# Patient Record
Sex: MALE | Race: WHITE | NOT HISPANIC OR LATINO | Employment: OTHER | URBAN - METROPOLITAN AREA
[De-identification: names, ages, dates, MRNs, and addresses within clinical notes are randomized per-mention and may not be internally consistent; named-entity substitution may affect disease eponyms.]

---

## 2017-03-02 ENCOUNTER — OFFICE VISIT - HEALTHEAST (OUTPATIENT)
Dept: INTERNAL MEDICINE | Facility: CLINIC | Age: 72
End: 2017-03-02

## 2017-03-02 ENCOUNTER — COMMUNICATION - HEALTHEAST (OUTPATIENT)
Dept: INTERNAL MEDICINE | Facility: CLINIC | Age: 72
End: 2017-03-02

## 2017-03-02 DIAGNOSIS — N40.1 BPH WITH URINARY OBSTRUCTION: ICD-10-CM

## 2017-03-02 DIAGNOSIS — N13.8 BPH WITH URINARY OBSTRUCTION: ICD-10-CM

## 2017-03-02 DIAGNOSIS — I25.10 CORONARY ATHEROSCLEROSIS: ICD-10-CM

## 2017-03-02 DIAGNOSIS — E78.2 HYPERLIPEMIA, MIXED: ICD-10-CM

## 2017-03-02 DIAGNOSIS — I21.9 ACUTE MYOCARDIAL INFARCTION (H): ICD-10-CM

## 2017-03-02 DIAGNOSIS — Z12.5 SCREENING FOR PROSTATE CANCER: ICD-10-CM

## 2017-03-02 LAB
CHOLEST SERPL-MCNC: 99 MG/DL
FASTING STATUS PATIENT QL REPORTED: YES
HDLC SERPL-MCNC: 35 MG/DL
LDLC SERPL CALC-MCNC: 44 MG/DL
PSA SERPL-MCNC: 1.7 NG/ML (ref 0–6.5)
TRIGL SERPL-MCNC: 101 MG/DL

## 2017-03-02 ASSESSMENT — MIFFLIN-ST. JEOR: SCORE: 1725.09

## 2017-03-09 ENCOUNTER — COMMUNICATION - HEALTHEAST (OUTPATIENT)
Dept: INTERNAL MEDICINE | Facility: CLINIC | Age: 72
End: 2017-03-09

## 2017-03-17 ENCOUNTER — COMMUNICATION - HEALTHEAST (OUTPATIENT)
Dept: INTERNAL MEDICINE | Facility: CLINIC | Age: 72
End: 2017-03-17

## 2017-11-09 ENCOUNTER — RECORDS - HEALTHEAST (OUTPATIENT)
Dept: ADMINISTRATIVE | Facility: OTHER | Age: 72
End: 2017-11-09

## 2018-03-22 ENCOUNTER — COMMUNICATION - HEALTHEAST (OUTPATIENT)
Dept: INTERNAL MEDICINE | Facility: CLINIC | Age: 73
End: 2018-03-22

## 2018-03-22 ENCOUNTER — OFFICE VISIT - HEALTHEAST (OUTPATIENT)
Dept: INTERNAL MEDICINE | Facility: CLINIC | Age: 73
End: 2018-03-22

## 2018-03-22 DIAGNOSIS — E78.2 HYPERLIPEMIA, MIXED: ICD-10-CM

## 2018-03-22 DIAGNOSIS — Z12.5 SCREENING FOR PROSTATE CANCER: ICD-10-CM

## 2018-03-22 DIAGNOSIS — H65.20: ICD-10-CM

## 2018-03-22 DIAGNOSIS — I21.4 ACUTE NON-ST ELEVATION MYOCARDIAL INFARCTION (NSTEMI) (H): ICD-10-CM

## 2018-03-22 DIAGNOSIS — Z00.00 ROUTINE GENERAL MEDICAL EXAMINATION AT A HEALTH CARE FACILITY: ICD-10-CM

## 2018-03-22 LAB
ALBUMIN SERPL-MCNC: 4.2 G/DL (ref 3.5–5)
ALP SERPL-CCNC: 67 U/L (ref 45–120)
ALT SERPL W P-5'-P-CCNC: 17 U/L (ref 0–45)
ANION GAP SERPL CALCULATED.3IONS-SCNC: 10 MMOL/L (ref 5–18)
AST SERPL W P-5'-P-CCNC: 16 U/L (ref 0–40)
BILIRUB SERPL-MCNC: 1.3 MG/DL (ref 0–1)
BUN SERPL-MCNC: 21 MG/DL (ref 8–28)
CALCIUM SERPL-MCNC: 9.1 MG/DL (ref 8.5–10.5)
CHLORIDE BLD-SCNC: 104 MMOL/L (ref 98–107)
CHOLEST SERPL-MCNC: 106 MG/DL
CO2 SERPL-SCNC: 26 MMOL/L (ref 22–31)
CREAT SERPL-MCNC: 0.92 MG/DL (ref 0.7–1.3)
ERYTHROCYTE [DISTWIDTH] IN BLOOD BY AUTOMATED COUNT: 11.9 % (ref 11–14.5)
FASTING STATUS PATIENT QL REPORTED: YES
GFR SERPL CREATININE-BSD FRML MDRD: >60 ML/MIN/1.73M2
GLUCOSE BLD-MCNC: 105 MG/DL (ref 70–125)
HCT VFR BLD AUTO: 46.8 % (ref 40–54)
HDLC SERPL-MCNC: 39 MG/DL
HGB BLD-MCNC: 15.5 G/DL (ref 14–18)
LDLC SERPL CALC-MCNC: 50 MG/DL
MCH RBC QN AUTO: 29.4 PG (ref 27–34)
MCHC RBC AUTO-ENTMCNC: 33.2 G/DL (ref 32–36)
MCV RBC AUTO: 89 FL (ref 80–100)
PLATELET # BLD AUTO: 192 THOU/UL (ref 140–440)
PMV BLD AUTO: 8.1 FL (ref 7–10)
POTASSIUM BLD-SCNC: 4.3 MMOL/L (ref 3.5–5)
PROT SERPL-MCNC: 6.7 G/DL (ref 6–8)
PSA SERPL-MCNC: 2 NG/ML (ref 0–6.5)
RBC # BLD AUTO: 5.28 MILL/UL (ref 4.4–6.2)
SODIUM SERPL-SCNC: 140 MMOL/L (ref 136–145)
TRIGL SERPL-MCNC: 87 MG/DL
WBC: 8 THOU/UL (ref 4–11)

## 2018-03-22 ASSESSMENT — MIFFLIN-ST. JEOR: SCORE: 1715.45

## 2018-06-06 ENCOUNTER — COMMUNICATION - HEALTHEAST (OUTPATIENT)
Dept: INTERNAL MEDICINE | Facility: CLINIC | Age: 73
End: 2018-06-06

## 2019-04-22 ENCOUNTER — OFFICE VISIT - HEALTHEAST (OUTPATIENT)
Dept: INTERNAL MEDICINE | Facility: CLINIC | Age: 74
End: 2019-04-22

## 2019-04-22 DIAGNOSIS — I25.10 ATHEROSCLEROSIS OF NATIVE CORONARY ARTERY OF NATIVE HEART WITHOUT ANGINA PECTORIS: ICD-10-CM

## 2019-04-22 DIAGNOSIS — Z12.5 SCREENING FOR PROSTATE CANCER: ICD-10-CM

## 2019-04-22 DIAGNOSIS — J30.1 NON-SEASONAL ALLERGIC RHINITIS DUE TO POLLEN: ICD-10-CM

## 2019-04-22 DIAGNOSIS — Z00.00 ROUTINE GENERAL MEDICAL EXAMINATION AT A HEALTH CARE FACILITY: ICD-10-CM

## 2019-04-22 DIAGNOSIS — E78.2 HYPERLIPEMIA, MIXED: ICD-10-CM

## 2019-04-22 LAB
ALBUMIN SERPL-MCNC: 4.1 G/DL (ref 3.5–5)
ALP SERPL-CCNC: 57 U/L (ref 45–120)
ALT SERPL W P-5'-P-CCNC: 13 U/L (ref 0–45)
ANION GAP SERPL CALCULATED.3IONS-SCNC: 5 MMOL/L (ref 5–18)
AST SERPL W P-5'-P-CCNC: 16 U/L (ref 0–40)
BILIRUB SERPL-MCNC: 0.9 MG/DL (ref 0–1)
BUN SERPL-MCNC: 18 MG/DL (ref 8–28)
CALCIUM SERPL-MCNC: 9.5 MG/DL (ref 8.5–10.5)
CHLORIDE BLD-SCNC: 107 MMOL/L (ref 98–107)
CHOLEST SERPL-MCNC: 98 MG/DL
CO2 SERPL-SCNC: 28 MMOL/L (ref 22–31)
CREAT SERPL-MCNC: 0.91 MG/DL (ref 0.7–1.3)
ERYTHROCYTE [DISTWIDTH] IN BLOOD BY AUTOMATED COUNT: 12.2 % (ref 11–14.5)
FASTING STATUS PATIENT QL REPORTED: YES
GFR SERPL CREATININE-BSD FRML MDRD: >60 ML/MIN/1.73M2
GLUCOSE BLD-MCNC: 109 MG/DL (ref 70–125)
HCT VFR BLD AUTO: 43.2 % (ref 40–54)
HDLC SERPL-MCNC: 36 MG/DL
HGB BLD-MCNC: 15.1 G/DL (ref 14–18)
LDLC SERPL CALC-MCNC: 43 MG/DL
MCH RBC QN AUTO: 30.1 PG (ref 27–34)
MCHC RBC AUTO-ENTMCNC: 34.8 G/DL (ref 32–36)
MCV RBC AUTO: 86 FL (ref 80–100)
PLATELET # BLD AUTO: 165 THOU/UL (ref 140–440)
PMV BLD AUTO: 7.7 FL (ref 7–10)
POTASSIUM BLD-SCNC: 5.1 MMOL/L (ref 3.5–5)
PROT SERPL-MCNC: 6.6 G/DL (ref 6–8)
PSA SERPL-MCNC: 1.9 NG/ML (ref 0–6.5)
RBC # BLD AUTO: 5.01 MILL/UL (ref 4.4–6.2)
SODIUM SERPL-SCNC: 140 MMOL/L (ref 136–145)
TRIGL SERPL-MCNC: 93 MG/DL
WBC: 6.2 THOU/UL (ref 4–11)

## 2019-04-22 ASSESSMENT — MIFFLIN-ST. JEOR: SCORE: 1715.45

## 2019-04-23 ENCOUNTER — COMMUNICATION - HEALTHEAST (OUTPATIENT)
Dept: INTERNAL MEDICINE | Facility: CLINIC | Age: 74
End: 2019-04-23

## 2019-05-02 ENCOUNTER — AMBULATORY - HEALTHEAST (OUTPATIENT)
Dept: INTERNAL MEDICINE | Facility: CLINIC | Age: 74
End: 2019-05-02

## 2019-05-02 DIAGNOSIS — I25.10 ATHEROSCLEROSIS OF NATIVE CORONARY ARTERY OF NATIVE HEART WITHOUT ANGINA PECTORIS: ICD-10-CM

## 2019-06-14 ENCOUNTER — COMMUNICATION - HEALTHEAST (OUTPATIENT)
Dept: INTERNAL MEDICINE | Facility: CLINIC | Age: 74
End: 2019-06-14

## 2019-06-14 DIAGNOSIS — E78.2 HYPERLIPEMIA, MIXED: ICD-10-CM

## 2019-06-14 DIAGNOSIS — I25.10 ATHEROSCLEROSIS OF NATIVE CORONARY ARTERY OF NATIVE HEART WITHOUT ANGINA PECTORIS: ICD-10-CM

## 2019-10-14 ENCOUNTER — RECORDS - HEALTHEAST (OUTPATIENT)
Dept: ADMINISTRATIVE | Facility: OTHER | Age: 74
End: 2019-10-14

## 2020-01-23 ENCOUNTER — OFFICE VISIT - HEALTHEAST (OUTPATIENT)
Dept: INTERNAL MEDICINE | Facility: CLINIC | Age: 75
End: 2020-01-23

## 2020-01-23 DIAGNOSIS — I25.10 ATHEROSCLEROSIS OF NATIVE CORONARY ARTERY OF NATIVE HEART WITHOUT ANGINA PECTORIS: ICD-10-CM

## 2020-01-23 DIAGNOSIS — J30.1 SEASONAL ALLERGIC RHINITIS DUE TO POLLEN: ICD-10-CM

## 2020-01-23 DIAGNOSIS — N40.1 BPH WITH URINARY OBSTRUCTION: ICD-10-CM

## 2020-01-23 DIAGNOSIS — R39.15 URINARY URGENCY: ICD-10-CM

## 2020-01-23 DIAGNOSIS — N13.8 BPH WITH URINARY OBSTRUCTION: ICD-10-CM

## 2020-01-23 DIAGNOSIS — E78.2 HYPERLIPEMIA, MIXED: ICD-10-CM

## 2020-01-23 DIAGNOSIS — L43.9 LICHEN PLANUS: ICD-10-CM

## 2020-01-23 LAB
ALBUMIN UR-MCNC: NEGATIVE MG/DL
ANION GAP SERPL CALCULATED.3IONS-SCNC: 9 MMOL/L (ref 5–18)
APPEARANCE UR: CLEAR
BACTERIA #/AREA URNS HPF: ABNORMAL HPF
BILIRUB UR QL STRIP: NEGATIVE
BUN SERPL-MCNC: 20 MG/DL (ref 8–28)
CALCIUM SERPL-MCNC: 9.6 MG/DL (ref 8.5–10.5)
CHLORIDE BLD-SCNC: 106 MMOL/L (ref 98–107)
CO2 SERPL-SCNC: 25 MMOL/L (ref 22–31)
COLOR UR AUTO: YELLOW
CREAT SERPL-MCNC: 0.85 MG/DL (ref 0.7–1.3)
GFR SERPL CREATININE-BSD FRML MDRD: >60 ML/MIN/1.73M2
GLUCOSE BLD-MCNC: 106 MG/DL (ref 70–125)
GLUCOSE UR STRIP-MCNC: NEGATIVE MG/DL
HGB UR QL STRIP: ABNORMAL
KETONES UR STRIP-MCNC: NEGATIVE MG/DL
LEUKOCYTE ESTERASE UR QL STRIP: NEGATIVE
NITRATE UR QL: NEGATIVE
PH UR STRIP: 5.5 [PH] (ref 5–8)
POTASSIUM BLD-SCNC: 4.6 MMOL/L (ref 3.5–5)
PSA SERPL-MCNC: 1.6 NG/ML (ref 0–6.5)
RBC #/AREA URNS AUTO: ABNORMAL HPF
SODIUM SERPL-SCNC: 140 MMOL/L (ref 136–145)
SP GR UR STRIP: >=1.03 (ref 1–1.03)
SQUAMOUS #/AREA URNS AUTO: ABNORMAL LPF
UROBILINOGEN UR STRIP-ACNC: ABNORMAL
WBC #/AREA URNS AUTO: ABNORMAL HPF

## 2020-01-23 ASSESSMENT — MIFFLIN-ST. JEOR: SCORE: 1706.38

## 2020-03-18 ENCOUNTER — RECORDS - HEALTHEAST (OUTPATIENT)
Dept: ADMINISTRATIVE | Facility: OTHER | Age: 75
End: 2020-03-18

## 2020-04-17 ENCOUNTER — COMMUNICATION - HEALTHEAST (OUTPATIENT)
Dept: INTERNAL MEDICINE | Facility: CLINIC | Age: 75
End: 2020-04-17

## 2020-04-22 ENCOUNTER — OFFICE VISIT - HEALTHEAST (OUTPATIENT)
Dept: INTERNAL MEDICINE | Facility: CLINIC | Age: 75
End: 2020-04-22

## 2020-04-22 DIAGNOSIS — M79.674 PAIN OF TOE OF RIGHT FOOT: ICD-10-CM

## 2020-04-22 DIAGNOSIS — I25.10 ATHEROSCLEROSIS OF NATIVE CORONARY ARTERY OF NATIVE HEART WITHOUT ANGINA PECTORIS: ICD-10-CM

## 2020-04-22 DIAGNOSIS — N13.8 BPH WITH URINARY OBSTRUCTION: ICD-10-CM

## 2020-04-22 DIAGNOSIS — N40.1 BPH WITH URINARY OBSTRUCTION: ICD-10-CM

## 2020-04-22 DIAGNOSIS — M15.0 PRIMARY OSTEOARTHRITIS INVOLVING MULTIPLE JOINTS: ICD-10-CM

## 2020-04-22 RX ORDER — KETOCONAZOLE 20 MG/G
CREAM TOPICAL
Status: SHIPPED | COMMUNITY
Start: 2020-03-18 | End: 2022-02-16

## 2020-04-22 ASSESSMENT — PATIENT HEALTH QUESTIONNAIRE - PHQ9: SUM OF ALL RESPONSES TO PHQ QUESTIONS 1-9: 0

## 2020-05-06 ENCOUNTER — COMMUNICATION - HEALTHEAST (OUTPATIENT)
Dept: PODIATRY | Facility: CLINIC | Age: 75
End: 2020-05-06

## 2020-05-06 ENCOUNTER — COMMUNICATION - HEALTHEAST (OUTPATIENT)
Dept: INTERNAL MEDICINE | Facility: CLINIC | Age: 75
End: 2020-05-06

## 2020-05-06 DIAGNOSIS — M79.10 MYALGIA: ICD-10-CM

## 2020-05-06 DIAGNOSIS — E78.2 HYPERLIPEMIA, MIXED: ICD-10-CM

## 2020-05-07 ENCOUNTER — AMBULATORY - HEALTHEAST (OUTPATIENT)
Dept: INTERNAL MEDICINE | Facility: CLINIC | Age: 75
End: 2020-05-07

## 2020-05-07 ENCOUNTER — OFFICE VISIT - HEALTHEAST (OUTPATIENT)
Dept: PODIATRY | Facility: CLINIC | Age: 75
End: 2020-05-07

## 2020-05-07 DIAGNOSIS — M79.672 PAIN IN BOTH FEET: ICD-10-CM

## 2020-05-07 DIAGNOSIS — M79.10 MYALGIA: ICD-10-CM

## 2020-05-07 DIAGNOSIS — M79.671 PAIN IN BOTH FEET: ICD-10-CM

## 2020-05-08 ENCOUNTER — AMBULATORY - HEALTHEAST (OUTPATIENT)
Dept: LAB | Facility: CLINIC | Age: 75
End: 2020-05-08

## 2020-05-08 DIAGNOSIS — E78.2 HYPERLIPEMIA, MIXED: ICD-10-CM

## 2020-05-08 DIAGNOSIS — M79.10 MYALGIA: ICD-10-CM

## 2020-05-08 LAB
C REACTIVE PROTEIN LHE: 0.5 MG/DL (ref 0–0.8)
CK SERPL-CCNC: 35 U/L (ref 30–190)
ERYTHROCYTE [DISTWIDTH] IN BLOOD BY AUTOMATED COUNT: 12.3 % (ref 11–14.5)
ERYTHROCYTE [SEDIMENTATION RATE] IN BLOOD BY WESTERGREN METHOD: 12 MM/HR (ref 0–15)
HCT VFR BLD AUTO: 41.7 % (ref 40–54)
HGB BLD-MCNC: 13.9 G/DL (ref 14–18)
MCH RBC QN AUTO: 28.7 PG (ref 27–34)
MCHC RBC AUTO-ENTMCNC: 33.3 G/DL (ref 32–36)
MCV RBC AUTO: 86 FL (ref 80–100)
PLATELET # BLD AUTO: 235 THOU/UL (ref 140–440)
PMV BLD AUTO: 7.8 FL (ref 7–10)
RBC # BLD AUTO: 4.84 MILL/UL (ref 4.4–6.2)
TSH SERPL DL<=0.005 MIU/L-ACNC: 1.14 UIU/ML (ref 0.3–5)
WBC: 7.8 THOU/UL (ref 4–11)

## 2020-05-11 ENCOUNTER — COMMUNICATION - HEALTHEAST (OUTPATIENT)
Dept: INTERNAL MEDICINE | Facility: CLINIC | Age: 75
End: 2020-05-11

## 2020-05-12 LAB — B BURGDOR IGG+IGM SER QL: 0.02 INDEX VALUE

## 2020-05-14 ENCOUNTER — COMMUNICATION - HEALTHEAST (OUTPATIENT)
Dept: INTERNAL MEDICINE | Facility: CLINIC | Age: 75
End: 2020-05-14

## 2020-05-14 ENCOUNTER — OFFICE VISIT - HEALTHEAST (OUTPATIENT)
Dept: INTERNAL MEDICINE | Facility: CLINIC | Age: 75
End: 2020-05-14

## 2020-05-14 DIAGNOSIS — M25.50 MULTIPLE JOINT PAIN: ICD-10-CM

## 2020-05-15 ENCOUNTER — COMMUNICATION - HEALTHEAST (OUTPATIENT)
Dept: INTERNAL MEDICINE | Facility: CLINIC | Age: 75
End: 2020-05-15

## 2020-05-15 DIAGNOSIS — M25.50 MULTIPLE JOINT PAIN: ICD-10-CM

## 2020-05-19 ENCOUNTER — COMMUNICATION - HEALTHEAST (OUTPATIENT)
Dept: INTERNAL MEDICINE | Facility: CLINIC | Age: 75
End: 2020-05-19

## 2020-05-20 ENCOUNTER — COMMUNICATION - HEALTHEAST (OUTPATIENT)
Dept: VASCULAR SURGERY | Facility: CLINIC | Age: 75
End: 2020-05-20

## 2020-05-21 ENCOUNTER — OFFICE VISIT - HEALTHEAST (OUTPATIENT)
Dept: INTERNAL MEDICINE | Facility: CLINIC | Age: 75
End: 2020-05-21

## 2020-05-21 ENCOUNTER — OFFICE VISIT - HEALTHEAST (OUTPATIENT)
Dept: PODIATRY | Facility: CLINIC | Age: 75
End: 2020-05-21

## 2020-05-21 DIAGNOSIS — M25.50 MULTIPLE JOINT PAIN: ICD-10-CM

## 2020-05-21 DIAGNOSIS — M21.6X2 PRONATION DEFORMITY OF BOTH FEET: ICD-10-CM

## 2020-05-21 DIAGNOSIS — M21.6X1 PRONATION DEFORMITY OF BOTH FEET: ICD-10-CM

## 2020-05-21 DIAGNOSIS — M77.41 METATARSALGIA OF RIGHT FOOT: ICD-10-CM

## 2020-05-21 DIAGNOSIS — M20.5X1 HALLUX LIMITUS OF RIGHT FOOT: ICD-10-CM

## 2020-06-02 ENCOUNTER — COMMUNICATION - HEALTHEAST (OUTPATIENT)
Dept: OTHER | Facility: CLINIC | Age: 75
End: 2020-06-02

## 2020-06-04 ENCOUNTER — OFFICE VISIT - HEALTHEAST (OUTPATIENT)
Dept: INTERNAL MEDICINE | Facility: CLINIC | Age: 75
End: 2020-06-04

## 2020-06-04 DIAGNOSIS — M25.531 PAIN IN BOTH WRISTS: ICD-10-CM

## 2020-06-04 DIAGNOSIS — I25.10 ATHEROSCLEROSIS OF NATIVE CORONARY ARTERY OF NATIVE HEART WITHOUT ANGINA PECTORIS: ICD-10-CM

## 2020-06-04 DIAGNOSIS — M25.532 PAIN IN BOTH WRISTS: ICD-10-CM

## 2020-06-04 DIAGNOSIS — M25.50 MULTIPLE JOINT PAIN: ICD-10-CM

## 2020-06-05 ENCOUNTER — RECORDS - HEALTHEAST (OUTPATIENT)
Dept: GENERAL RADIOLOGY | Facility: CLINIC | Age: 75
End: 2020-06-05

## 2020-06-05 DIAGNOSIS — M25.532 PAIN IN LEFT WRIST: ICD-10-CM

## 2020-06-05 DIAGNOSIS — M25.531 PAIN IN RIGHT WRIST: ICD-10-CM

## 2020-06-06 ENCOUNTER — COMMUNICATION - HEALTHEAST (OUTPATIENT)
Dept: INTERNAL MEDICINE | Facility: CLINIC | Age: 75
End: 2020-06-06

## 2020-06-18 ENCOUNTER — RECORDS - HEALTHEAST (OUTPATIENT)
Dept: ADMINISTRATIVE | Facility: OTHER | Age: 75
End: 2020-06-18

## 2020-06-30 ENCOUNTER — COMMUNICATION - HEALTHEAST (OUTPATIENT)
Dept: INTERNAL MEDICINE | Facility: CLINIC | Age: 75
End: 2020-06-30

## 2020-07-02 ENCOUNTER — OFFICE VISIT - HEALTHEAST (OUTPATIENT)
Dept: INTERNAL MEDICINE | Facility: CLINIC | Age: 75
End: 2020-07-02

## 2020-07-02 DIAGNOSIS — M25.60 MORNING STIFFNESS OF JOINTS: ICD-10-CM

## 2020-07-02 DIAGNOSIS — M25.532 LEFT WRIST PAIN: ICD-10-CM

## 2020-07-02 DIAGNOSIS — Z78.9 STATIN INTOLERANCE: ICD-10-CM

## 2020-07-02 DIAGNOSIS — M79.10 MYALGIA: ICD-10-CM

## 2020-07-03 ENCOUNTER — AMBULATORY - HEALTHEAST (OUTPATIENT)
Dept: LAB | Facility: CLINIC | Age: 75
End: 2020-07-03

## 2020-07-03 DIAGNOSIS — M25.532 LEFT WRIST PAIN: ICD-10-CM

## 2020-07-03 LAB
RHEUMATOID FACT SERPL-ACNC: <15 IU/ML (ref 0–30)
URATE SERPL-MCNC: 6.1 MG/DL (ref 3–8)

## 2020-07-05 LAB — ANA SER QL: 0.1 U

## 2020-07-07 LAB — CCP AB SER IA-ACNC: 0.9 U/ML

## 2020-07-24 ENCOUNTER — OFFICE VISIT - HEALTHEAST (OUTPATIENT)
Dept: INTERNAL MEDICINE | Facility: CLINIC | Age: 75
End: 2020-07-24

## 2020-07-24 DIAGNOSIS — Z71.89 ADVANCED CARE PLANNING/COUNSELING DISCUSSION: ICD-10-CM

## 2020-07-24 DIAGNOSIS — M79.10 MYALGIA: ICD-10-CM

## 2020-07-24 DIAGNOSIS — M25.532 LEFT WRIST PAIN: ICD-10-CM

## 2020-07-24 DIAGNOSIS — Z20.822 SUSPECTED COVID-19 VIRUS INFECTION: ICD-10-CM

## 2020-07-24 DIAGNOSIS — Z00.00 ROUTINE GENERAL MEDICAL EXAMINATION AT A HEALTH CARE FACILITY: ICD-10-CM

## 2020-07-24 DIAGNOSIS — I25.10 ATHEROSCLEROSIS OF NATIVE CORONARY ARTERY OF NATIVE HEART WITHOUT ANGINA PECTORIS: ICD-10-CM

## 2020-07-24 DIAGNOSIS — E78.2 HYPERLIPEMIA, MIXED: ICD-10-CM

## 2020-07-24 LAB
ALBUMIN SERPL-MCNC: 4.1 G/DL (ref 3.5–5)
ALP SERPL-CCNC: 60 U/L (ref 45–120)
ALT SERPL W P-5'-P-CCNC: 12 U/L (ref 0–45)
ANION GAP SERPL CALCULATED.3IONS-SCNC: 9 MMOL/L (ref 5–18)
AST SERPL W P-5'-P-CCNC: 14 U/L (ref 0–40)
BILIRUB SERPL-MCNC: 0.9 MG/DL (ref 0–1)
BUN SERPL-MCNC: 19 MG/DL (ref 8–28)
CALCIUM SERPL-MCNC: 9.2 MG/DL (ref 8.5–10.5)
CHLORIDE BLD-SCNC: 104 MMOL/L (ref 98–107)
CHOLEST SERPL-MCNC: 182 MG/DL
CO2 SERPL-SCNC: 27 MMOL/L (ref 22–31)
CREAT SERPL-MCNC: 0.96 MG/DL (ref 0.7–1.3)
ERYTHROCYTE [DISTWIDTH] IN BLOOD BY AUTOMATED COUNT: 13.5 % (ref 11–14.5)
FASTING STATUS PATIENT QL REPORTED: YES
GFR SERPL CREATININE-BSD FRML MDRD: >60 ML/MIN/1.73M2
GLUCOSE BLD-MCNC: 90 MG/DL (ref 70–125)
HCT VFR BLD AUTO: 45.1 % (ref 40–54)
HDLC SERPL-MCNC: 41 MG/DL
HGB BLD-MCNC: 15.3 G/DL (ref 14–18)
LDLC SERPL CALC-MCNC: 112 MG/DL
MCH RBC QN AUTO: 29.7 PG (ref 27–34)
MCHC RBC AUTO-ENTMCNC: 33.9 G/DL (ref 32–36)
MCV RBC AUTO: 88 FL (ref 80–100)
PLATELET # BLD AUTO: 188 THOU/UL (ref 140–440)
PMV BLD AUTO: 7.4 FL (ref 7–10)
POTASSIUM BLD-SCNC: 4.6 MMOL/L (ref 3.5–5)
PROT SERPL-MCNC: 6.4 G/DL (ref 6–8)
RBC # BLD AUTO: 5.15 MILL/UL (ref 4.4–6.2)
SODIUM SERPL-SCNC: 140 MMOL/L (ref 136–145)
TRIGL SERPL-MCNC: 146 MG/DL
WBC: 7.4 THOU/UL (ref 4–11)

## 2020-07-24 RX ORDER — ATENOLOL 25 MG/1
25 TABLET ORAL DAILY
Qty: 90 TABLET | Refills: 3 | Status: SHIPPED | OUTPATIENT
Start: 2020-07-24 | End: 2021-07-29

## 2020-07-24 ASSESSMENT — ANXIETY QUESTIONNAIRES
2. NOT BEING ABLE TO STOP OR CONTROL WORRYING: NOT AT ALL
1. FEELING NERVOUS, ANXIOUS, OR ON EDGE: NOT AT ALL

## 2020-07-24 ASSESSMENT — PATIENT HEALTH QUESTIONNAIRE - PHQ9: SUM OF ALL RESPONSES TO PHQ QUESTIONS 1-9: 0

## 2020-07-24 ASSESSMENT — MIFFLIN-ST. JEOR: SCORE: 1697.31

## 2020-07-29 ENCOUNTER — COMMUNICATION - HEALTHEAST (OUTPATIENT)
Dept: RHEUMATOLOGY | Facility: CLINIC | Age: 75
End: 2020-07-29

## 2020-07-31 ENCOUNTER — COMMUNICATION - HEALTHEAST (OUTPATIENT)
Dept: SCHEDULING | Facility: CLINIC | Age: 75
End: 2020-07-31

## 2020-09-21 ENCOUNTER — OFFICE VISIT - HEALTHEAST (OUTPATIENT)
Dept: CARDIOLOGY | Facility: CLINIC | Age: 75
End: 2020-09-21

## 2020-09-21 DIAGNOSIS — E78.5 HYPERLIPIDEMIA LDL GOAL <70: ICD-10-CM

## 2020-09-21 DIAGNOSIS — I25.10 CORONARY ARTERY DISEASE INVOLVING NATIVE CORONARY ARTERY OF NATIVE HEART WITHOUT ANGINA PECTORIS: ICD-10-CM

## 2020-09-21 DIAGNOSIS — Z78.9 STATIN INTOLERANCE: ICD-10-CM

## 2020-09-21 ASSESSMENT — MIFFLIN-ST. JEOR: SCORE: 1706.38

## 2020-10-07 ENCOUNTER — COMMUNICATION - HEALTHEAST (OUTPATIENT)
Dept: CARDIOLOGY | Facility: CLINIC | Age: 75
End: 2020-10-07

## 2020-10-07 DIAGNOSIS — E78.5 HYPERLIPIDEMIA: ICD-10-CM

## 2020-10-26 ENCOUNTER — COMMUNICATION - HEALTHEAST (OUTPATIENT)
Dept: CARDIOLOGY | Facility: CLINIC | Age: 75
End: 2020-10-26

## 2020-10-26 DIAGNOSIS — E78.5 HYPERLIPIDEMIA: ICD-10-CM

## 2020-11-10 ENCOUNTER — COMMUNICATION - HEALTHEAST (OUTPATIENT)
Dept: CARDIOLOGY | Facility: CLINIC | Age: 75
End: 2020-11-10

## 2020-11-12 ENCOUNTER — OFFICE VISIT - HEALTHEAST (OUTPATIENT)
Dept: INTERNAL MEDICINE | Facility: CLINIC | Age: 75
End: 2020-11-12

## 2020-11-12 DIAGNOSIS — E78.2 HYPERLIPEMIA, MIXED: ICD-10-CM

## 2020-11-12 DIAGNOSIS — M25.511 CHRONIC RIGHT SHOULDER PAIN: ICD-10-CM

## 2020-11-12 DIAGNOSIS — G89.29 CHRONIC RIGHT SHOULDER PAIN: ICD-10-CM

## 2020-11-12 DIAGNOSIS — I25.10 ATHEROSCLEROSIS OF NATIVE CORONARY ARTERY OF NATIVE HEART WITHOUT ANGINA PECTORIS: ICD-10-CM

## 2020-11-12 ASSESSMENT — MIFFLIN-ST. JEOR: SCORE: 1715.45

## 2020-11-18 ENCOUNTER — RECORDS - HEALTHEAST (OUTPATIENT)
Dept: ADMINISTRATIVE | Facility: OTHER | Age: 75
End: 2020-11-18

## 2021-01-06 ENCOUNTER — RECORDS - HEALTHEAST (OUTPATIENT)
Dept: ADMINISTRATIVE | Facility: OTHER | Age: 76
End: 2021-01-06

## 2021-01-26 ENCOUNTER — AMBULATORY - HEALTHEAST (OUTPATIENT)
Dept: NURSING | Facility: CLINIC | Age: 76
End: 2021-01-26

## 2021-02-12 ENCOUNTER — OFFICE VISIT - HEALTHEAST (OUTPATIENT)
Dept: INTERNAL MEDICINE | Facility: CLINIC | Age: 76
End: 2021-02-12

## 2021-02-12 DIAGNOSIS — I25.10 ATHEROSCLEROSIS OF NATIVE CORONARY ARTERY OF NATIVE HEART WITHOUT ANGINA PECTORIS: ICD-10-CM

## 2021-02-12 DIAGNOSIS — M25.511 CHRONIC RIGHT SHOULDER PAIN: ICD-10-CM

## 2021-02-12 DIAGNOSIS — G89.29 CHRONIC RIGHT SHOULDER PAIN: ICD-10-CM

## 2021-02-12 DIAGNOSIS — E78.2 HYPERLIPEMIA, MIXED: ICD-10-CM

## 2021-02-12 RX ORDER — ROSUVASTATIN CALCIUM 10 MG/1
10 TABLET, COATED ORAL DAILY
Status: SHIPPED
Start: 2021-02-12 | End: 2022-02-16

## 2021-02-12 ASSESSMENT — MIFFLIN-ST. JEOR: SCORE: 1701.84

## 2021-02-16 ENCOUNTER — AMBULATORY - HEALTHEAST (OUTPATIENT)
Dept: NURSING | Facility: CLINIC | Age: 76
End: 2021-02-16

## 2021-02-17 ENCOUNTER — RECORDS - HEALTHEAST (OUTPATIENT)
Dept: ADMINISTRATIVE | Facility: OTHER | Age: 76
End: 2021-02-17

## 2021-02-18 ENCOUNTER — COMMUNICATION - HEALTHEAST (OUTPATIENT)
Dept: INTERNAL MEDICINE | Facility: CLINIC | Age: 76
End: 2021-02-18

## 2021-03-25 ENCOUNTER — RECORDS - HEALTHEAST (OUTPATIENT)
Dept: ADMINISTRATIVE | Facility: OTHER | Age: 76
End: 2021-03-25

## 2021-04-16 ENCOUNTER — COMMUNICATION - HEALTHEAST (OUTPATIENT)
Dept: CARDIOLOGY | Facility: CLINIC | Age: 76
End: 2021-04-16

## 2021-04-20 ENCOUNTER — COMMUNICATION - HEALTHEAST (OUTPATIENT)
Dept: CARDIOLOGY | Facility: CLINIC | Age: 76
End: 2021-04-20

## 2021-05-01 ENCOUNTER — HEALTH MAINTENANCE LETTER (OUTPATIENT)
Age: 76
End: 2021-05-01

## 2021-05-20 ENCOUNTER — COMMUNICATION - HEALTHEAST (OUTPATIENT)
Dept: CARDIOLOGY | Facility: CLINIC | Age: 76
End: 2021-05-20

## 2021-05-26 ENCOUNTER — RECORDS - HEALTHEAST (OUTPATIENT)
Dept: ADMINISTRATIVE | Facility: CLINIC | Age: 76
End: 2021-05-26

## 2021-05-26 ASSESSMENT — PATIENT HEALTH QUESTIONNAIRE - PHQ9: SUM OF ALL RESPONSES TO PHQ QUESTIONS 1-9: 0

## 2021-05-27 ENCOUNTER — RECORDS - HEALTHEAST (OUTPATIENT)
Dept: ADMINISTRATIVE | Facility: CLINIC | Age: 76
End: 2021-05-27

## 2021-05-27 VITALS — SYSTOLIC BLOOD PRESSURE: 128 MMHG | DIASTOLIC BLOOD PRESSURE: 78 MMHG | TEMPERATURE: 97.9 F | HEART RATE: 72 BPM

## 2021-05-27 ASSESSMENT — PATIENT HEALTH QUESTIONNAIRE - PHQ9: SUM OF ALL RESPONSES TO PHQ QUESTIONS 1-9: 0

## 2021-05-28 NOTE — PROGRESS NOTES
"Office Visit - Physical    Asael CALDWELL Theron   74 y.o. male   Office Visit - Follow up    Asael CALDWELL Theron   74 y.o. male    Date of Visit: 4/22/2019    Chief Complaint   Patient presents with     Annual Wellness Visit     Pt is fasting       Subjective: Very pleasant 74-year-old gentleman here for annual wellness visit and regular physical as well as for follow-up of hyperlipidemia history of coronary disease and BPH    History of coronary disease as noted when presented with acute coronary syndrome in 2002 had PTCA and stent placement on single-vessel and has done well since that time.  Current medications reviewed and reconciled    Discussed health risk assessment advanced directives cognitive assessment and mood assessment all stable    No specific new concerns    Current medications unchanged            ROS: A comprehensive review of systems was performed and was otherwise negative except as mentioned above.     Exam  Normal cognitive function normal mental status skin and lymphatics negative EENT unremarkable lungs clear heart normal abdomen benign extremities negative peripheral pulses normal rectal not performed genitalia negative remainder of comprehensive exam on room   /70 (Patient Site: Right Arm, Patient Position: Sitting)   Pulse (!) 55   Ht 6' 2.25\" (1.886 m)   Wt 201 lb (91.2 kg)   BMI 25.63 kg/m      Assessment and Plan  Stable physical exam and wellness visit    Labs pending    Asael was seen today for annual wellness visit.    Diagnoses and all orders for this visit:    Atherosclerosis of native coronary artery of native heart without angina pectoris  -     atenolol (TENORMIN) 25 MG tablet; Take 1 tablet (25 mg total) by mouth daily.  -     HM2(CBC w/o Differential); Future  -     Comprehensive Metabolic Panel; Future  -     HM2(CBC w/o Differential)  -     Comprehensive Metabolic Panel    Non-seasonal allergic rhinitis due to pollen  -     HM2(CBC w/o Differential); Future  -     " Comprehensive Metabolic Panel; Future  -     HM2(CBC w/o Differential)  -     Comprehensive Metabolic Panel    Routine general medical examination at a health care facility  -     HM2(CBC w/o Differential); Future  -     Comprehensive Metabolic Panel; Future  -     HM2(CBC w/o Differential)  -     Comprehensive Metabolic Panel    Screening for prostate cancer  -     HM2(CBC w/o Differential); Future  -     Comprehensive Metabolic Panel; Future  -     PSA (Prostatic-Specific Antigen), Annual Screen  -     HM2(CBC w/o Differential)  -     Comprehensive Metabolic Panel    Hyperlipemia, mixed  -     HM2(CBC w/o Differential); Future  -     Comprehensive Metabolic Panel; Future  -     Lipid Cascade; Future  -     HM2(CBC w/o Differential)  -     Comprehensive Metabolic Panel  -     Lipid Cascade          Time: total time spent with the patient was 35 utes of which >50% was spent in counseling and coordination of care        Allergies   Allergen Reactions     Other Environmental Allergy      Tree pollen, dust, mold -- sneezing, itchy eyes         Medications :  Prior to Admission medications    Medication Sig Start Date End Date Taking? Authorizing Provider   aspirin 81 MG EC tablet Take 81 mg by mouth daily.   Yes PROVIDER, HISTORICAL   atenolol (TENORMIN) 25 MG tablet Take 1 tablet (25 mg total) by mouth daily. 4/22/19  Yes Smith Cota MD   cholecalciferol, vitamin D3, (VITAMIN D3) 2,000 unit cap Take by mouth daily.   Yes PROVIDER, HISTORICAL   simvastatin (ZOCOR) 20 MG tablet Take 1 tablet (20 mg total) by mouth at bedtime. 6/7/18  Yes Smith Cota MD   atenolol (TENORMIN) 50 MG tablet Take 25 mg by mouth daily.  4/22/19 Yes PROVIDER, HISTORICAL   tamsulosin (FLOMAX) 0.4 mg Cp24 Take 1 capsule (0.4 mg total) by mouth daily after supper. 3/2/17 4/22/19 Yes Smith Cota MD        Past Medical History: No past medical history on file.    Past Surgical History:   Past Surgical History:   Procedure Laterality  Date     CORONARY STENT PLACEMENT      10-12 years ago right     [place] HALLUX RIGIDUS W/CHEILECTOMY 1ST MP JT W/O IMPLT Bilateral 5/15/2015    Procedure: CHEILECTOMY BOTH FEET;  Surgeon: Ramon Corey DPM;  Location: [facility];  Service: Podiatry       Social History:   Social History     Socioeconomic History     Marital status:      Spouse name: Not on file     Number of children: Not on file     Years of education: Not on file     Highest education level: Not on file   Occupational History     Not on file   Social Needs     Financial resource strain: Not on file     Food insecurity:     Worry: Not on file     Inability: Not on file     Transportation needs:     Medical: Not on file     Non-medical: Not on file   Tobacco Use     Smoking status: Former Smoker     Last attempt to quit: 2000     Years since quittin.3     Smokeless tobacco: Never Used   Substance and Sexual Activity     Alcohol use: Yes     Comment: occasional     Drug use: No     Sexual activity: Not on file   Lifestyle     Physical activity:     Days per week: Not on file     Minutes per session: Not on file     Stress: Not on file   Relationships     Social connections:     Talks on phone: Not on file     Gets together: Not on file     Attends Church service: Not on file     Active member of club or organization: Not on file     Attends meetings of clubs or organizations: Not on file     Relationship status: Not on file     Intimate partner violence:     Fear of current or ex partner: Not on file     Emotionally abused: Not on file     Physically abused: Not on file     Forced sexual activity: Not on file   Other Topics Concern     Not on file   Social History Narrative     Not on file       Family History: No family history on file.      Smith Cota MD      Date of Visit: 2019    Chief Complaint   Patient presents with     Annual Wellness Visit     Pt is fasting       Subjective:     ROS: A comprehensive review  "of systems was performed and was otherwise negative except as mentioned above.    Exam   /70 (Patient Site: Right Arm, Patient Position: Sitting)   Pulse (!) 55   Ht 6' 2.25\" (1.886 m)   Wt 201 lb (91.2 kg)   BMI 25.63 kg/m      Assessment and Plan        There are no diagnoses linked to this encounter.          Medications:   Prior to Admission medications    Medication Sig Start Date End Date Taking? Authorizing Provider   aspirin 81 MG EC tablet Take 81 mg by mouth daily.   Yes PROVIDER, HISTORICAL   atenolol (TENORMIN) 50 MG tablet Take 25 mg by mouth daily.   Yes PROVIDER, HISTORICAL   cholecalciferol, vitamin D3, (VITAMIN D3) 2,000 unit cap Take by mouth daily.   Yes PROVIDER, HISTORICAL   simvastatin (ZOCOR) 20 MG tablet Take 1 tablet (20 mg total) by mouth at bedtime. 6/7/18  Yes Smith Cota MD   tamsulosin (FLOMAX) 0.4 mg Cp24 Take 1 capsule (0.4 mg total) by mouth daily after supper. 3/2/17 4/22/19 Yes Smith Cota MD       Allergies:  Allergies   Allergen Reactions     Other Environmental Allergy      Tree pollen, dust, mold -- sneezing, itchy eyes         Immunizations:   Immunization History   Administered Date(s) Administered     DT (pediatric) 04/01/1999     Influenza high dose, seasonal 10/15/2016     Influenza, inj, historic,unspecified 09/20/2010     Influenza, seasonal,quad inj 6-35 mos 09/24/2009, 10/15/2012, 10/24/2013     Pneumo Conj 13-V (2010&after) 12/19/2014     Pneumo Polysac 23-V 05/03/2012     Td,adult,historic,unspecified 09/23/2009     Tdap 09/23/2009       Past Medical History: No past medical history on file.    Past Surgical History:   Past Surgical History:   Procedure Laterality Date     CORONARY STENT PLACEMENT      10-12 years ago right     [place] HALLUX RIGIDUS W/CHEILECTOMY 1ST MP JT W/O IMPLT Bilateral 5/15/2015    Procedure: CHEILECTOMY BOTH FEET;  Surgeon: Ramon Corey DPM;  Location: [facility];  Service: Podiatry       Family History: No family " history on file.    Social History:   Social History     Socioeconomic History     Marital status:      Spouse name: Not on file     Number of children: Not on file     Years of education: Not on file     Highest education level: Not on file   Occupational History     Not on file   Social Needs     Financial resource strain: Not on file     Food insecurity:     Worry: Not on file     Inability: Not on file     Transportation needs:     Medical: Not on file     Non-medical: Not on file   Tobacco Use     Smoking status: Former Smoker     Last attempt to quit: 2000     Years since quittin.3     Smokeless tobacco: Never Used   Substance and Sexual Activity     Alcohol use: Yes     Comment: occasional     Drug use: No     Sexual activity: Not on file   Lifestyle     Physical activity:     Days per week: Not on file     Minutes per session: Not on file     Stress: Not on file   Relationships     Social connections:     Talks on phone: Not on file     Gets together: Not on file     Attends Faith service: Not on file     Active member of club or organization: Not on file     Attends meetings of clubs or organizations: Not on file     Relationship status: Not on file     Intimate partner violence:     Fear of current or ex partner: Not on file     Emotionally abused: Not on file     Physically abused: Not on file     Forced sexual activity: Not on file   Other Topics Concern     Not on file   Social History Narrative     Not on file         Smith Coat MD      Assessment and Plan:       The patient's current medical problems were reviewed.    I have had an Advance Directives discussion with the patient.  The following health maintenance schedule was reviewed with the patient and provided in printed form in the after visit summary:   Health Maintenance   Topic Date Due     ZOSTER VACCINES (1 of 2) 1995     INFLUENZA VACCINE RULE BASED (1) 2018     FALL RISK ASSESSMENT  2019     TD  18+ HE  2019     ADVANCE DIRECTIVES DISCUSSED WITH PATIENT  2019     COLONOSCOPY  2027     PNEUMOCOCCAL POLYSACCHARIDE VACCINE AGE 65 AND OVER  Completed     PNEUMOCOCCAL CONJUGATE VACCINE FOR ADULTS (PCV13 OR PREVNAR)  Completed        Subjective:   Chief Complaint: Asael Crump is an 74 y.o. male here for an Annual Wellness visit.   HPI:      Review of Systems:    Please see above.  The rest of the review of systems are negative for all systems.    Patient Care Team:  Smith Cota MD as PCP - General     Patient Active Problem List   Diagnosis     Chronic Serous Otitis Media     Allergic Rhinitis     Lichen Planus     Pharyngitis     Hyperlipemia, mixed     Migraine Headache     Acute Myocardial Infarction     Coronary Artery Disease     BPH with urinary obstruction     Other acquired deformity of toe     Screening for prostate cancer     No past medical history on file.   Past Surgical History:   Procedure Laterality Date     CORONARY STENT PLACEMENT      10-12 years ago right     [place] HALLUX RIGIDUS W/CHEILECTOMY 1ST MP JT W/O IMPLT Bilateral 5/15/2015    Procedure: CHEILECTOMY BOTH FEET;  Surgeon: Ramon Corey DPM;  Location: [facility];  Service: Podiatry      No family history on file.   Social History     Socioeconomic History     Marital status:      Spouse name: Not on file     Number of children: Not on file     Years of education: Not on file     Highest education level: Not on file   Occupational History     Not on file   Social Needs     Financial resource strain: Not on file     Food insecurity:     Worry: Not on file     Inability: Not on file     Transportation needs:     Medical: Not on file     Non-medical: Not on file   Tobacco Use     Smoking status: Former Smoker     Last attempt to quit: 2000     Years since quittin.3     Smokeless tobacco: Never Used   Substance and Sexual Activity     Alcohol use: Yes     Comment: occasional     Drug use: No  "    Sexual activity: Not on file   Lifestyle     Physical activity:     Days per week: Not on file     Minutes per session: Not on file     Stress: Not on file   Relationships     Social connections:     Talks on phone: Not on file     Gets together: Not on file     Attends Episcopal service: Not on file     Active member of club or organization: Not on file     Attends meetings of clubs or organizations: Not on file     Relationship status: Not on file     Intimate partner violence:     Fear of current or ex partner: Not on file     Emotionally abused: Not on file     Physically abused: Not on file     Forced sexual activity: Not on file   Other Topics Concern     Not on file   Social History Narrative     Not on file      Current Outpatient Medications   Medication Sig Dispense Refill     aspirin 81 MG EC tablet Take 81 mg by mouth daily.       atenolol (TENORMIN) 50 MG tablet Take 25 mg by mouth daily.       cholecalciferol, vitamin D3, (VITAMIN D3) 2,000 unit cap Take by mouth daily.       simvastatin (ZOCOR) 20 MG tablet Take 1 tablet (20 mg total) by mouth at bedtime. 90 tablet 3     No current facility-administered medications for this visit.       Objective:   Vital Signs:   Visit Vitals  /70 (Patient Site: Right Arm, Patient Position: Sitting)   Pulse (!) 55   Ht 6' 2.25\" (1.886 m)   Wt 201 lb (91.2 kg)   BMI 25.63 kg/m         VisionScreening:  No exam data present     PHYSICAL EXAM      Assessment Results 3/22/2018   Activities of Daily Living No help needed   Instrumental Activities of Daily Living No help needed   Get Up and Go Score Less than 12 seconds   Mini Cog Total Score 5   Some recent data might be hidden     A Mini-Cog score of 0-2 suggests the possibility of dementia, score of 3-5 suggests no dementia    Identified Health Risks:     The patient was counseled and encouraged to consider modifying their diet and eating habits. He was provided with information on recommended healthy diet " options.  Information regarding advance directives (living gonz[place]), including where he can download the appropriate form, was provided to the patient via the AVS.

## 2021-05-29 NOTE — TELEPHONE ENCOUNTER
Refill Request  Did you contact pharmacy: No  Medication name:   Requested Prescriptions     Pending Prescriptions Disp Refills     atenolol (TENORMIN) 25 MG tablet 90 tablet 3     Sig: Take 1 tablet (25 mg total) by mouth daily.     simvastatin (ZOCOR) 20 MG tablet 90 tablet 3     Sig: Take 1 tablet (20 mg total) by mouth at bedtime.     Who prescribed the medication: Smith Cota MD   Pharmacy Name and Location: [facility]  Is patient out of medication: No.  10 days left  Patient notified refills processed in 72 hours:  no  Okay to leave a detailed message: yes

## 2021-05-29 NOTE — TELEPHONE ENCOUNTER
Refill Approved    Rx renewed per Medication Renewal Policy. Medication was last renewed on 5/2/19 (atenolol) 6/7/18 (simvastatin).    Galilea Lau, [facility] Triage/Med Refill 6/14/2019     Requested Prescriptions   Pending Prescriptions Disp Refills     atenolol (TENORMIN) 25 MG tablet 90 tablet 3     Sig: Take 1 tablet (25 mg total) by mouth daily.       Beta-Blockers Refill Protocol Passed - 6/14/2019 10:51 AM        Passed - PCP or prescribing provider visit in past 12 months or next 3 months     Last office visit with prescriber/PCP: Visit date not found OR same dept: Visit date not found OR same specialty: Visit date not found  Last physical: 4/22/2019 Last MTM visit: Visit date not found   Next visit within 3 mo: Visit date not found  Next physical within 3 mo: Visit date not found  Prescriber OR PCP: Smith Cota MD  Last diagnosis associated with med order: 1. Atherosclerosis of native coronary artery of native heart without angina pectoris  - atenolol (TENORMIN) 25 MG tablet; Take 1 tablet (25 mg total) by mouth daily.  Dispense: 90 tablet; Refill: 3    2. Hyperlipemia, mixed  - simvastatin (ZOCOR) 20 MG tablet; Take 1 tablet (20 mg total) by mouth at bedtime.  Dispense: 90 tablet; Refill: 3    If protocol passes may refill for 12 months if within 3 months of last provider visit (or a total of 15 months).             Passed - Blood pressure filed in past 12 months     BP Readings from Last 1 Encounters:   04/22/19 110/70             simvastatin (ZOCOR) 20 MG tablet 90 tablet 3     Sig: Take 1 tablet (20 mg total) by mouth at bedtime.       Statins Refill Protocol (Hmg CoA Reductase Inhibitors) Passed - 6/14/2019 10:51 AM        Passed - PCP or prescribing provider visit in past 12 months      Last office visit with prescriber/PCP: Visit date not found OR same dept: Visit date not found OR same specialty: Visit date not found  Last physical: 4/22/2019 Last MTM visit: Visit date not found   Next  visit within 3 mo: Visit date not found  Next physical within 3 mo: Visit date not found  Prescriber OR PCP: Smith Cota MD  Last diagnosis associated with med order: 1. Atherosclerosis of native coronary artery of native heart without angina pectoris  - atenolol (TENORMIN) 25 MG tablet; Take 1 tablet (25 mg total) by mouth daily.  Dispense: 90 tablet; Refill: 3    2. Hyperlipemia, mixed  - simvastatin (ZOCOR) 20 MG tablet; Take 1 tablet (20 mg total) by mouth at bedtime.  Dispense: 90 tablet; Refill: 3    If protocol passes may refill for 12 months if within 3 months of last provider visit (or a total of 15 months).

## 2021-05-30 VITALS — WEIGHT: 204 LBS | BODY MASS INDEX: 26.18 KG/M2 | HEIGHT: 74 IN

## 2021-06-01 VITALS — BODY MASS INDEX: 25.8 KG/M2 | HEIGHT: 74 IN | WEIGHT: 201 LBS

## 2021-06-02 VITALS — BODY MASS INDEX: 25.8 KG/M2 | WEIGHT: 201 LBS | HEIGHT: 74 IN

## 2021-06-03 ENCOUNTER — RECORDS - HEALTHEAST (OUTPATIENT)
Dept: ADMINISTRATIVE | Facility: CLINIC | Age: 76
End: 2021-06-03

## 2021-06-04 VITALS
HEIGHT: 74 IN | SYSTOLIC BLOOD PRESSURE: 118 MMHG | WEIGHT: 199 LBS | DIASTOLIC BLOOD PRESSURE: 82 MMHG | BODY MASS INDEX: 25.54 KG/M2 | OXYGEN SATURATION: 98 % | HEART RATE: 66 BPM

## 2021-06-04 VITALS
BODY MASS INDEX: 25.28 KG/M2 | OXYGEN SATURATION: 96 % | DIASTOLIC BLOOD PRESSURE: 60 MMHG | HEIGHT: 74 IN | SYSTOLIC BLOOD PRESSURE: 110 MMHG | HEART RATE: 57 BPM | WEIGHT: 197 LBS

## 2021-06-04 VITALS
BODY MASS INDEX: 25.35 KG/M2 | WEIGHT: 198.8 LBS | DIASTOLIC BLOOD PRESSURE: 60 MMHG | HEART RATE: 58 BPM | SYSTOLIC BLOOD PRESSURE: 102 MMHG | OXYGEN SATURATION: 97 %

## 2021-06-05 VITALS
WEIGHT: 201 LBS | OXYGEN SATURATION: 99 % | HEIGHT: 74 IN | SYSTOLIC BLOOD PRESSURE: 122 MMHG | BODY MASS INDEX: 25.8 KG/M2 | HEART RATE: 66 BPM | DIASTOLIC BLOOD PRESSURE: 68 MMHG

## 2021-06-05 VITALS
SYSTOLIC BLOOD PRESSURE: 110 MMHG | WEIGHT: 198 LBS | BODY MASS INDEX: 25.41 KG/M2 | DIASTOLIC BLOOD PRESSURE: 64 MMHG | OXYGEN SATURATION: 97 % | HEIGHT: 74 IN | HEART RATE: 59 BPM

## 2021-06-05 VITALS
BODY MASS INDEX: 25.54 KG/M2 | DIASTOLIC BLOOD PRESSURE: 76 MMHG | HEART RATE: 64 BPM | WEIGHT: 199 LBS | RESPIRATION RATE: 14 BRPM | SYSTOLIC BLOOD PRESSURE: 120 MMHG | HEIGHT: 74 IN

## 2021-06-05 NOTE — PROGRESS NOTES
Office Visit - New Patient   Asael Crump   74 y.o.  male    Date of visit: 1/23/2020  Physician: Calixto Mancilla MD     Assessment and Plan   1. CAD, MI 2002 with stent  Continue secondary prevention    2. Hyperlipemia, mixed  Continue statin    3. Seasonal allergic rhinitis due to pollen  Uses allergy medicine seasonally    4. Lichen Planus - oral  Essentially resolved    5. BPH with urinary obstruction  Long discussion today.  He will try Flomax.  Discussed use of finasteride if this is not effective but also discussed that that can take up to a year to take maximal benefit.  Also discussed urology referral.  Doubt prostatitis but will check a PSA which was recently normal.  - tamsulosin (FLOMAX) 0.4 mg cap; Take 1 capsule (0.4 mg total) by mouth daily after supper.  Dispense: 90 capsule; Refill: 3    6. Urinary urgency  - PSA (Prostatic-Specific Antigen), Diagnostic  - Basic Metabolic Panel  - Urinalysis-UC if Indicated      Return in about 4 weeks (around 2/20/2020) for recheck.     Chief Complaint   Chief Complaint   Patient presents with     [facility]     Prostate Check        Patient Profile   Social History     Social History Narrative    Lives with wife, Christen.  Retired , [employer].  Dabbles in [place].  Two step daughters (Leia and Soledad), granddaughter and great-granddaughter.        Past Medical History   Patient Active Problem List   Diagnosis     Allergic Rhinitis     Lichen Planus - oral     Hyperlipemia, mixed     CAD, MI 2002 with stent     BPH with urinary obstruction       Past Surgical History  He has a past surgical history that includes Coronary stent placement (2002) and pr hallux rigidus w/cheilectomy 1st mp jt w/o implt (Bilateral, 5/15/2015).     History of Present Illness   This 74 y.o. old man comes in to [facility].  His medical history is reviewed, electronic record was updated and is reflectance note.  Overall feeling well.  His main symptom  "is urinary related.  He gets up a couple times at night.  He does not feel like he can completely void in the morning.  He has urgency during the day.  He has been on Flomax in the past but had some sexual side effects with ejaculation.  He is no longer having sex.  These concerns are therefore less for him.  History of coronary heart disease, stable.    Review of Systems: A comprehensive review of systems was negative except as noted.     Medications and Allergies   Current Outpatient Medications   Medication Sig Dispense Refill     aspirin 81 MG EC tablet Take 81 mg by mouth daily.       atenolol (TENORMIN) 25 MG tablet Take 1 tablet (25 mg total) by mouth daily. 90 tablet 3     cholecalciferol, vitamin D3, (VITAMIN D3) 2,000 unit cap Take by mouth daily.       simvastatin (ZOCOR) 20 MG tablet Take 1 tablet (20 mg total) by mouth at bedtime. 90 tablet 3     tamsulosin (FLOMAX) 0.4 mg cap Take 1 capsule (0.4 mg total) by mouth daily after supper. 90 capsule 3     No current facility-administered medications for this visit.      Allergies   Allergen Reactions     Other Environmental Allergy      Tree pollen, dust, mold -- sneezing, itchy eyes          Family and Social History   Family History   Problem Relation Age of Onset     Heart attack Father 67     COPD Father      Dementia Mother          97     Brain cancer Sister         Social History     Tobacco Use     Smoking status: Former Smoker     Last attempt to quit: 2000     Years since quittin.1     Smokeless tobacco: Never Used   Substance Use Topics     Alcohol use: Yes     Comment: occasional     Drug use: No        Physical Exam   General Appearance:   No acute distress    /82 (Patient Site: Left Arm, Patient Position: Sitting, Cuff Size: Adult Regular)   Pulse 66   Ht 6' 2.25\" (1.886 m)   Wt 199 lb (90.3 kg)   SpO2 98%   BMI 25.38 kg/m      EYES: Eyelids, conjunctiva, and sclera were normal. Pupils were normal. Cornea, iris, and " lens were normal bilaterally.  HEAD, EARS, NOSE, MOUTH, AND THROAT: Head and face were normal. Hearing was normal to voice and the ears were normal to external exam. Nose appearance was normal and there was no discharge. Oropharynx was normal.  NECK: Neck appearance was normal. There were no neck masses and the thyroid was not enlarged.  RESPIRATORY: Breathing pattern was normal and the chest moved symmetrically.  Percussion/auscultatory percussion was normal.  Lung sounds were normal and there were no abnormal sounds.  CARDIOVASCULAR: Heart rate and rhythm were normal.  S1 and S2 were normal and there were no extra sounds or murmurs. Peripheral pulses in arms and legs were normal.  Jugular venous pressure was normal.  There was no peripheral edema.  GASTROINTESTINAL: The abdomen was normal in contour.  Bowel sounds were present.  Percussion detected no organ enlargement or tenderness.  Palpation detected no tenderness, mass, or enlarged organs.   MUSCULOSKELETAL: Skeletal configuration was normal and muscle mass was normal for age. Joint appearance was overall normal.  LYMPHATIC: There were no enlarged nodes.  SKIN/HAIR/NAILS: Skin color was normal.  There were no skin lesions.  Hair and nails were normal.  NEUROLOGIC: The patient was alert and oriented to person, place, time, and circumstance. Speech was normal. Cranial nerves were normal. Motor strength was normal for age. The patient was normally coordinated.  PSYCHIATRIC:  Mood and affect were normal and the patient had normal recent and remote memory. The patient's judgment and insight were normal.  Prostate is enlarged, symmetric, nontender, no nodule     Additional Information   Time: total time spent with the patient was 45 minutes of which >50% was spent in counseling and coordination of care regarding above issues     Calixto Mancilla MD  Internal Medicine  Contact me at [phone]

## 2021-06-07 NOTE — PROGRESS NOTES
"Asael Crump is a 75 y.o. male who is being evaluated via a billable video visit.      The patient has been notified of following:     \"This video visit will be conducted via a call between you and your physician/provider. We have found that certain health care needs can be provided without the need for an in-person physical exam.  This service lets us provide the care you need with a video conversation.  If a prescription is necessary we can send it directly to your pharmacy.  If lab work is needed we can place an order for that and you can then stop by our lab to have the test done at a later time.    Video visits are billed at different rates depending on your insurance coverage. Please reach out to your insurance provider with any questions.    If during the course of the call the physician/provider feels a video visit is not appropriate, you will not be charged for this service.\"    Patient has given verbal consent to a Video visit? Yes    Patient would like to receive their AVS by AVS Preference: Mail a copy.    Patient would like the video invitation sent by: Text to cell phone: [phone]    Will anyone else be joining your video visit? No    Video Start Time: 12:52 PM    Additional provider notes: GENERAL: healthy, alert and no distress, EYES: Eyes grossly normal to inspection, conjunctivae and sclerae normal, RESP: no audible wheeze, cough, or visible cyanosis.  No visible retractions or increased work of breathing.  Able to speak fully in complete sentences., NEURO: Cranial nerves grossly intact, mentation intact and speech normal and PSYCH: mentation appears normal, affect normal/bright, judgement and insight intact, normal speech and appearance well-groomed      Video-Visit Details    Type of service:  Video Visit    Video End Time (time video stopped): 1:10 PM  Originating Location (pt. Location): Home    Distant Location (provider location):  [facility] INTER[facility] MEDIC[facility]     Mode of Communication:  " Video Conference via [employer]    Calixto Mancilla MD       Video Visit - Follow Up   Asael CALDWELL Theron   75 y.o. male    Date of Visit: 4/22/2020    Chief Complaint   Patient presents with     Pain     throughout his body        Assessment and Plan   1. Primary osteoarthritis involving multiple joints  I suspect a lot of his issues may be from osteoarthritis.  The lateral hip pain could be radicular from the back or could be some bursitis of the trochanteric bursa.  Okay to use occasional ibuprofen.  Also discussed acetaminophen and if covered by insurance a trial of Voltaren gel.  Ultimately if his symptoms persist I would recommend that he be seen in clinic for a good physical exam.  - diclofenac sodium (VOLTAREN) 1 % Gel; Apply 2-4 g topically 4 (four) times a day. 2g to small joints (hands) and 4g to large joints (knees)  Dispense: 500 g; Refill: 5    2. Pain of toe of right foot  - Ambulatory referral to Podiatry    3. CAD, MI 2002 with stent  He is taking aspirin and statin.  Discussed interaction of aspirin and ibuprofen.  He will be sure to take these with at least 6 to 8 hours of time between ibuprofen and aspirin.  Hopeful that the addition of Voltaren gel will reduce your eliminate the need for ibuprofen    4. BPH with urinary obstruction  - tamsulosin (FLOMAX) 0.4 mg cap; Take 1 capsule (0.4 mg total) by mouth daily after supper.  Dispense: 90 capsule; Refill: 3    Return in about 4 weeks (around 5/20/2020) for recheck.     History of Present Illness   This 75 y.o. old man is seen via video conference for evaluation of some pain issues he has been having.  About a month ago he started having some low back pain.  That progressed into some lateral hip pain especially when he would sleep on the side or go from sitting to standing position.  He also had some left shin pain and has been having right toe pain.  He has a history of toe deformity for which she had surgery and he thinks this is returning.  He  is also had some pain in the MCP joints of the hands especially the thumbs.  No hot or swollen joints.  No specific injury.  He has been using about 800 mg of ibuprofen a day and this is been helpful.  He has no chest pain or shortness of breath.  Last visit I had asked him to start Flomax which he never did start he continues to have nocturia and he is interested in starting now.    Review of Systems: A comprehensive review of systems was negative except as noted.     Medications, Allergies and Problem List   Reviewed, reconciled and updated  Post Discharge Medication Reconciliation Status:      Additional Information   Current Outpatient Medications   Medication Sig Dispense Refill     aspirin 81 MG EC tablet Take 81 mg by mouth daily.       atenolol (TENORMIN) 25 MG tablet Take 1 tablet (25 mg total) by mouth daily. 90 tablet 3     cholecalciferol, vitamin D3, (VITAMIN D3) 2,000 unit cap Take by mouth daily.       ketoconazole (NIZORAL) 2 % cream ONE APPLICATION TWICE A DAY TOPICALLY APPLY TO GROIN FOR 3 WEEKS       simvastatin (ZOCOR) 20 MG tablet Take 1 tablet (20 mg total) by mouth at bedtime. 90 tablet 3     diclofenac sodium (VOLTAREN) 1 % Gel Apply 2-4 g topically 4 (four) times a day. 2g to small joints (hands) and 4g to large joints (knees) 500 g 5     tamsulosin (FLOMAX) 0.4 mg cap Take 1 capsule (0.4 mg total) by mouth daily after supper. 90 capsule 3     No current facility-administered medications for this visit.      Allergies   Allergen Reactions     Other Environmental Allergy      Tree pollen, dust, mold -- sneezing, itchy eyes       Social History     Tobacco Use     Smoking status: Former Smoker     Last attempt to quit: 2000     Years since quittin.3     Smokeless tobacco: Never Used   Substance Use Topics     Alcohol use: Yes     Comment: occasional     Drug use: No       Review and/or order of clinical lab tests:  Review and/or order of radiology tests:  Review and/or order of  medicine tests:  Discussion of test results with performing physician:  Decision to obtain old records and/or obtain history from someone other than the patient:  Review and summarization of old records and/or obtaining history from someone other than the patient and.or discussion of case with another health care provider:  Independent visualization of image, tracing or specimen itself:    Time:      Calixto Mancilla MD

## 2021-06-08 NOTE — TELEPHONE ENCOUNTER
Dr. Lagunas,  Prescription has been set up for you to send to the pharmacy again, since it looks like they didn't get it.    Please review remainder of the message and advise.  Thank you.  Simi WILLIS CMA/JACLYN....................8:15 AM

## 2021-06-08 NOTE — PROGRESS NOTES
"Asael Crump is a 75 y.o. male who is being evaluated via a billable video visit.      The patient has been notified of following:     \"This video visit will be conducted via a call between you and your physician/provider. We have found that certain health care needs can be provided without the need for an in-person physical exam.  This service lets us provide the care you need with a video conversation.  If a prescription is necessary we can send it directly to your pharmacy.  If lab work is needed we can place an order for that and you can then stop by our lab to have the test done at a later time.    Video visits are billed at different rates depending on your insurance coverage. Please reach out to your insurance provider with any questions.    If during the course of the call the physician/provider feels a video visit is not appropriate, you will not be charged for this service.\"    Patient has given verbal consent to a Video visit? Yes    Patient would like to receive their AVS by AVS Preference: Nicho.    Patient would like the video invitation sent by: Text to cell phone: [phone]    Will anyone else be joining your video visit? No        Additional provider notes:    Subjective findings: This video visit took place today with the patient's permission.  The patient indicated that he has been having some pain involving both feet.  The right foot is more painful than the left foot.  The patient is approximately 6 years status post cheilectomy of both feet.  The patient stated that the pain involves both feet and both legs.  He has also had some pain involving his upper extremities.  He stated that both shoulders have been causing him some pain approximately 3 weeks ago he had lower back pain which has been completely resolved.  He has been taking 800 mg of ibuprofen daily which have definitely given him some relief of his symptoms.  He stated that the pain is aggravated with weightbearing and " ambulation.  He has very little discomfort while resting.  He has had a chance to talk to his primary care physician regarding the symptoms but he was unable to determine the cause of his pain.  The patient stated that his foot pain is located on the top of his foot near the arch.  He has noticed some mild swelling of the right foot as well.  He has no redness.  He is able to maintain a fairly normal gait.    Assessment: Foot pain unknown etiology    Plan: I informed the patient that I would like for him to come to the clinic for a face-to-face visit so that I may examine him fully to determine the source of his pain.  I will recommend x-rays of both of his feet during the next visit.  The patient is to continue taking the 800 mg of ibuprofen.    Video-Visit Details    Type of service:  Video Visit      Originating Location (pt. Location): Home    Distant Location (provider location):  [facility]     Platform used for Video Visit: Homer    Pt is former patient, last time was seen it was in 2016. He had bilateral chiiectomy. Pt complaint right foot hurting past two months, and states that its swollen and pink, not hot to touch. Pt does have insoles for bilateral shoes.    Spent 10 minutes with rooming question on telephone.    Isadora Medina LPN

## 2021-06-08 NOTE — PATIENT INSTRUCTIONS - HE
[facility] (Please call to make an appointment)    [facility]  [address]  [place], [place].  Phone: [phone]    Other Locations: (Depending on location may be limited in what products are available)    [place]  [facility]  [address] [place] [address]  Phone [phone]    [facility]  [address]  [place] [address]  Phone: [phone]    [facility]   [address]  [place] [address]  Phone: [phone]    [facility]  [address]  [place] [address]  Phone: [phone]    [facility]  [address]  [address]  [place] [address]  Phone: [phone]    [facility]  [address]  [address]   [place] [address]   Phone: [phone]    [facility]  [address]  [place] [address] [phone]    [facility]  [address] [place] [address]   Phone: [phone]

## 2021-06-08 NOTE — PATIENT INSTRUCTIONS - HE
Please have bilateral foot x ray prior to apt make sure to be standing when they are taken standing. You can go to [facility] prior to apt in 2 weeks.

## 2021-06-08 NOTE — TELEPHONE ENCOUNTER
I'd like him to get some labs.  Please call him and help him makea lab appt  Orders have been placed

## 2021-06-08 NOTE — PROGRESS NOTES
Assessment/Plan:            Multiple joint pains.  No obvious etiology for this.  Blood work as ordered by Dr. Mancilla was essentially negative.  No obvious joint inflammation on examination today.  I discussed this with the patient and suggested we try an empirical course of prednisone 20 mg daily for a week.  He is agreeable to that and will follow-up next week with Dr. Mancilla.  He may need additional work-up.  No x-rays today.    Subjective:    Patient ID: Asael Crump is a 75 y.o. male.  This is a 75-year-old man who is a patient of Dr. Calixto Mancilla.  Today because of joint pains.  He did have a video visit with Dr. Gray recently.  The patient reports that about 1 month ago he began having some pains in the lower extremities around the ankles.  Since then the pain has migrated first to the upper legs and more recently to the shoulders especially on the right as well as the wrists and hands.  He has not noticed any significant loss of strength although feels weak because of the pain with activity.  Today the primary issues revolve around his hands and wrists.  One-sided does not seem to be worse than the other.  He has noticed no swelling.  He denies any fever or chills.  He does get some relief with ibuprofen.    Blood work had been ordered and was done.  It is essentially negative with a CK of 35, CRP of 0.5, hemoglobin of 13.9, white blood count of 7800, sed rate of 12 and a negative Lyme's test.  Wrist Pain              Review of Systems      Negative except as noted above.    Objective:    Physical Exam      On examination his blood pressure is 102/60.  Weight is 198 pounds.    Heart rhythm is stable with a rate of 58 and no ectopy.    There is no swelling in the lower extremities.    Examination of the upper extremities shows reasonable range of motion in the shoulders elbows hands and wrists.  I am unable to detect any change in skin color or temperature.  There is no swelling in the fingers or the hands  or the wrists.  Strength seems reasonable and range of motion is also good.    The patient is alert and oriented x3.

## 2021-06-08 NOTE — TELEPHONE ENCOUNTER
Called pt / he would like to speak to Dr Mancilla on Tuesday.  Offered video visit but pt declined.  Cherry Reyna, CMA

## 2021-06-08 NOTE — TELEPHONE ENCOUNTER
Question following Office Visit  When did you see your provider: 5/7/20  What is your question: Patient is looking the test results. Patient states since his visit on 5/7/20, his wrist, hand, and arm pain are even worse.    Okay to leave a detailed message: Yes

## 2021-06-08 NOTE — TELEPHONE ENCOUNTER
Wellness Screening Tool  Symptom Screening:  Do you have a:    Fever?  no    Cough?  no    Shortness of breath?  no  ? If yes, is this a change? no    Skin rash?  no  Within the past 14 days, have you been in contact with someone who:    Is currently being ruled out for COVID-19 (novel coronavirus)?  no    Has tested positive for COVID-19?  no    Has symptoms of a respiratory illness (fever, cough, shortness of breath)?  no  Have you recently traveled to an area with COVID-19 areas: no    Refer to the [facility] Coronavirus webpage for COVID-19 areas:    Within the past 3 weeks, have you been exposed to the following:    Pertussis?  no    Chicken pox?  no    Measles? no  Patient's appointment status: Provider Video Visit  Patient reminded that no visitors are allowed at this time due to COVID-19 concerns. If determined pt has symptoms and is still needed to be seen pt notified they must wear a mask upon entering the clinic.

## 2021-06-08 NOTE — PROGRESS NOTES
Assessment/Plan:      Pronation deformity  Metatarsalgia right foot  Hallux limitus both feet          Subjective:    Patient ID: Asael Crump is a 75 y.o. male who presented to the clinic today complaining of pain on the ball of his right foot near the second toe.  The patient is 5 years status post cheilectomy of both feet.  The patient stated that he has an aching type pain near the second toe with weightbearing and ambulation.  He likes to walk for exercise and this highly exacerbates his symptoms.  He denies any trauma.  He has not had any associated redness or swelling.  The pain is relieved with nonweightbearing..              Objective:    Physical Exam   Nails bilateral feet are normal length and color.  Skin bilaterally warm supple and intact.    DP PT pulses +2/4 bilateral feet.    Negative clonus, negative Babinski bilateral feet.    Range of motion within normal limits bilaterally with the exception of a decrease in range of motion at the first MPJ bilaterally.  There is decreased range of motion second metatarsal phalangeal joint right foot.  There is pain on range of motion of the second MPJ right foot.  There is also pain on palpation of the second MPJ right foot.  There is no edema or erythema noted right foot.  There is flattening of the medial longitudinal arch noted bilaterally.    Treatment: I have recommended orthotics.  The patient is to return to the clinic as needed.  He is to return to the clinic if his pain persist.

## 2021-06-08 NOTE — PROGRESS NOTES
"Asael Crump is a 75 y.o. male who is being evaluated via a billable video visit.      The patient has been notified of following:     \"This video visit will be conducted via a call between you and your physician/provider. We have found that certain health care needs can be provided without the need for an in-person physical exam.  This service lets us provide the care you need with a video conversation.  If a prescription is necessary we can send it directly to your pharmacy.  If lab work is needed we can place an order for that and you can then stop by our lab to have the test done at a later time.    Video visits are billed at different rates depending on your insurance coverage. Please reach out to your insurance provider with any questions.    If during the course of the call the physician/provider feels a video visit is not appropriate, you will not be charged for this service.\"    Patient has given verbal consent to a Video visit? Yes    Patient would like to receive their AVS by AVS Preference: Mail a copy.    Patient would like the video invitation sent by: Text to cell phone: [phone]    Will anyone else be joining your video visit? No    Video Start Time: 10:04 AM    Additional provider notes: GENERAL: Healthy, alert and no distress  EYES: Eyes grossly normal to inspection. No discharge or erythema, or obvious scleral/conjunctival abnormalities.  RESP: No audible wheeze, cough, or visible cyanosis.  No visible retractions or increased work of breathing.    NEURO: Cranial nerves grossly intact. Mentation and speech appropriate for age.  PSYCH: Mentation appears normal, affect normal/bright, judgement and insight intact, normal speech and appearance well-groomed      Video-Visit Details    Type of service:  Video Visit    Video End Time (time video stopped): 10:14 AM  Originating Location (pt. Location): Home    Distant Location (provider location):  [facility] INTERNAL MEDICINE "     Platform used for Video Visit: [employer]      Calixto Mancilla MD       Video Visit - Follow Up   Asael Crump   75 y.o. male    Date of Visit: 6/4/2020    Chief Complaint   Patient presents with     Arm Pain     both        Assessment and Plan   1. Pain in both wrists  It is unclear what is driving his symptoms.  No evidence of inflammatory arthritis based on his description of his joints are my limited ability to see them via video visit.  There is also no laboratory evidence of inflammation.  Seems to be mostly located in the wrist now so we will obtain x-rays.  I suspect this may be an inflammatory type osteoarthritis.  We will taper down prednisone to 9 mg.  I have also asked him to see rheumatology  - XR Wrist Bilateral 2 VWS; Future  - predniSONE (DELTASONE) 5 MG tablet; Take 5 mg by mouth daily Take 5mg + 4mg for total of 9mg per day for 1 month.  Dispense: 30 tablet; Refill: 0  - Ambulatory referral to Rheumatology    2. Multiple joint pain  - predniSONE (DELTASONE) 1 MG tablet; Take 4 mg by mouth daily.  Dispense: 120 tablet; Refill: 0    3. CAD, MI 2002 with stent  Stop simvastatin start rosuvastatin  - rosuvastatin (CRESTOR) 10 MG tablet; Take 1 tablet (10 mg total) by mouth daily.  Dispense: 90 tablet; Refill: 3    Return in about 4 weeks (around 7/2/2020) for video visit.     History of Present Illness   This 75 y.o. old man is seen via video visit for follow-up of joint pain among other issues.  He has been having a lot of morning stiffness and joint pain.  Evaluation was fairly unremarkable including inflammatory markers and other blood work.  He saw Dr. Calixto Lagunas who put him on 20 mg of prednisone and he had immediate resolution of symptoms.  I taper him down to 10 mg over the past month and he still feels better than he did a month ago but is having some wrist achiness and pain in the morning.  He seems to improve after taking prednisone.  We had stopped simvastatin and this coincided  with improvement in his symptoms but he was also started on prednisone at the same time.  He has a history of coronary artery disease and remote history of stenting.  He has no current anginal chest pain.  He denies any joint swelling or red or hot swollen joints.    Review of Systems: A comprehensive review of systems was negative except as noted.     Medications, Allergies and Problem List   Reviewed, reconciled and updated  Post Discharge Medication Reconciliation Status:      Additional Information   Current Outpatient Medications   Medication Sig Dispense Refill     aspirin 81 MG EC tablet Take 81 mg by mouth daily.       atenolol (TENORMIN) 25 MG tablet Take 1 tablet (25 mg total) by mouth daily. 90 tablet 3     cholecalciferol, vitamin D3, (VITAMIN D3) 2,000 unit cap Take by mouth daily.       diclofenac sodium (VOLTAREN) 1 % Gel Apply 2-4 g topically 4 (four) times a day. 2g to small joints (hands) and 4g to large joints (knees) 500 g 5     ketoconazole (NIZORAL) 2 % cream ONE APPLICATION TWICE A DAY TOPICALLY APPLY TO GROIN FOR 3 WEEKS       predniSONE (DELTASONE) 1 MG tablet Take 4 mg by mouth daily. 120 tablet 0     predniSONE (DELTASONE) 5 MG tablet Take 5 mg by mouth daily Take 5mg + 4mg for total of 9mg per day for 1 month. 30 tablet 0     rosuvastatin (CRESTOR) 10 MG tablet Take 1 tablet (10 mg total) by mouth daily. 90 tablet 3     No current facility-administered medications for this visit.      Allergies   Allergen Reactions     Other Environmental Allergy      Tree pollen, dust, mold -- sneezing, itchy eyes       Social History     Tobacco Use     Smoking status: Former Smoker     Last attempt to quit: 2000     Years since quittin.4     Smokeless tobacco: Never Used   Substance Use Topics     Alcohol use: Yes     Comment: occasional     Drug use: No       Review and/or order of clinical lab tests:  Review and/or order of radiology tests:  Review and/or order of medicine  tests:  Discussion of test results with performing physician:  Decision to obtain old records and/or obtain history from someone other than the patient:  Review and summarization of old records and/or obtaining history from someone other than the patient and.or discussion of case with another health care provider:  Independent visualization of image, tracing or specimen itself:    Time:      Calixto Mancilla MD

## 2021-06-08 NOTE — TELEPHONE ENCOUNTER
It sounds like it could be rosuvastatin.  It also coincides with prednisone dose reduction, I think. Hold rosuvastatin, let's see how you feel over the next 5-7 days.  May need to go back up on prednisone if you don't feel better

## 2021-06-08 NOTE — PROGRESS NOTES
"Asael Crump is a 75 y.o. male who is being evaluated via a billable video visit.      The patient has been notified of following:     \"This video visit will be conducted via a call between you and your physician/provider. We have found that certain health care needs can be provided without the need for an in-person physical exam.  This service lets us provide the care you need with a video conversation.  If a prescription is necessary we can send it directly to your pharmacy.  If lab work is needed we can place an order for that and you can then stop by our lab to have the test done at a later time.    Video visits are billed at different rates depending on your insurance coverage. Please reach out to your insurance provider with any questions.    If during the course of the call the physician/provider feels a video visit is not appropriate, you will not be charged for this service.\"    Patient has given verbal consent to a Video visit? Yes    Patient would like to receive their AVS by AVS Preference: Mail a copy.    Patient would like the video invitation sent by: Text to cell phone: [phone]    Will anyone else be joining your video visit? No    Video Start Time: 1:57 PM    Additional provider notes: GENERAL: Healthy, alert and no distress  EYES: Eyes grossly normal to inspection. No discharge or erythema, or obvious scleral/conjunctival abnormalities.  RESP: No audible wheeze, cough, or visible cyanosis.  No visible retractions or increased work of breathing.    NEURO: Cranial nerves grossly intact. Mentation and speech appropriate for age.  PSYCH: Mentation appears normal, affect normal/bright, judgement and insight intact, normal speech and appearance well-groomed      Video-Visit Details    Type of service:  Video Visit    Video End Time (time video stopped): 2:14 PM  Originating Location (pt. Location): Home    Distant Location (provider location):  [facility] INTERNAL MEDICINE     Platform used for Video " Visit: Homer Mancilla MD       Video Visit - Follow Up   Asael CALDWELL Theron   75 y.o. male    Date of Visit: 5/21/2020    Chief Complaint   Patient presents with     Muscle Pain        Assessment and Plan   1. Multiple joint pain  I am not sure what we are treating.  Does not seem like polymyalgia rheumatica with normal inflammatory markers but he has responded quite briskly to prednisone.  We will start tapering this as below and I like to see him in follow-up in 2 weeks.  - predniSONE (DELTASONE) 5 MG tablet; Take 15 mg by mouth daily for 7 days, THEN 10 mg daily for 7 days.  Dispense: 35 tablet; Refill: 0      Return in about 2 weeks (around 6/4/2020) for video visit.     History of Present Illness   This 75 y.o. old man is seen in follow-up.  He has been having multiple joint aches and muscle aches that seem somewhat migratory.  I have checked inflammatory markers which are okay.  He saw Dr. Calixto Lagunas in clinic who put him on prednisone 20 mg.  This has completely resolved his symptoms.  He is wondering what the next step should be.  He is no longer using any pain medication.    Review of Systems: A comprehensive review of systems was negative except as noted.     Medications, Allergies and Problem List   Reviewed, reconciled and updated  Post Discharge Medication Reconciliation Status:      Additional Information   Current Outpatient Medications   Medication Sig Dispense Refill     aspirin 81 MG EC tablet Take 81 mg by mouth daily.       atenolol (TENORMIN) 25 MG tablet Take 1 tablet (25 mg total) by mouth daily. 90 tablet 3     cholecalciferol, vitamin D3, (VITAMIN D3) 2,000 unit cap Take by mouth daily.       diclofenac sodium (VOLTAREN) 1 % Gel Apply 2-4 g topically 4 (four) times a day. 2g to small joints (hands) and 4g to large joints (knees) 500 g 5     ketoconazole (NIZORAL) 2 % cream ONE APPLICATION TWICE A DAY TOPICALLY APPLY TO GROIN FOR 3 WEEKS       predniSONE (DELTASONE) 5 MG  tablet Take 15 mg by mouth daily for 7 days, THEN 10 mg daily for 7 days. 35 tablet 0     simvastatin (ZOCOR) 20 MG tablet Take 1 tablet (20 mg total) by mouth at bedtime. 90 tablet 3     tamsulosin (FLOMAX) 0.4 mg cap Take 1 capsule (0.4 mg total) by mouth daily after supper. 90 capsule 3     No current facility-administered medications for this visit.      Allergies   Allergen Reactions     Other Environmental Allergy      Tree pollen, dust, mold -- sneezing, itchy eyes       Social History     Tobacco Use     Smoking status: Former Smoker     Last attempt to quit: 2000     Years since quittin.4     Smokeless tobacco: Never Used   Substance Use Topics     Alcohol use: Yes     Comment: occasional     Drug use: No       Review and/or order of clinical lab tests:  Review and/or order of radiology tests:  Review and/or order of medicine tests:  Discussion of test results with performing physician:  Decision to obtain old records and/or obtain history from someone other than the patient:  Review and summarization of old records and/or obtaining history from someone other than the patient and.or discussion of case with another health care provider:  Independent visualization of image, tracing or specimen itself:    Time:      Calixto Mancilla MD

## 2021-06-09 NOTE — PROGRESS NOTES
"Office Visit - Physical    Asael CALDWLEL Theron   71 y.o. male    Date of Visit: 3/2/2017    Chief Complaint   Patient presents with     Annual Exam     Pt is fasting       Subjective: Here for regular annual exam and physical.  History of coronary disease with PTCA in 2002 had acute coronary syndrome at that time.  Has had no evidence of left ventricular dysfunction has been asymptomatic and has been several years since his last treadmill.  Continues on aspirin and a statin and beta blocker.  He has questions regarding the need for beta blocker we had a long discussion about this I do believe his atenolol can be discontinued    History of BPH his AUA score is 14 I will begin tamsulosin and this was discussed    History otherwise negative he is doing well in regard to his previous foot surgery    Personal social and family history reviewed no changes    Review of systems negative for new concerns    ROS: A comprehensive review of systems was performed and was otherwise negative except as mentioned above.    Exam   And oriented head and neck negative EENT negative lungs clear heart normal abdomen benign extremities normal neuro exam negative skin negative peripheral pulses rectal moderate prostatic enlargement without nodules  Visit Vitals     /64     Pulse 60     Ht 6' 2\" (1.88 m)     Wt 204 lb (92.5 kg)     BMI 26.19 kg/m2       Assessment and Plan    Stable physical exam.    Significant BPH will start an tamsulosin recheck AUA score at home in 1 month    Stop atenolol continue to follow symptoms continue with aspirin and statin    Labs pending and will be reviewed    Asael was seen today for annual exam.    Diagnoses and all orders for this visit:    Acute myocardial infarction  -     HM2(CBC w/o Differential); Future  -     Comprehensive Metabolic Panel; Future  -     Lipid Cascade; Future  -     PSA (Prostatic-Specific Antigen), Annual Screen  -     HM2(CBC w/o Differential)  -     Comprehensive Metabolic " Panel  -     Lipid [place]    Coronary atherosclerosis  -     HM2(CBC w/o Differential); Future  -     Comprehensive Metabolic Panel; Future  -     Lipid Cascade; Future  -     PSA (Prostatic-Specific Antigen), Annual Screen  -     HM2(CBC w/o Differential)  -     Comprehensive Metabolic Panel  -     Lipid [place]    BPH with urinary obstruction  -     HM2(CBC w/o Differential); Future  -     Comprehensive Metabolic Panel; Future  -     Lipid Cascade; Future  -     PSA (Prostatic-Specific Antigen), Annual Screen  -     HM2(CBC w/o Differential)  -     Comprehensive Metabolic Panel  -     Lipid Cascade    Screening for prostate cancer  -     HM2(CBC w/o Differential); Future  -     Comprehensive Metabolic Panel; Future  -     Lipid Cascade; Future  -     PSA (Prostatic-Specific Antigen), Annual Screen  -     HM2(CBC w/o Differential)  -     Comprehensive Metabolic Panel  -     Lipid Cascade    Hyperlipemia, mixed  -     HM2(CBC w/o Differential); Future  -     Comprehensive Metabolic Panel; Future  -     Lipid Cascade; Future  -     PSA (Prostatic-Specific Antigen), Annual Screen  -     HM2(CBC w/o Differential)  -     Comprehensive Metabolic Panel  -     Lipid Cascade    Other orders  -     simvastatin (ZOCOR) 20 MG tablet; Take 1 tablet (20 mg total) by mouth bedtime.  -     atenolol (TENORMIN) 25 MG tablet; Take 1 tablet (25 mg total) by mouth daily.  -     tamsulosin (FLOMAX) 0.4 mg Cp24; Take 1 capsule (0.4 mg total) by mouth daily after supper.              Medications:   Prior to Admission medications    Medication Sig Start Date End Date Taking? Authorizing Provider   aspirin 81 MG EC tablet Take 81 mg by mouth daily.   Yes PROVIDER, HISTORICAL   atenolol (TENORMIN) 25 MG tablet Take 1 tablet (25 mg total) by mouth daily. 3/2/17  Yes Smith Cota MD   cholecalciferol, vitamin D3, (VITAMIN D3) 2,000 unit cap Take by mouth daily.   Yes PROVIDER, HISTORICAL   simvastatin (ZOCOR) 20 MG tablet Take 1 tablet (20  mg total) by mouth bedtime. 3/2/17  Yes Smith Cota MD   atenolol (TENORMIN) 25 MG tablet Take 1 tablet (25 mg total) by mouth daily. 9/8/16 3/2/17 Yes Smith Cota MD   simvastatin (ZOCOR) 20 MG tablet Take 1 tablet (20 mg total) by mouth bedtime. 9/8/16 3/2/17 Yes Smith Cota MD   tamsulosin (FLOMAX) 0.4 mg Cp24 Take 1 capsule (0.4 mg total) by mouth daily after supper. 3/2/17   Smith Cota MD       Allergies:  Allergies   Allergen Reactions     Other Environmental Allergy      Tree pollen, dust, mold -- sneezing, itchy eyes         Immunizations:   Immunization History   Administered Date(s) Administered     DT (pediatric) 04/01/1999     Influenza high dose, seasonal 10/15/2016     Influenza, inj, historic 09/20/2010     Influenza, seasonal,quad inj 6-35 mos 09/24/2009, 10/15/2012, 10/24/2013     Pneumo Conj 13-V (2010&after) 12/19/2014     Pneumo Polysac 23-V 05/03/2012     Td, historic 09/23/2009     Tdap 09/23/2009       Past Medical History: No past medical history on file.    Past Surgical History:   Past Surgical History:   Procedure Laterality Date     CORONARY STENT PLACEMENT      10-12 years ago right     NE HALLUX RIGIDUS W/CHEILECTOMY 1ST MP JT W/O IMPLT Bilateral 5/15/2015    Procedure: CHEILECTOMY BOTH FEET;  Surgeon: Ramon Corey DPM;  Location: [facility];  Service: Podiatry       Family History: No family history on file.    Social History:   Social History     Social History     Marital status:      Spouse name: N/A     Number of children: N/A     Years of education: N/A     Occupational History     Not on file.     Social History Main Topics     Smoking status: Former Smoker     Quit date: 12/19/2000     Smokeless tobacco: Not on file     Alcohol use Yes      Comment: occasional     Drug use: No     Sexual activity: Not on file     Other Topics Concern     Not on file     Social History Narrative         Smith Cota MD

## 2021-06-09 NOTE — PROGRESS NOTES
"Asael Crump is a 75 y.o. male who is being evaluated via a billable video visit.      The patient has been notified of following:     \"This video visit will be conducted via a call between you and your physician/provider. We have found that certain health care needs can be provided without the need for an in-person physical exam.  This service lets us provide the care you need with a video conversation.  If a prescription is necessary we can send it directly to your pharmacy.  If lab work is needed we can place an order for that and you can then stop by our lab to have the test done at a later time.    Video visits are billed at different rates depending on your insurance coverage. Please reach out to your insurance provider with any questions.    If during the course of the call the physician/provider feels a video visit is not appropriate, you will not be charged for this service.\"    Patient has given verbal consent to a Video visit? Yes  How would you like to obtain your AVS? AVS Preference: Mail a copy.  Patient would like the video invitation sent by: Text to cell phone: [phone]  Will anyone else be joining your video visit? No    Video Start Time: 9:05 AM    Additional provider notes: GENERAL: Healthy, alert and no distress  EYES: Eyes grossly normal to inspection. No discharge or erythema, or obvious scleral/conjunctival abnormalities.  RESP: No audible wheeze, cough, or visible cyanosis.  No visible retractions or increased work of breathing.    NEURO: Cranial nerves grossly intact. Mentation and speech appropriate for age.  PSYCH: Mentation appears normal, affect normal/bright, judgement and insight intact, normal speech and appearance well-groomed      Video-Visit Details    Type of service:  Video Visit    Video End Time (time video stopped): 9:17 AM   Originating Location (pt. Location): Home    Distant Location (provider location):  [facility] INTERNAL MEDICINE     Platform used " for Video Visit: Homer Mancilla MD      Video Visit - Follow Up   Asael CALDWELL Theron   75 y.o. male    Date of Visit: 7/2/2020    Chief Complaint   Patient presents with     Wrist Pain     left        Assessment and Plan   1. Left wrist pain  This is the residual symptom from more systemic symptoms of proximal achiness in his thighs shoulders neck and numerous arthralgias.  This seemed to improve concurrently with cessation of statin as well as initiation of prednisone 20 mg daily.  We have tapered him down on prednisone to 9 mg and apart from some left wrist achiness and stiffness in the morning he feels well.  We did x-ray the wrist and it looks okay.  At this point I think the differential for his symptoms are polymyalgia rheumatica which seems unlikely with absence of elevation in inflammatory markers, statin myopathy which seems unlikely in the absence of CK elevation.  Could be a statin intolerance short of myopathy.  I have asked him to get lab work for uric acid rheumatoid factor ROBYN and CCP antibodies as well although no clear evidence of joint inflammation.  This is a bit limited because have only been able to see him virtually.  He will be seeing Dr. Stephen Finkelstein on August 4.  He will decrease prednisone to 7.5 mg now and continue this for 2 weeks and then stay on 5 mg until he sees rheumatology on August 4.  - Uric Acid; Future  - Rheumatoid Factor Quant; Future  - Antinuclear Antibody (ROBYN) Cascade; Future  - CCP Antibodies; Future  - predniSONE (DELTASONE) 2.5 MG tablet; Take 7.5 mg by mouth daily for 14 days, THEN 5 mg daily for 21 days.  Dispense: 84 tablet; Refill: 0    2. Myalgia  3. Morning stiffness of joints    4. Statin intolerance  We will discuss again on July 24 when I see him for an annual wellness visit.  His cholesterol is generally low but benefit of statin exists in reducing risk of recurrent heart attack or other vascular event.  We do not have as concrete of  data for injectable medicines in regards to prevention of cardiac events in the context of low or normal cholesterol for secondary prevention, but it may be reasonable to treat him with 1 although his lipids would significantly plummet with this.    Return in 22 days (on 7/24/2020) for annual physical.     History of Present Illness   This 75 y.o. old man in follow-up.  In review, he presented a couple months ago with significant achiness in his proximal legs shoulders, numerous joint pains.  I check some labs including inflammatory markers CK thyroid liver kidney test blood counts and everything checked out okay.  He saw my partner Dr. Calixto Lagunas who started him on prednisone 20 mg daily and immediately his symptoms improved.  We also concurrently asked him to stop statin.  He did this and this coincided with improvement in symptoms as well.  Subsequently I asked him to restart statin and he took 1 dose and all of his symptoms return.  His main symptom right now is left wrist pain when he wakes up in the morning.  We did have this x-rayed and it showed no inflammatory changes erosions or other significant findings.  He is feeling quite well and has resumed golfing.  Has a history of coronary artery disease and myocardial infarction in 2002.    Review of Systems: A comprehensive review of systems was negative except as noted.     Medications, Allergies and Problem List   Reviewed, reconciled and updated  Post Discharge Medication Reconciliation Status:      Additional Information   Current Outpatient Medications   Medication Sig Dispense Refill     aspirin 81 MG EC tablet Take 81 mg by mouth daily.       atenolol (TENORMIN) 25 MG tablet Take 1 tablet (25 mg total) by mouth daily. 90 tablet 3     cholecalciferol, vitamin D3, (VITAMIN D3) 2,000 unit cap Take by mouth daily.       diclofenac sodium (VOLTAREN) 1 % Gel Apply 2-4 g topically 4 (four) times a day. 2g to small joints (hands) and 4g to large joints  (knees) 500 g 5     ketoconazole (NIZORAL) 2 % cream ONE APPLICATION TWICE A DAY TOPICALLY APPLY TO GROIN FOR 3 WEEKS       rosuvastatin (CRESTOR) 10 MG tablet Take 1 tablet (10 mg total) by mouth daily. 90 tablet 3     predniSONE (DELTASONE) 2.5 MG tablet Take 7.5 mg by mouth daily for 14 days, THEN 5 mg daily for 21 days. 84 tablet 0     No current facility-administered medications for this visit.      Allergies   Allergen Reactions     Other Environmental Allergy      Tree pollen, dust, mold -- sneezing, itchy eyes       Social History     Tobacco Use     Smoking status: Former Smoker     Last attempt to quit: 2000     Years since quittin.5     Smokeless tobacco: Never Used   Substance Use Topics     Alcohol use: Yes     Comment: occasional     Drug use: No       Review and/or order of clinical lab tests:  Review and/or order of radiology tests:  Review and/or order of medicine tests:  Discussion of test results with performing physician:  Decision to obtain old records and/or obtain history from someone other than the patient:  Review and summarization of old records and/or obtaining history from someone other than the patient and.or discussion of case with another health care provider:  Independent visualization of image, tracing or specimen itself:    Time:      Calixto Mancilla MD

## 2021-06-09 NOTE — PROGRESS NOTES
Assessment and Plan:   1. Routine general medical examination at a health care facility  This is a generally healthy 5-year-old man with issues as discussed below    2. Atherosclerosis of native coronary artery of native heart without angina pectoris  Continue secondary prevention  - atenoloL (TENORMIN) 25 MG tablet; Take 1 tablet (25 mg total) by mouth daily.  Dispense: 90 tablet; Refill: 3  - rosuvastatin (CRESTOR) 10 MG tablet; Take 0.5 tablets (5 mg total) by mouth daily.  Dispense: 90 tablet; Refill: 3  - HM2(CBC w/o Differential)  - Comprehensive Metabolic Panel  - Lipid Orting    3. CAD, MI 2002 with stent  As above  - atenoloL (TENORMIN) 25 MG tablet; Take 1 tablet (25 mg total) by mouth daily.  Dispense: 90 tablet; Refill: 3  - rosuvastatin (CRESTOR) 10 MG tablet; Take 0.5 tablets (5 mg total) by mouth daily.  Dispense: 90 tablet; Refill: 3  - HM2(CBC w/o Differential)  - Comprehensive Metabolic Panel  - Lipid Cascade    4. Left wrist pain  5. Myalgia  Patient with profound myalgias and wrist pain.  We stop statin and this seemed to help and he was also started on prednisone.  We have been doing a fairly rapid taper of prednisone and he continues to do well.  His inflammatory markers were never elevated.  He is now on 5 mg of prednisone and he will see rheumatology early August.  I have asked him to restart rosuvastatin.  If symptoms return we have fairly good data that this is the cause.  Otherwise he should continue prednisone taper and we will await assessment from rheumatology    6. Hyperlipemia, mixed  As above    7. Suspected COVID-19 virus infection  - COVID-19 Virus (Coronavirus) Antibody & Titer Reflex; Future  - COVID-19 Virus (Coronavirus) Antibody & Titer Reflex    8. Advanced care planning/counseling discussion  We had a 7-minute discussion today.  Remains full code.  His wife would be his medical decision making event that he is unable to make decisions for himself    The patient's current  medical problems were reviewed.    I have had an Advance Directives discussion with the patient.  The following health maintenance schedule was reviewed with the patient and provided in printed form in the after visit summary:   Health Maintenance   Topic Date Due     ZOSTER VACCINES (1 of 2) 03/06/1995     TD 18+ HE  09/23/2019     INFLUENZA VACCINE RULE BASED (1) 08/01/2020     MEDICARE ANNUAL WELLNESS VISIT  07/24/2021     FALL RISK ASSESSMENT  07/24/2021     LIPID  04/22/2024     ADVANCE CARE PLANNING  04/22/2024     COLORECTAL CANCER SCREENING  11/09/2027     PNEUMOCOCCAL IMMUNIZATION 65+ LOW/MEDIUM RISK  Completed     HEPATITIS B VACCINES  Aged Out     HEPATITIS C SCREENING  Discontinued        Subjective:   Chief Complaint: Asael Crump is an 75 y.o. male here for an Annual Wellness visit.   HPI: This is a 75-year-old man comes in for annual wellness visit.  Overall doing okay.  Myalgias have resolved.  A little bit of left wrist pain.  Not on a statin.  Has been on simvastatin for years some then we stopped this because of his myalgias.  Otherwise denies chest pain shortness of breath.  Urinary symptoms stable.  Allergy symptoms stable.    Review of Systems:    Please see above.  The rest of the review of systems are negative for all systems.    Patient Care Team:  Calixto Mancilla MD as PCP - General (Internal Medicine)  Calixto Mancilla MD as Assigned PCP     Patient Active Problem List   Diagnosis     Allergic Rhinitis     Lichen Planus - oral     Hyperlipemia, mixed     CAD, MI 2002 with stent     BPH with urinary obstruction     Past Medical History:   Diagnosis Date     Allergic rhinitis     Created by Conversion  Replacement Utility updated for latest IMO load     BPH with urinary obstruction     Created by Conversion      CAD, MI 2002 with stent     Created by Conversion  Replacement Utility updated for latest IMO load     Hyperlipemia, mixed     Created by Conversion      Lichen Planus  - oral     Created by Conversion       Past Surgical History:   Procedure Laterality Date     CORONARY STENT PLACEMENT  2002     MO HALLUX RIGIDUS W/CHEILECTOMY 1ST MP JT W/O IMPLT Bilateral 5/15/2015    Procedure: CHEILECTOMY BOTH FEET;  Surgeon: Ramon Corey DPM;  Location: [facility];  Service: Podiatry      Family History   Problem Relation Age of Onset     Heart attack Father 67     COPD Father      Dementia Mother          97     Brain cancer Sister       Social History     Socioeconomic History     Marital status:      Spouse name: Not on file     Number of children: Not on file     Years of education: Not on file     Highest education level: Not on file   Occupational History     Not on file   Social Needs     Financial resource strain: Not on file     Food insecurity     Worry: Not on file     Inability: Not on file     Transportation needs     Medical: Not on file     Non-medical: Not on file   Tobacco Use     Smoking status: Former Smoker     Last attempt to quit: 2000     Years since quittin.6     Smokeless tobacco: Never Used   Substance and Sexual Activity     Alcohol use: Yes     Comment: occasional     Drug use: No     Sexual activity: Not on file   Lifestyle     Physical activity     Days per week: Not on file     Minutes per session: Not on file     Stress: Not on file   Relationships     Social connections     Talks on phone: Not on file     Gets together: Not on file     Attends Rastafari service: Not on file     Active member of club or organization: Not on file     Attends meetings of clubs or organizations: Not on file     Relationship status: Not on file     Intimate partner violence     Fear of current or ex partner: Not on file     Emotionally abused: Not on file     Physically abused: Not on file     Forced sexual activity: Not on file   Other Topics Concern     Not on file   Social History Narrative    Lives with wife, Christen.  Retired ,  "3M.  Dabbles in [place].  Two step daughters (Leia and Soledad), granddaughter and great-granddaughter.      Current Outpatient Medications   Medication Sig Dispense Refill     aspirin 81 MG EC tablet Take 81 mg by mouth daily.       atenoloL (TENORMIN) 25 MG tablet Take 1 tablet (25 mg total) by mouth daily. 90 tablet 3     cholecalciferol, vitamin D3, (VITAMIN D3) 2,000 unit cap Take by mouth daily.       diclofenac sodium (VOLTAREN) 1 % Gel Apply 2-4 g topically 4 (four) times a day. 2g to small joints (hands) and 4g to large joints (knees) 500 g 5     ketoconazole (NIZORAL) 2 % cream ONE APPLICATION TWICE A DAY TOPICALLY APPLY TO GROIN FOR 3 WEEKS       predniSONE (DELTASONE) 2.5 MG tablet Take 5 mg by mouth daily. 60 tablet 0     rosuvastatin (CRESTOR) 10 MG tablet Take 0.5 tablets (5 mg total) by mouth daily. 90 tablet 3     No current facility-administered medications for this visit.       Objective:   Vital Signs:   Visit Vitals  /60 (Patient Site: Left Arm, Patient Position: Sitting, Cuff Size: Adult Regular)   Pulse (!) 57   Ht 6' 2.25\" (1.886 m)   Wt 197 lb (89.4 kg)   SpO2 96%   BMI 25.12 kg/m           VisionScreening:  No exam data present     PHYSICAL EXAM  EYES: Eyelids, conjunctiva, and sclera were normal. Pupils were normal. Cornea, iris, and lens were normal bilaterally.  HEAD, EARS, NOSE, MOUTH, AND THROAT: Head and face were normal. Hearing was normal to voice and the ears were normal to external exam. Nose appearance was normal and there was no discharge. Oropharynx was normal.  NECK: Neck appearance was normal. There were no neck masses and the thyroid was not enlarged.  RESPIRATORY: Breathing pattern was normal and the chest moved symmetrically.  Percussion/auscultatory percussion was normal.  Lung sounds were normal and there were no abnormal sounds.  CARDIOVASCULAR: Heart rate and rhythm were normal.  S1 and S2 were normal and there were no extra sounds or murmurs. Peripheral " pulses in arms and legs were normal.  Jugular venous pressure was normal.  There was no peripheral edema.  GASTROINTESTINAL: The abdomen was normal in contour.  Bowel sounds were present.  Percussion detected no organ enlargement or tenderness.  Palpation detected no tenderness, mass, or enlarged organs.   MUSCULOSKELETAL: Skeletal configuration was normal and muscle mass was normal for age. Joint appearance was overall normal.  LYMPHATIC: There were no enlarged nodes.  SKIN/HAIR/NAILS: Skin color was normal.  There were no skin lesions.  Hair and nails were normal.  NEUROLOGIC: The patient was alert and oriented to person, place, time, and circumstance. Speech was normal. Cranial nerves were normal. Motor strength was normal for age. The patient was normally coordinated.  PSYCHIATRIC:  Mood and affect were normal and the patient had normal recent and remote memory. The patient's judgment and insight were normal.      Assessment Results 7/24/2020   Activities of Daily Living No help needed   Instrumental Activities of Daily Living No help needed   Get Up and Go Score Less than 12 seconds   Mini Cog Total Score 5   Some recent data might be hidden     A Mini-Cog score of 0-2 suggests the possibility of dementia, score of 3-5 suggests no dementia    Identified Health Risks:     The patient was counseled and encouraged to consider modifying their diet and eating habits. He was provided with information on recommended healthy diet options.  Information regarding advance directives (living gonzalez), including where he can download the appropriate form, was provided to the patient via the AVS.

## 2021-06-10 NOTE — TELEPHONE ENCOUNTER
Please mention to the patient:     There are some practitioners that are not even seeing new patients during COVID-19.  Since onset of pandemic, in trying to accommodate (as best as possible) and help/maintain the safety of patients and staff, we have offered video option, which many patients have been content with.     Given that patient does not have a comfort level with video visit, I believe patient would be best off scheduling with a rheumatologist that is seeing patients live at this time.      To my knowledge, Dr. Peng is seeing patients live on a limited basis (once a week) at this time, would it be possible to schedule patient with Dr. Peng?  If not, suggest scheduling with another live rheumatology group, given patient's concerns.

## 2021-06-12 NOTE — TELEPHONE ENCOUNTER
Central PA team  [phone]  Pool: HE PA MED ([id number])          PA has been initiated.       PA form completed and faxed insurance via Cover My Meds     Key:  [id number]      Medication:  PRALUENT     Insurance:  [employer]        Response will be received via fax and may take up to 5-10 business days depending on plan

## 2021-06-12 NOTE — TELEPHONE ENCOUNTER
----- Message from Jevon Wright DO sent at 10/7/2020 11:49 AM CDT -----  Mr. Crump need to also get setup to be on Repatha 140 mg two times a day for hyperlipidemia and coronary artery disease.  He is intolerant to multiple statins.  Known CAD with prior stent.

## 2021-06-12 NOTE — TELEPHONE ENCOUNTER
Per call to pharmacy copay for patient is $405.18.  Technician is going to call the patient with information.

## 2021-06-12 NOTE — TELEPHONE ENCOUNTER
Prior Authorization Request  Who s requesting:  Pharmacy  Pharmacy Name and Location [id number] [place]  Medication Name: Repatha  Insurance Plan: ?  Insurance Member ID Number: [id number]   CoverMyMeds Key: [id number])     Informed patient that prior authorizations can take up to 10 business days for response:   No  Okay to leave a detailed message: Yes

## 2021-06-12 NOTE — TELEPHONE ENCOUNTER
Separate encounter created for Praluent PA for tracking and reporting purposes.  Will update this encounter with PA outcome as well.

## 2021-06-12 NOTE — TELEPHONE ENCOUNTER
spoke with pt and he is going to call pharm and let them know that Prluent is non form. He will find out if price of over $400 is monthly, or 3 month or a deductible. He will call back with update.

## 2021-06-12 NOTE — TELEPHONE ENCOUNTER
Central PA team  [phone]  Pool: HE PA MED ([id number])          PA has been initiated.       PA form completed and faxed insurance via Cover My Meds     Key:  [id number]     Medication:  Repatha     Insurance:  [employer]        Response will be received via fax and may take up to 5-10 business days depending on plan

## 2021-06-13 NOTE — PROGRESS NOTES
Office Visit - Follow Up   Asael Crump   75 y.o. male    Date of Visit: 11/12/2020    Chief Complaint   Patient presents with     Arm Pain     right        Assessment and Plan   1. Chronic right shoulder pain  This seems much more musculoskeletal than a inflammatory myopathy arthralgia.  I doubt this is related to statin use.  I suspect it probably is related to his fall off of the ladder.  We will obtain an MRI and have him see orthopedic surgery  - MR Shoulder Without Contrast Right; Future    2. CAD, MI 2002 with stent  I strongly agree with him that he should be on a statin.  My suspicion is fairly low that his aches and pains are related to his statin myopathy or side effects.  I have encouraged him to start rosuvastatin once a week for about a month and then increase gradually up to daily.  Follow-up in 3 months for physical and we can revisit how he is doing at that time.  - rosuvastatin (CRESTOR) 5 MG tablet; Take 1 tablet (5 mg total) by mouth daily.  Dispense: 90 tablet; Refill: 3    3. Hyperlipemia, mixed  As above    Return in about 3 months (around 2/12/2021) for annual physical.     History of Present Illness   This 75 y.o. old man comes in for follow-up.  He has been struggling a bit with some muscle and joint aches.  He has been struggling with this since April.  There seems to be some correlation with statin use.  He also had inflammation of his left wrist.  His inflammatory markers have not been elevated.  Rheumatologic testing has been negative.  Lyme antibody testing negative.  Because of the wrist inflammation was started on prednisone and generally he is done better.  He continues to have right shoulder pain is his main issue.  It hurts when he outstretched his the arm especially laterally.  He now remembers just prior to onset of this he was in his garage working on a ladder and the ladder slipped and he caught himself with both wrist and fell onto his right side.  He does not recall  "injuring himself at that time but now in hindsight wonders if the left wrist became swollen after this fall and the shoulder started hurting him at that time.  He has been a little bit reluctant to share the story because he does not want his wife to know he fell off a ladder.  He is quite concerned about not being on a statin medication given his coronary heart history and he is reluctant to start Repatha due to the high cost.    Review of Systems: A comprehensive review of systems was negative except as noted.     Medications, Allergies and Problem List   Reviewed, reconciled and updated  Post Discharge Medication Reconciliation Status:      Physical Exam   General Appearance:   No acute distress    /68 (Patient Site: Left Arm, Patient Position: Sitting, Cuff Size: Adult Regular)   Pulse 66   Ht 6' 2.25\" (1.886 m)   Wt 201 lb (91.2 kg)   SpO2 99%   BMI 25.63 kg/m      HEENT exam is unremarkable  Neck supple no thyromegaly or nodule palpable  Lymphatic no cervical lymphadenopathy  Cardiovascular regular rate and rhythm no murmur gallop or rub  Pulmonary lungs are clear to auscultation bilaterally  Gastrointestinal abdomen soft nontender nondistended no organomegaly  Neurologic exam is non focal  Psychiatric pleasant, no confusion or agitation   Pain with abduction of the right shoulder     Additional Information   Current Outpatient Medications   Medication Sig Dispense Refill     aspirin 81 MG EC tablet Take 81 mg by mouth daily.       atenoloL (TENORMIN) 25 MG tablet Take 1 tablet (25 mg total) by mouth daily. 90 tablet 3     cholecalciferol, vitamin D3, (VITAMIN D3) 2,000 unit cap Take by mouth daily.       diclofenac sodium (VOLTAREN) 1 % Gel Apply 2-4 g topically 4 (four) times a day. 2g to small joints (hands) and 4g to large joints (knees) 500 g 5     ketoconazole (NIZORAL) 2 % cream ONE APPLICATION TWICE A DAY TOPICALLY APPLY TO GROIN FOR 3 WEEKS       rosuvastatin (CRESTOR) 5 MG tablet Take 1 " tablet (5 mg total) by mouth daily. 90 tablet 3     No current facility-administered medications for this visit.      Allergies   Allergen Reactions     Other Environmental Allergy      Tree pollen, dust, mold -- sneezing, itchy eyes       Social History     Tobacco Use     Smoking status: Former Smoker     Quit date: 2000     Years since quittin.9     Smokeless tobacco: Never Used   Substance Use Topics     Alcohol use: Yes     Comment: occasional     Drug use: No       Review and/or order of clinical lab tests:  Review and/or order of radiology tests:  Review and/or order of medicine tests:  Discussion of test results with performing physician:  Decision to obtain old records and/or obtain history from someone other than the patient:  Review and summarization of old records and/or obtaining history from someone other than the patient and.or discussion of case with another health care provider:  Independent visualization of image, tracing or specimen itself:    Time:      Calixto Mancilla MD

## 2021-06-13 NOTE — TELEPHONE ENCOUNTER
Spoke with pt and Praluent and Repatha will both be over $400.00 per month and he does not qualify for and finical aid due to income levels.   Pt is not taking Zetia. Took about 15 dose and quit. No reaction but felt it was not worth it. He is not going to buy the Repatha or Praluent so will remove from med list. Will update Dr. Wright.

## 2021-06-15 NOTE — PROGRESS NOTES
"  Office Visit - Follow Up   Asael CALDWELL Theron   75 y.o. male    Date of Visit: 2/12/2021    Chief Complaint   Patient presents with     Shoulder Pain     right        Assessment and Plan   1. Chronic right shoulder pain  Continue with current plan as outlined by orthopedic surgery, physical therapy.  We reviewed his orthopedic surgery note as well as his MRI.  Recommended use of Voltaren gel 4 times a day.  If this not effective could consider Celebrex    2. CAD, MI 2002 with stent  Continue rosuvastatin secondary prevention  - rosuvastatin (CRESTOR) 10 MG tablet; Take 1 tablet (10 mg total) by mouth daily.    3. Hyperlipemia, mixed  As above    Return in about 3 months (around 5/12/2021) for recheck.     History of Present Illness   This 75 y.o. old man comes in with his wife for follow-up.  Overall he is doing okay.  He had had some arthralgias/myalgias and we are concerned about a statin myopathy.  We stopped rosuvastatin and this did not really improve.  Pain is now isolated in the right shoulder.  He has had an MRI which showed some tendinopathy which is mild to moderate.  He saw Dr. Nguyễn.  He had an injection into the subacromial bursa as well as a bicep tendon without improvement in his symptoms.  He has now going to physical therapy and have been documented improvement in range of motion although not so much in his symptoms or function.  He is having no chest pain or shortness of breath.    Review of Systems: A comprehensive review of systems was negative except as noted.     Medications, Allergies and Problem List   Reviewed, reconciled and updated  Post Discharge Medication Reconciliation Status:      Physical Exam   General Appearance:   No acute distress    /64 (Patient Site: Left Arm, Patient Position: Sitting, Cuff Size: Adult Regular)   Pulse (!) 59   Ht 6' 2.25\" (1.886 m)   Wt 198 lb (89.8 kg)   SpO2 97%   BMI 25.25 kg/m      He has good strength in his upper extremities.  Reflexes " intact.  He has decreased range of motion internally and is unable to extend his arm above his head.  Pain with palpation along the deltoid and lateral aspect of the right upper arm.       Additional Information   Current Outpatient Medications   Medication Sig Dispense Refill     aspirin 81 MG EC tablet Take 81 mg by mouth daily.       atenoloL (TENORMIN) 25 MG tablet Take 1 tablet (25 mg total) by mouth daily. 90 tablet 3     cholecalciferol, vitamin D3, (VITAMIN D3) 2,000 unit cap Take by mouth daily.       diclofenac sodium (VOLTAREN) 1 % Gel Apply 2-4 g topically 4 (four) times a day. 2g to small joints (hands) and 4g to large joints (knees) 500 g 5     ketoconazole (NIZORAL) 2 % cream ONE APPLICATION TWICE A DAY TOPICALLY APPLY TO GROIN FOR 3 WEEKS       rosuvastatin (CRESTOR) 10 MG tablet Take 1 tablet (10 mg total) by mouth daily.       No current facility-administered medications for this visit.      Allergies   Allergen Reactions     Other Environmental Allergy      Tree pollen, dust, mold -- sneezing, itchy eyes       Social History     Tobacco Use     Smoking status: Former Smoker     Quit date: 2000     Years since quittin.1     Smokeless tobacco: Never Used   Substance Use Topics     Alcohol use: Yes     Comment: occasional     Drug use: No       Review and/or order of clinical lab tests:  Review and/or order of radiology tests:  Review and/or order of medicine tests:  Discussion of test results with performing physician:  Decision to obtain old records and/or obtain history from someone other than the patient:  Review and summarization of old records and/or obtaining history from someone other than the patient and.or discussion of case with another health care provider:  Independent visualization of image, tracing or specimen itself:    Time:      Calixto Mancilla MD

## 2021-06-16 NOTE — TELEPHONE ENCOUNTER
Pt refused Praluent and has gone back on Rosuvastatin 10 mg with no issues. Will update med list and Dr. Wright.

## 2021-06-16 NOTE — PROGRESS NOTES
"Office Visit - Physical    Asael CALDWELL Theron   73 y.o. male    Date of Visit: 3/22/2018    Chief Complaint   Patient presents with     Annual Wellness Visit     Pt is fasting       Subjective: Pleasant 73-year-old gentleman with remote history of coronary disease and history of hyperlipidemia here for annual wellness visit and physical exam    Health risk assessment patient questionnaire advanced directives cognitive assessment and PHQ 2 are negative.  Generally otherwise has been healthy without major health concerns or complaints    Significant symptoms of prostatic enlargement with AUA score ranging from 5-8.  Did not feel tamsulosin was helpful and indicated he had ejaculatory concerns    Medical history otherwise unremarkable as noted history of coronary disease with ACS in 2002 stable there went PTCA with stent placement no evidence of heart failure or of recurrent angina    Hyperlipidemia by history stable on current regimen    History of mild hypertension however has been off of atenolol with normal pressure    No other major health concerns    Personal social and family history reviewed no change    ROS: A comprehensive review of systems was performed and was otherwise negative except as mentioned above.    Exam   Normal mental status no cognitive dysfunction EENT negative head and neck unremarkable skin and lymphatics normal lungs clear heart normal abdomen benign extremities negative peripheral pulses normal neuro negative  /70  Pulse 78  Ht 6' 2.25\" (1.886 m)  Wt 201 lb (91.2 kg)  BMI 25.63 kg/m2    Assessment and Plan    Stable physical exam/wellness exam    No change in regimen will review labs when available  Asael was seen today for annual wellness visit.    Diagnoses and all orders for this visit:    Routine general medical examination at a health care facility  -     2(CBC w/o Differential); Future  -     Comprehensive Metabolic Panel; Future  -     HM2(CBC w/o Differential)  -     " Comprehensive Metabolic Panel    Acute non-ST elevation myocardial infarction (NSTEMI)  -     HM2(CBC w/o Differential); Future  -     Comprehensive Metabolic Panel; Future  -     HM2(CBC w/o Differential)  -     Comprehensive Metabolic Panel    Simple chronic serous otitis media  -     HM2(CBC w/o Differential); Future  -     Comprehensive Metabolic Panel; Future  -     HM2(CBC w/o Differential)  -     Comprehensive Metabolic Panel    Hyperlipemia, mixed  -     HM2(CBC w/o Differential); Future  -     Comprehensive Metabolic Panel; Future  -     Lipid Cascade; Future  -     HM2(CBC w/o Differential)  -     Comprehensive Metabolic Panel  -     Lipid Cascade    Screening for prostate cancer  -     HM2(CBC w/o Differential); Future  -     Comprehensive Metabolic Panel; Future  -     PSA (Prostatic-Specific Antigen), Annual Screen  -     HM2(CBC w/o Differential)  -     Comprehensive Metabolic Panel              Medications:   Prior to Admission medications    Medication Sig Start Date End Date Taking? Authorizing Provider   aspirin 81 MG EC tablet Take 81 mg by mouth daily.   Yes PROVIDER, HISTORICAL   cholecalciferol, vitamin D3, (VITAMIN D3) 2,000 unit cap Take by mouth daily.   Yes PROVIDER, HISTORICAL   simvastatin (ZOCOR) 20 MG tablet Take 1 tablet (20 mg total) by mouth bedtime. 3/2/17  Yes Smith Cota MD   tamsulosin (FLOMAX) 0.4 mg Cp24 Take 1 capsule (0.4 mg total) by mouth daily after supper. 3/2/17   Smith Cota MD   atenolol (TENORMIN) 25 MG tablet Take 1 tablet (25 mg total) by mouth daily. 3/2/17 3/22/18  Smith Cota MD       Allergies:  Allergies   Allergen Reactions     Other Environmental Allergy      Tree pollen, dust, mold -- sneezing, itchy eyes         Immunizations:   Immunization History   Administered Date(s) Administered     DT (pediatric) 04/01/1999     Influenza high dose, seasonal 10/15/2016     Influenza, inj, historic,unspecified 09/20/2010     Influenza, seasonal,quad inj  6-35 mos 09/24/2009, 10/15/2012, 10/24/2013     Pneumo Conj 13-V (2010&after) 12/19/2014     Pneumo Polysac 23-V 05/03/2012     Td,adult,historic,unspecified 09/23/2009     Tdap 09/23/2009       Past Medical History: No past medical history on file.    Past Surgical History:   Past Surgical History:   Procedure Laterality Date     CORONARY STENT PLACEMENT      10-12 years ago right     FL HALLUX RIGIDUS W/CHEILECTOMY 1ST MP JT W/O IMPLT Bilateral 5/15/2015    Procedure: CHEILECTOMY BOTH FEET;  Surgeon: Ramon Corey DPM;  Location: [facility] OR;  Service: Podiatry       Family History: No family history on file.    Social History:   Social History     Social History     Marital status:      Spouse name: N/A     Number of children: N/A     Years of education: N/A     Occupational History     Not on file.     Social History Main Topics     Smoking status: Former Smoker     Quit date: 12/19/2000     Smokeless tobacco: Never Used     Alcohol use Yes      Comment: occasional     Drug use: No     Sexual activity: Not on file     Other Topics Concern     Not on file     Social History Narrative         Smith Cota MD        Assessment and Plan:           The patient's current medical problems were reviewed.      The following health maintenance schedule was reviewed with the patient and provided in printed form in the after visit summary:   Health Maintenance   Topic Date Due     ZOSTER VACCINE  03/06/2005     INFLUENZA VACCINE RULE BASED (1) 08/01/2017     FALL RISK ASSESSMENT  03/02/2018     TD 18+ HE  09/23/2019     ADVANCE DIRECTIVES DISCUSSED WITH PATIENT  12/19/2019     COLONOSCOPY  11/09/2027     PNEUMOCOCCAL POLYSACCHARIDE VACCINE AGE 65 AND OVER  Completed     PNEUMOCOCCAL CONJUGATE VACCINE FOR ADULTS (PCV13 OR PREVNAR)  Completed        Subjective:   Chief Complaint: Asael Crump is an 73 y.o. male here for an Annual Wellness visit.   HPI:      Review of Systems:    Please see above.  The  "rest of the review of systems are negative for all systems.    Patient Care Team:  Smith Cota MD as PCP - General     Patient Active Problem List   Diagnosis     Chronic Serous Otitis Media     Allergic Rhinitis     Lichen Planus     Pharyngitis     Hyperlipemia, mixed     Migraine Headache     Acute Myocardial Infarction     Coronary Artery Disease     BPH with urinary obstruction     Other acquired deformity of toe     Screening for prostate cancer     No past medical history on file.   Past Surgical History:   Procedure Laterality Date     CORONARY STENT PLACEMENT      10-12 years ago right     LA HALLUX RIGIDUS W/CHEILECTOMY 1ST MP JT W/O IMPLT Bilateral 5/15/2015    Procedure: CHEILECTOMY BOTH FEET;  Surgeon: Ramon Corey DPM;  Location: [facility];  Service: Podiatry      No family history on file.   Social History     Social History     Marital status:      Spouse name: N/A     Number of children: N/A     Years of education: N/A     Occupational History     Not on file.     Social History Main Topics     Smoking status: Former Smoker     Quit date: 12/19/2000     Smokeless tobacco: Never Used     Alcohol use Yes      Comment: occasional     Drug use: No     Sexual activity: Not on file     Other Topics Concern     Not on file     Social History Narrative      Current Outpatient Prescriptions   Medication Sig Dispense Refill     aspirin 81 MG EC tablet Take 81 mg by mouth daily.       cholecalciferol, vitamin D3, (VITAMIN D3) 2,000 unit cap Take by mouth daily.       simvastatin (ZOCOR) 20 MG tablet Take 1 tablet (20 mg total) by mouth bedtime. 90 tablet 3     tamsulosin (FLOMAX) 0.4 mg Cp24 Take 1 capsule (0.4 mg total) by mouth daily after supper. 30 capsule 12     No current facility-administered medications for this visit.       Objective:   Vital Signs:   Visit Vitals     /70     Pulse 78     Ht 6' 2.25\" (1.886 m)     Wt 201 lb (91.2 kg)     BMI 25.63 kg/m2    "     VisionScreening:  No exam data present     PHYSICAL EXAM    Assessment Results 3/22/2018   Activities of Daily Living No help needed   Instrumental Activities of Daily Living No help needed   Get Up and Go Score Less than 12 seconds   Mini Cog Total Score 5   Some recent data might be hidden     A Mini-Cog score of 0-2 suggests the possibility of dementia, score of 3-5 suggests no dementia    Identified Health Risks:     The patient was counseled and encouraged to consider modifying their diet and eating habits. He was provided with information on recommended healthy diet options.  Information regarding advance directives (living gonzalez), including where he can download the appropriate form, was provided to the patient via the AVS.

## 2021-06-16 NOTE — TELEPHONE ENCOUNTER
Prior Authorization Request  Who s requesting:  Pharmacy  Pharmacy Name and Location: [facility]  Medication Name: Repatha  Insurance Plan: [employer]  Insurance Member ID Number:  [id number]  CoverMyMeds Key: [id number]  Informed patient that prior authorizations can take up to 10 business days for response:   No  Okay to leave a detailed message: Yes

## 2021-06-17 NOTE — TELEPHONE ENCOUNTER
Telephone Encounter by Marie Winslow at 10/26/2020  2:55 PM     Author: Marie Winslow Service: -- Author Type: --    Filed: 10/26/2020  2:56 PM Encounter Date: 10/7/2020 Status: Signed    : Marie Winslow       PRIOR AUTHORIZATION DENIED    Denial Rational: [employer] is requiring patient to try/fail Repatha first which is formulary         Appeal Information: This medication was denied. If physician would like to appeal because patient has contraindication or allergy to covered medication please write letter of medical necessity and route back to PA team to initiate.  If no further action is needed please close encounter thank you.

## 2021-06-17 NOTE — TELEPHONE ENCOUNTER
Telephone Encounter by Zahra Mckeon at 4/20/2021 12:11 PM     Author: Zahra Mckeon Service: -- Author Type: Financial Resource Guide    Filed: 4/20/2021 12:20 PM Encounter Date: 4/20/2021 Status: Signed    : Zahra Mckeon (Financial Resource Guide)       PA Initiation  Medication: Praluent 75mg/ml  QTY: 2 pens for 28 days  Insurance Company: [employer] Part D  Pharmacy Filing Rx: pending  Filling Pharmacy Phone: na  Filling Pharmacy Fax: na  Start Date: 4/20/21  Additional Information: formulary switch from repatha to praluent

## 2021-06-17 NOTE — TELEPHONE ENCOUNTER
Telephone Encounter by Marie Winslow at 10/26/2020  2:58 PM     Author: Marie Winslow Service: -- Author Type: --    Filed: 10/26/2020  2:58 PM Encounter Date: 10/26/2020 Status: Signed    : Marie Winslow       PRIOR AUTHORIZATION DENIED    Denial Rational: Try/fail Repatha first        Appeal Information: This medication was denied. If physician would like to appeal because patient has contraindication or allergy to covered medication please write letter of medical necessity and route back to PA team to initiate.  If no further action is needed please close encounter thank you.

## 2021-06-17 NOTE — TELEPHONE ENCOUNTER
Telephone Encounter by Zahra Mckeon at 11/10/2020  1:55 PM     Author: Zahra Mckeon Service: -- Author Type: Financial Resource Guide    Filed: 11/10/2020  2:06 PM Encounter Date: 11/10/2020 Status: Signed    : Zahra Mckeon (Financial Resource Guide)       Spoke with the [place]. He makes well over 500% of Poverty Guidelines which makes him not eligible for any of the grants or patient assistance programs for both Repatha and [facility].

## 2021-06-17 NOTE — TELEPHONE ENCOUNTER
Telephone Encounter by Marie Winslow at 10/16/2020 12:11 PM     Author: Marie Winslow Service: -- Author Type: --    Filed: 10/16/2020 12:12 PM Encounter Date: 10/7/2020 Status: Signed    : Marie Winslow APPROVED:    Approval start date: 10/16/2020  Approval end date:  4/14/2021    Pharmacy has been notified of approval and will contact patient when medication is ready for pickup.

## 2021-06-17 NOTE — PATIENT INSTRUCTIONS - HE
Patient Instructions by Smith Cota MD at 4/22/2019  8:20 AM     Author: Smith Cota MD Service: -- Author Type: Physician    Filed: 4/22/2019  8:33 AM Encounter Date: 4/22/2019 Status: Signed    : Smith Cota MD (Physician)         Patient Education   Understanding USDA MyPlate  The USDA (US Department of Agriculture) has guidelines to help you make healthy food choices. These are called MyPlate. MyPlate shows the food groups that make up healthy meals using the image of a place setting. Before you eat, think about the healthiest choices for what to put onto your plate or into your cup or bowl. To learn more about building a healthy plate, visit www.choosemyplate.gov.       The Food Groups    Fruits: Any fruit or 100% fruit juice counts as part of the Fruit Group. Fruits may be fresh, canned, frozen, or dried, and may be whole, cut-up, or pureed. Make half your plate fruits and vegetables.    Vegetables: Any vegetable or 100% vegetable juice counts as a member of the Vegetable Group. Vegetables may be fresh, frozen, canned, or dried. They can be served raw or cooked and may be whole, cut-up, or mashed. Make half your plate fruits and vegetables.     Grains: All foods made from grains are part of the Grains Group. These include wheat, rice, oats, cornmeal, and barley such as bread, pasta, oatmeal, cereal, tortillas, and grits. Grains should be no more than a quarter of your plate. At least half of your grains should be whole grains.    Protein: This group includes meat, poultry, seafood, beans and peas, eggs, processed soy products (like tofu), nuts (including nut butters), and seeds. Make protein choices no more than a quarter of your plate. Meat and poultry choices should be lean or low fat.    Dairy: All fluid milk products and foods made from milk that contain calcium, like yogurt and cheese are part of the Dairy Group. (Foods that have little calcium, such as cream, butter, and cream  cheese, are not part of the group.) Most dairy choices should be low-fat or fat-free.    Oils: These are fats that are liquid at room temperature. They include canola, corn, olive, soybean, and sunflower oil. Foods that are mainly oil include mayonnaise, certain salad dressings, and soft margarines. You should have only 5 to 7 teaspoons of oils a day. You probably already get this much from the food you eat.  Use [employer] to Help Build Your Meals  The [employer] can help you plan and track your meals and activity. You can look up individual foods to see or compare their nutritional value. You can get guidelines for what and how much you should eat. You can compare your food choices. And you can assess personal physical activities and see ways you can improve. Go to www.[employer].gov/UBIKODcker/.    2198-1009 [employer]. [address] [place] [address]. All rights reserved. This information is not intended as a substitute for professional medical care. Always follow your healthcare professional's instructions.           Patient Education   Understanding Advance Care Planning  Advance care planning is the process of deciding ones own future medical care. It helps ensure that if you cant speak for yourself, your wishes can still be carried out. The plan is a series of legal documents that note a persons wishes. The documents vary by state. Advance care planning may be done when a person has a serious illness that is expected to get worse. It may be done before major surgery. And it can help you and your family be prepared in case of a major illness or injury. Advance care planning helps with making decisions at these times.       A health care proxy is a person who acts as the voice of a patient when the patient cant speak for himself or herself. The name of this role varies by state. It may be called a Durable Medical Power of  or Durable Power of  for Healthcare.  It may be called an agent, surrogate, or advocate. Or it may be called a representative or decision maker. It is an official duty that is identified by a legal document. The document also varies by state.    Why Is Advance Care Planning Important?  If a person communicates their healthcare wishes:    They will be given medical care that matches their values and goals.    Their family members will not be forced to make decisions in a crisis with no guidance.  Creating a Plan  Making an advance care plan is often done in 3 steps:    Thinking about ones wishes. To create an advance care plan, you should think about what kind of medical treatment you would want if you lose the ability to communicate. Are there any situations in which you would refuse or stop treatment? Are there therapies you would want or not want? And whom do you want to make decisions for you? There are many places to learn more about how to plan for your care. Ask your doctor or  for resources.    Picking a health care proxy. This means choosing a trusted person to speak for you only when you cant speak for yourself. When you cannot make medical decisions, your proxy makes sure the instructions in your advance care plan are followed. A proxy does not make decisions based on his or her own opinions. They must put aside those opinions and values if needed, and carry out your wishes.    Filling out the legal documents. There are several kinds of legal documents for advance care planning. Each one tells health care providers your wishes. The documents may vary by state. They must be signed and may need to be witnessed or notarized. You can cancel or change them whenever you wish. Depending on your state, the documents may include a Healthcare Proxy form, Living Will, Durable Medical Power of , Advance Directive, or others.  The Familys Role  The best help a family can give is to support their loved ones wishes. Open and honest  communication is vital. Family should express any concerns they have about the patients choices while the patient can still make decisions.    9267-5374 [employer]. [address] [place] [address], [place] [address]. All rights reserved. This information is not intended as a substitute for professional medical care. Always follow your healthcare professional's instructions.         Also, [facility] offers a free, downloadable health care directive that allows you to share your treatment choices and personal preferences if you cannot communicate your wishes. It also allows you to appoint another person (called a health care agent) to make health care decisions if you are unable to do so. You can download an advance directive by going here: [email or web address]     Patient Education   Personalized Prevention Plan  You are due for the preventive services outlined below.  Your care team is available to assist you in scheduling these services.  If you have already completed any of these items, please share that information with your care team to update in your medical record.  Health Maintenance   Topic Date Due   ? ZOSTER VACCINES (1 of 2) 03/06/1995   ? INFLUENZA VACCINE RULE BASED (1) 08/01/2018   ? FALL RISK ASSESSMENT  03/22/2019   ? TD 18+ HE  09/23/2019   ? ADVANCE DIRECTIVES DISCUSSED WITH PATIENT  12/19/2019   ? COLONOSCOPY  11/09/2027   ? PNEUMOCOCCAL POLYSACCHARIDE VACCINE AGE 65 AND OVER  Completed   ? PNEUMOCOCCAL CONJUGATE VACCINE FOR ADULTS (PCV13 OR PREVNAR)  Completed

## 2021-06-17 NOTE — TELEPHONE ENCOUNTER
Telephone Encounter by Zahra Mckeon at 4/20/2021  2:11 PM     Author: Zahra Mckeon Service: -- Author Type: Financial Resource Guide    Filed: 4/20/2021  2:26 PM Encounter Date: 4/20/2021 Status: Addendum    : Zahra Mckeon (Financial Resource Guide)    Related Notes: Original Note by Zahra Mckeon (Financial Resource Guide) filed at 4/20/2021  2:25 PM       Prior Authorization Approval  Medication: Praluent 75mg/ml  Qty: 2 pens for 28 days  Effective Dates: 4/20/21 - until further notice  Reference Number: [id number]  Insurance Company: Living Proof  Pharmacy: [facility]   Expected Copay: $466.00  Copay Card Available: not [facility]  Foundation Assistance Needed/Available: not elg, pt's income well over income guidelines  Patient Assistance Program Needed: No  Patient Notified?   Pharmacy Notified? Yes

## 2021-06-18 NOTE — PATIENT INSTRUCTIONS - HE
Patient Instructions by Calixto Mancilla MD at 7/24/2020  9:20 AM     Author: Calixto Mancilla MD Service: -- Author Type: Physician    Filed: 7/24/2020  5:10 PM Encounter Date: 7/24/2020 Status: Signed    : Calixto Mancilla MD (Physician)         Patient Education   Understanding USDA MyPlate  The USDA (US Department of Agriculture) has guidelines to help you make healthy food choices. These are called MyPlate. MyPlate shows the food groups that make up healthy meals using the image of a place setting. Before you eat, think about the healthiest choices for what to put onto your plate or into your cup or bowl. To learn more about building a healthy plate, visit www.choosemyplate.gov.       The Food Groups    Fruits: Any fruit or 100% fruit juice counts as part of the Fruit Group. Fruits may be fresh, canned, frozen, or dried, and may be whole, cut-up, or pureed. Make half your plate fruits and vegetables.    Vegetables: Any vegetable or 100% vegetable juice counts as a member of the Vegetable Group. Vegetables may be fresh, frozen, canned, or dried. They can be served raw or cooked and may be whole, cut-up, or mashed. Make half your plate fruits and vegetables.     Grains: All foods made from grains are part of the Grains Group. These include wheat, rice, oats, cornmeal, and barley such as bread, pasta, oatmeal, cereal, tortillas, and grits. Grains should be no more than a quarter of your plate. At least half of your grains should be whole grains.    Protein: This group includes meat, poultry, seafood, beans and peas, eggs, processed soy products (like tofu), nuts (including nut butters), and seeds. Make protein choices no more than a quarter of your plate. Meat and poultry choices should be lean or low fat.    Dairy: All fluid milk products and foods made from milk that contain calcium, like yogurt and cheese are part of the Dairy Group. (Foods that have little calcium, such as cream, butter,  and cream cheese, are not part of the group.) Most dairy choices should be low-fat or fat-free.    Oils: These are fats that are liquid at room temperature. They include canola, corn, olive, soybean, and sunflower oil. Foods that are mainly oil include mayonnaise, certain salad dressings, and soft margarines. You should have only 5 to 7 teaspoons of oils a day. You probably already get this much from the food you eat.  Use [employer] to Help Build Your Meals  The GetThiscker can help you plan and track your meals and activity. You can look up individual foods to see or compare their nutritional value. You can get guidelines for what and how much you should eat. You can compare your food choices. And you can assess personal physical activities and see ways you can improve. Go to www.sciencebite.gov/Labtripcker/.    5769-6635 The [employer]. [address] [place] [address]. All rights reserved. This information is not intended as a substitute for professional medical care. Always follow your healthcare professional's instructions.           Patient Education   Understanding Advance Care Planning  Advance care planning is the process of deciding ones own future medical care. It helps ensure that if you cant speak for yourself, your wishes can still be carried out. The plan is a series of legal documents that note a persons wishes. The documents vary by state. Advance care planning may be done when a person has a serious illness that is expected to get worse. It may be done before major surgery. And it can help you and your family be prepared in case of a major illness or injury. Advance care planning helps with making decisions at these times.       A health care proxy is a person who acts as the voice of a patient when the patient cant speak for himself or herself. The name of this role varies by state. It may be called a Durable Medical Power of  or Durable Power of  for  Healthcare. It may be called an agent, surrogate, or advocate. Or it may be called a representative or decision maker. It is an official duty that is identified by a legal document. The document also varies by state.    Why Is Advance Care Planning Important?  If a person communicates their healthcare wishes:    They will be given medical care that matches their values and goals.    Their family members will not be forced to make decisions in a crisis with no guidance.  Creating a Plan  Making an advance care plan is often done in 3 steps:    Thinking about ones wishes. To create an advance care plan, you should think about what kind of medical treatment you would want if you lose the ability to communicate. Are there any situations in which you would refuse or stop treatment? Are there therapies you would want or not want? And whom do you want to make decisions for you? There are many places to learn more about how to plan for your care. Ask your doctor or  for resources.    Picking a health care proxy. This means choosing a trusted person to speak for you only when you cant speak for yourself. When you cannot make medical decisions, your proxy makes sure the instructions in your advance care plan are followed. A proxy does not make decisions based on his or her own opinions. They must put aside those opinions and values if needed, and carry out your wishes.    Filling out the legal documents. There are several kinds of legal documents for advance care planning. Each one tells health care providers your wishes. The documents may vary by state. They must be signed and may need to be witnessed or notarized. You can cancel or change them whenever you wish. Depending on your state, the documents may include a Healthcare Proxy form, Living Will, Durable Medical Power of , Advance Directive, or others.  The Familys Role  The best help a family can give is to support their loved ones wishes. Open and  honest communication is vital. Family should express any concerns they have about the patients choices while the patient can still make decisions.    8639-8186 The [employer]. [address] [place] [address], [place] [address]. All rights reserved. This information is not intended as a substitute for professional medical care. Always follow your healthcare professional's instructions.         Also, [facility] offers a free, downloadable health care directive that allows you to share your treatment choices and personal preferences if you cannot communicate your wishes. It also allows you to appoint another person (called a health care agent) to make health care decisions if you are unable to do so. You can download an advance directive by going here: [email or web address]     Patient Education   Personalized Prevention Plan  You are due for the preventive services outlined below.  Your care team is available to assist you in scheduling these services.  If you have already completed any of these items, please share that information with your care team to update in your medical record.  Health Maintenance   Topic Date Due   ? ZOSTER VACCINES (1 of 2) 03/06/1995   ? TD 18+ HE  09/23/2019   ? INFLUENZA VACCINE RULE BASED (1) 08/01/2020   ? MEDICARE ANNUAL WELLNESS VISIT  07/24/2021   ? FALL RISK ASSESSMENT  07/24/2021   ? LIPID  04/22/2024   ? ADVANCE CARE PLANNING  04/22/2024   ? COLORECTAL CANCER SCREENING  11/09/2027   ? PNEUMOCOCCAL IMMUNIZATION 65+ LOW/MEDIUM RISK  Completed   ? HEPATITIS B VACCINES  Aged Out   ? HEPATITIS C SCREENING  Discontinued

## 2021-06-18 NOTE — PATIENT INSTRUCTIONS - HE
Patient Instructions by Jevon Wright DO at 9/21/2020  1:30 PM     Author: Jevon Wright DO Service: -- Author Type: Physician    Filed: 9/21/2020  2:25 PM Encounter Date: 9/21/2020 Status: Addendum    : Jevon Wright DO (Physician)    Related Notes: Original Note by Jevon Wright DO (Physician) filed at 9/21/2020  2:25 PM       It was a pleasure to meet with you today in clinic.  Please do not hesitate to call the [facility] clinic with any questions or concerns at [phone].    Additional Provider Instructions:   1. Start Zetia 10 mg daily  2. Check cholesterol level in 3 months.   3. If LDL remains elevated >70 will consider Repatha       Test Results:   You should receive a phone call from this office informing you of test or procedure results within 3 business days of the test being performed.  If you do not hear from our office with the test results within 1 week please do not hesitate to call asking for these results.    Sincerely,

## 2021-06-19 NOTE — LETTER
Letter by Smith Cota MD at      Author: Smith Cota MD Service: -- Author Type: --    Filed:  Encounter Date: 4/23/2019 Status: (Other)         Asael Crump  [address] [place] [address]             April 23, 2019         Dear Mr. Crump,    Below are the results from your recent visit:    Resulted Orders   PSA (Prostatic-Specific Antigen), Annual Screen   Result Value Ref Range    PSA 1.9 0.0 - 6.5 ng/mL    Narrative    Method is Abbott Prostate-Specific Antigen (PSA)  Standard-WHO 1st International (90:10)   HM2(CBC w/o Differential)   Result Value Ref Range    WBC 6.2 4.0 - 11.0 thou/uL    RBC 5.01 4.40 - 6.20 mill/uL    Hemoglobin 15.1 14.0 - 18.0 g/dL    Hematocrit 43.2 40.0 - 54.0 %    MCV 86 80 - 100 fL    MCH 30.1 27.0 - 34.0 pg    MCHC 34.8 32.0 - 36.0 g/dL    RDW 12.2 11.0 - 14.5 %    Platelets 165 140 - 440 thou/uL    MPV 7.7 7.0 - 10.0 fL   Comprehensive Metabolic Panel   Result Value Ref Range    Sodium 140 136 - 145 mmol/L    Potassium 5.1 (H) 3.5 - 5.0 mmol/L    Chloride 107 98 - 107 mmol/L    CO2 28 22 - 31 mmol/L    Anion Gap, Calculation 5 5 - 18 mmol/L    Glucose 109 70 - 125 mg/dL    BUN 18 8 - 28 mg/dL    Creatinine 0.91 0.70 - 1.30 mg/dL    GFR MDRD Af Amer >60 >60 mL/min/1.73m2    GFR MDRD Non Af Amer >60 >60 mL/min/1.73m2    Bilirubin, Total 0.9 0.0 - 1.0 mg/dL    Calcium 9.5 8.5 - 10.5 mg/dL    Protein, Total 6.6 6.0 - 8.0 g/dL    Albumin 4.1 3.5 - 5.0 g/dL    Alkaline Phosphatase 57 45 - 120 U/L    AST 16 0 - 40 U/L    ALT 13 0 - 45 U/L    Narrative    Fasting Glucose reference range is 70-99 mg/dL per  American Diabetes Association (ADA) guidelines.   Lipid Cascade   Result Value Ref Range    Cholesterol 98 <=199 mg/dL    Triglycerides 93 <=149 mg/dL    HDL Cholesterol 36 (L) >=40 mg/dL    LDL Calculated 43 <=129 mg/dL    Patient Fasting > 8hrs? Yes        All labs look excellent    Please call with questions or contact us using  [employer].    Sincerely,        Electronically signed by Smith Cota MD

## 2021-06-21 NOTE — LETTER
Letter by Jevon Wright DO at      Author: Jevon Wright DO Service: -- Author Type: --    Filed:  Encounter Date: 5/20/2021 Status: (Other)         Asael CALDWELL Theron  [address] [place] [address]      May 20, 2021      Dear Asael,    This letter is to remind you that you are due for your follow up appointment with Dr. Jevon Wright . To help ensure you are in the best health possible, a regular follow-up with your cardiologist is essential.     Please call our Patient Scheduling Line at [phone] to schedule your appointment at your earliest convenience.  If you have recently scheduled an appointment, please disregard this letter.    We look forward to seeing you again. As always, we are available at the number  above for any questions or concerns you may have.      Sincerely,     The Physicians and Staff of [facility]

## 2021-06-24 ENCOUNTER — COMMUNICATION - HEALTHEAST (OUTPATIENT)
Dept: INTERNAL MEDICINE | Facility: CLINIC | Age: 76
End: 2021-06-24

## 2021-06-24 DIAGNOSIS — I25.10 ATHEROSCLEROSIS OF NATIVE CORONARY ARTERY OF NATIVE HEART WITHOUT ANGINA PECTORIS: ICD-10-CM

## 2021-06-25 RX ORDER — ROSUVASTATIN CALCIUM 10 MG/1
10 TABLET, COATED ORAL DAILY
Qty: 90 TABLET | Refills: 2 | Status: SHIPPED | OUTPATIENT
Start: 2021-06-25 | End: 2021-08-16

## 2021-06-29 NOTE — PROGRESS NOTES
Progress Notes by Jevon Wright DO at 9/21/2020  1:30 PM     Author: Jevon Wright DO Service: -- Author Type: Physician    Filed: 9/21/2020  5:25 PM Encounter Date: 9/21/2020 Status: Signed    : Jevon Wright DO (Physician)         Thank you, Dr. Mancilla, for asking the [facility] team to see Mr. Asael Crump to evaluate statin induced myalgias.      Assessment/Recommendations   Assessment:    1. Coronary Artery Disease, MI, stent 2001 (RCA, [facility])  2. Hyperlipidemia, LDL goal <70.    3. Statin induced myalgia (simvastatin and rosuvastatin)    Plan:  1.  Recommend starting Zetia 10 mg daily.  2.  Discontinue rosuvastatin given he cannot tolerate statin therapy.  He has tried very low-dose and resulting in significant myalgias.  He has attempted simvastatin which resulted in severe myalgias.  He attempted low-dose rosuvastatin which again resulted in myalgias.  He rechallenged himself with a very low rosuvastatin again significant myalgias.  Currently his LDLs above his goal of 70.  3.  Discussed PCSK9 inhibitors.  Given information about Repatha.  If his LDL remains above 70 would start Repatha every 2 weeks.  He will be rechecking his LDL in 3 months which I have already ordered the test.       History of Present Illness/Subjective    Mr. Asael Crump is a 75 y.o. male with Lee artery disease status post RCA intervention in 2001 in the setting of acute myocardial infarction, LDL, statin induced myalgias who presents to cardiology clinic in consultation for lipid management and coronary artery disease management.    According to the patient since 2001 he has been doing quite well from a coronary artery standpoint.  He currently denies any active chest pain with exertion, dyspnea with exertion, orthopnea or PND symptoms.  No hospitalizations for heart failure in the past.  He underwent stenting of his right coronary artery at [facility] in  2001.  At that time he was started on simvastatin approximately a year ago he developed shoulder muscle pain along with aching pains in his upper thighs.  At that time he was taken off of simvastatin which resulted in resolution of his arm pain and thigh pain.  He was retried on rosuvastatin at 5 mg daily which resulted in return of pain.  Recently he again attempted a low-dose rosuvastatin and again developed pains in his bilateral thighs and arms within 1 dose.  CK is normal.  He is currently not on Zetia therapy.    He continues to be active including exercising multiple times per week up to 45 minutes.  He has no chest pain with exercise.  He denies any syncope or near syncopal episodes.  Denies any palpitations.    He is compliant with all of his medications.    Discussed in detail with the patient regarding Repatha therapy.  Discussed that this is an injectable.  Gave him information about the drug.  Discussed preauthorization process for the drug.  Also discussed cost.  Cussed in detail side effects of medication.  Discussed liver testing with medication         Physical Examination Review of Systems   Vitals:    09/21/20 1337   BP: 120/76   Pulse: 64   Resp: 14     Body mass index is 25.38 kg/m .  Wt Readings from Last 3 Encounters:   09/21/20 199 lb (90.3 kg)   07/24/20 197 lb (89.4 kg)   05/14/20 198 lb 12.8 oz (90.2 kg)     [unfilled]  General Appearance:   no distress, normal body habitus   ENT/Mouth: membranes moist, no oral lesions or bleeding gums.      EYES:  no scleral icterus, normal conjunctivae   Neck: no carotid bruits or thyromegaly   Chest/Lungs:   lungs are clear to auscultation, no rales or wheezing, no sternal scar, equal chest wall expansion    Cardiovascular:   Regular. Normal first and second heart sounds with no murmurs, rubs, or gallops; the carotid, radial and posterior tibial pulses are intact, Jugular venous pressure normal, no pitting edema bilaterally    Abdomen:  no organomegaly,  masses, bruits, or tenderness; bowel sounds are present   Extremities: no cyanosis or clubbing   Skin: no xanthelasma, warm.    Neurologic: normal  bilateral, no tremors     Psychiatric: alert and oriented x3, calm     General: WNL  Eyes: WNL  Ears/Nose/Throat: WNL  Lungs: WNL  Heart: WNL  Stomach: WNL  Bladder: WNL  Muscle/Joints: Muscle Pain  Skin: WNL  Nervous System: WNL  Mental Health: WNL     Blood: WNL     Medical History  Surgical History Family History Social History   Past Medical History:   Diagnosis Date   ? Allergic rhinitis     Created by Conversion  Replacement Utility updated for latest IMO load   ? BPH with urinary obstruction     Created by Conversion    ? CAD, MI  with stent     Created by Conversion  Replacement Utility updated for latest IMO load   ? Hyperlipemia, mixed     Created by Conversion    ? Lichen Planus - oral     Created by Conversion     Past Surgical History:   Procedure Laterality Date   ? CORONARY STENT PLACEMENT     ? NH HALLUX RIGIDUS W/CHEILECTOMY 1ST MP JT W/O IMPLT Bilateral 5/15/2015    Procedure: CHEILECTOMY BOTH FEET;  Surgeon: Ramon Corey DPM;  Location: [facility] OR;  Service: Podiatry    Family History   Problem Relation Age of Onset   ? Heart attack Father 67   ? COPD Father    ? Dementia Mother          97   ? Brain cancer Sister     Social History     Socioeconomic History   ? Marital status:      Spouse name: Not on file   ? Number of children: Not on file   ? Years of education: Not on file   ? Highest education level: Not on file   Occupational History   ? Not on file   Social Needs   ? Financial resource strain: Not on file   ? Food insecurity     Worry: Not on file     Inability: Not on file   ? Transportation needs     Medical: Not on file     Non-medical: Not on file   Tobacco Use   ? Smoking status: Former Smoker     Last attempt to quit: 2000     Years since quittin.7   ? Smokeless tobacco: Never Used   Substance  and Sexual Activity   ? Alcohol use: Yes     Comment: occasional   ? Drug use: No   ? Sexual activity: Not on file   Lifestyle   ? Physical activity     Days per week: Not on file     Minutes per session: Not on file   ? Stress: Not on file   Relationships   ? Social connections     Talks on phone: Not on file     Gets together: Not on file     Attends Mu-ism service: Not on file     Active member of club or organization: Not on file     Attends meetings of clubs or organizations: Not on file     Relationship status: Not on file   ? Intimate partner violence     Fear of current or ex partner: Not on file     Emotionally abused: Not on file     Physically abused: Not on file     Forced sexual activity: Not on file   Other Topics Concern   ? Not on file   Social History Narrative    Lives with wife, Christen.  Retired , [employer].  Dabbles in "[employer].  Two step daughters (Leia and Soledad), granddaughter and great-granddaughter.          Medications  Allergies   Current Outpatient Medications   Medication Sig Dispense Refill   ? aspirin 81 MG EC tablet Take 81 mg by mouth daily.     ? atenoloL (TENORMIN) 25 MG tablet Take 1 tablet (25 mg total) by mouth daily. 90 tablet 3   ? cholecalciferol, vitamin D3, (VITAMIN D3) 2,000 unit cap Take by mouth daily.     ? diclofenac sodium (VOLTAREN) 1 % Gel Apply 2-4 g topically 4 (four) times a day. 2g to small joints (hands) and 4g to large joints (knees) 500 g 5   ? ketoconazole (NIZORAL) 2 % cream ONE APPLICATION TWICE A DAY TOPICALLY APPLY TO GROIN FOR 3 WEEKS     ? rosuvastatin (CRESTOR) 10 MG tablet Take 0.5 tablets (5 mg total) by mouth daily. 90 tablet 3     No current facility-administered medications for this visit.     Allergies   Allergen Reactions   ? Other Environmental Allergy      Tree pollen, dust, mold -- sneezing, itchy eyes           Lab Results    Chemistry/lipid CBC Cardiac Enzymes/BNP/TSH/INR   Lab Results   Component Value Date    CHOL  182 07/24/2020    HDL 41 07/24/2020    LDLCALC 112 07/24/2020    TRIG 146 07/24/2020    CREATININE 0.96 07/24/2020    BUN 19 07/24/2020    K 4.6 07/24/2020     07/24/2020     07/24/2020    CO2 27 07/24/2020    Lab Results   Component Value Date    WBC 7.4 07/24/2020    HGB 15.3 07/24/2020    HCT 45.1 07/24/2020    MCV 88 07/24/2020     07/24/2020    Lab Results   Component Value Date    CKTOTAL 35 05/08/2020    TROPONINI <0.01 09/20/2011    TSH 1.14 05/08/2020    INR 1.04 09/20/2011

## 2021-07-07 NOTE — TELEPHONE ENCOUNTER
Refill Approved    Rx renewed per Medication Renewal Policy. Medication was last renewed on 2/12/21, last OV 2/12/21.    Nunu Luo, [facility] Triage/Med Refill 6/25/2021     Requested Prescriptions   Pending Prescriptions Disp Refills     rosuvastatin (CRESTOR) 10 MG tablet [Pharmacy Med Name: Rosuvastatin Calcium 10 MG Oral Tablet] 90 tablet 0     Sig: Take 1 tablet by mouth once daily       Statins Refill Protocol (Hmg CoA Reductase Inhibitors) Passed - 6/24/2021  5:08 PM        Passed - PCP or prescribing provider visit in past 12 months      Last office visit with prescriber/PCP: 2/12/2021 Calixto Mancilla MD OR same dept: Visit date not found OR same specialty: 2/12/2021 Calixto Mancilla MD  Last physical: 7/24/2020 Last MTM visit: Visit date not found   Next visit within 3 mo: Visit date not found  Next physical within 3 mo: Visit date not found  Prescriber OR PCP: Calixto Mancilla MD  Last diagnosis associated with med order: 1. CAD, MI 2002 with stent  - rosuvastatin (CRESTOR) 10 MG tablet [Pharmacy Med Name: Rosuvastatin Calcium 10 MG Oral Tablet]; Take 1 tablet by mouth once daily  Dispense: 90 tablet; Refill: 0    If protocol passes may refill for 12 months if within 3 months of last provider visit (or a total of 15 months).

## 2021-07-29 DIAGNOSIS — I25.10 ATHEROSCLEROSIS OF NATIVE CORONARY ARTERY OF NATIVE HEART WITHOUT ANGINA PECTORIS: ICD-10-CM

## 2021-07-29 RX ORDER — ATENOLOL 25 MG/1
25 TABLET ORAL DAILY
Qty: 90 TABLET | Refills: 3 | Status: SHIPPED | OUTPATIENT
Start: 2021-07-29 | End: 2021-11-03

## 2021-07-29 NOTE — TELEPHONE ENCOUNTER
Reason for Call:  Medication or medication refill:    Do you use a [facility]?  Name of the pharmacy and phone number for the current request:  [facility] [address] [place] [address]    Name of the medication requested: atenoloL (TENORMIN) 25 MG tablet    Other request: Pt is calling the clinic because he is on his last pill. The pharmacy told the patient that it is faster to call the clinic then calling them for the refill request. Educated wife that they should call the pharmacy 3-4 days before they are out the medication to get it refilled.     Can we leave a detailed message on this number? YES    Phone number patient can be reached at: Home number on file [phone] (home)    Best Time:     Call taken on 7/29/2021 at 1:56 PM by Destiny Doyle

## 2021-08-16 ENCOUNTER — OFFICE VISIT (OUTPATIENT)
Dept: INTERNAL MEDICINE | Facility: CLINIC | Age: 76
End: 2021-08-16
Payer: MEDICARE

## 2021-08-16 ENCOUNTER — ANCILLARY PROCEDURE (OUTPATIENT)
Dept: GENERAL RADIOLOGY | Facility: CLINIC | Age: 76
End: 2021-08-16
Attending: INTERNAL MEDICINE
Payer: MEDICARE

## 2021-08-16 ENCOUNTER — TRANSFERRED RECORDS (OUTPATIENT)
Dept: HEALTH INFORMATION MANAGEMENT | Facility: CLINIC | Age: 76
End: 2021-08-16

## 2021-08-16 VITALS
HEIGHT: 74 IN | OXYGEN SATURATION: 98 % | SYSTOLIC BLOOD PRESSURE: 116 MMHG | DIASTOLIC BLOOD PRESSURE: 68 MMHG | HEART RATE: 54 BPM | BODY MASS INDEX: 24.63 KG/M2 | WEIGHT: 191.9 LBS

## 2021-08-16 DIAGNOSIS — R05.9 COUGH: ICD-10-CM

## 2021-08-16 DIAGNOSIS — N40.1 BPH WITH URINARY OBSTRUCTION: ICD-10-CM

## 2021-08-16 DIAGNOSIS — Z12.5 SCREENING FOR PROSTATE CANCER: ICD-10-CM

## 2021-08-16 DIAGNOSIS — L43.9 LICHEN PLANUS: ICD-10-CM

## 2021-08-16 DIAGNOSIS — N13.8 BPH WITH URINARY OBSTRUCTION: ICD-10-CM

## 2021-08-16 DIAGNOSIS — Z00.00 ANNUAL PHYSICAL EXAM: Primary | ICD-10-CM

## 2021-08-16 DIAGNOSIS — E78.2 HYPERLIPEMIA, MIXED: ICD-10-CM

## 2021-08-16 DIAGNOSIS — I25.10 ATHEROSCLEROSIS OF NATIVE CORONARY ARTERY OF NATIVE HEART WITHOUT ANGINA PECTORIS: ICD-10-CM

## 2021-08-16 LAB
ALBUMIN SERPL-MCNC: 4 G/DL (ref 3.5–5)
ALBUMIN UR-MCNC: NEGATIVE MG/DL
ALP SERPL-CCNC: 63 U/L (ref 45–120)
ALT SERPL W P-5'-P-CCNC: 11 U/L (ref 0–45)
ANION GAP SERPL CALCULATED.3IONS-SCNC: 7 MMOL/L (ref 5–18)
APPEARANCE UR: CLEAR
AST SERPL W P-5'-P-CCNC: 15 U/L (ref 0–40)
BACTERIA #/AREA URNS HPF: NORMAL /HPF
BILIRUB SERPL-MCNC: 1.3 MG/DL (ref 0–1)
BILIRUB UR QL STRIP: NEGATIVE
BUN SERPL-MCNC: 18 MG/DL (ref 8–28)
CALCIUM SERPL-MCNC: 9.2 MG/DL (ref 8.5–10.5)
CHLORIDE BLD-SCNC: 105 MMOL/L (ref 98–107)
CHOLEST SERPL-MCNC: 94 MG/DL
CO2 SERPL-SCNC: 28 MMOL/L (ref 22–31)
COLOR UR AUTO: YELLOW
CREAT SERPL-MCNC: 0.93 MG/DL (ref 0.7–1.3)
ERYTHROCYTE [DISTWIDTH] IN BLOOD BY AUTOMATED COUNT: 13 % (ref 10–15)
FASTING STATUS PATIENT QL REPORTED: YES
GFR SERPL CREATININE-BSD FRML MDRD: 79 ML/MIN/1.73M2
GLUCOSE BLD-MCNC: 109 MG/DL (ref 70–125)
GLUCOSE UR STRIP-MCNC: NEGATIVE MG/DL
HCT VFR BLD AUTO: 42.8 % (ref 40–53)
HDLC SERPL-MCNC: 36 MG/DL
HGB BLD-MCNC: 14.3 G/DL (ref 13.3–17.7)
HGB UR QL STRIP: ABNORMAL
KETONES UR STRIP-MCNC: NEGATIVE MG/DL
LDLC SERPL CALC-MCNC: 41 MG/DL
LEUKOCYTE ESTERASE UR QL STRIP: NEGATIVE
MCH RBC QN AUTO: 29 PG (ref 26.5–33)
MCHC RBC AUTO-ENTMCNC: 33.4 G/DL (ref 31.5–36.5)
MCV RBC AUTO: 87 FL (ref 78–100)
NITRATE UR QL: NEGATIVE
PH UR STRIP: 5.5 [PH] (ref 5–8)
PLATELET # BLD AUTO: 168 10E3/UL (ref 150–450)
POTASSIUM BLD-SCNC: 4.4 MMOL/L (ref 3.5–5)
PROT SERPL-MCNC: 6.4 G/DL (ref 6–8)
PSA SERPL-MCNC: 2.27 UG/L (ref 0–6.5)
RBC # BLD AUTO: 4.93 10E6/UL (ref 4.4–5.9)
RBC #/AREA URNS AUTO: NORMAL /HPF
SODIUM SERPL-SCNC: 140 MMOL/L (ref 136–145)
SP GR UR STRIP: >=1.03 (ref 1–1.03)
SQUAMOUS #/AREA URNS AUTO: NORMAL /LPF
TRIGL SERPL-MCNC: 83 MG/DL
UROBILINOGEN UR STRIP-ACNC: 0.2 E.U./DL
WBC # BLD AUTO: 7.2 10E3/UL (ref 4–11)
WBC #/AREA URNS AUTO: NORMAL /HPF

## 2021-08-16 PROCEDURE — 81001 URINALYSIS AUTO W/SCOPE: CPT | Performed by: INTERNAL MEDICINE

## 2021-08-16 PROCEDURE — 80053 COMPREHEN METABOLIC PANEL: CPT | Performed by: INTERNAL MEDICINE

## 2021-08-16 PROCEDURE — G0439 PPPS, SUBSEQ VISIT: HCPCS | Performed by: INTERNAL MEDICINE

## 2021-08-16 PROCEDURE — 71046 X-RAY EXAM CHEST 2 VIEWS: CPT | Mod: TC | Performed by: RADIOLOGY

## 2021-08-16 PROCEDURE — 99214 OFFICE O/P EST MOD 30 MIN: CPT | Performed by: INTERNAL MEDICINE

## 2021-08-16 PROCEDURE — 36415 COLL VENOUS BLD VENIPUNCTURE: CPT | Performed by: INTERNAL MEDICINE

## 2021-08-16 PROCEDURE — 80061 LIPID PANEL: CPT | Performed by: INTERNAL MEDICINE

## 2021-08-16 PROCEDURE — 85027 COMPLETE CBC AUTOMATED: CPT | Performed by: INTERNAL MEDICINE

## 2021-08-16 PROCEDURE — G0103 PSA SCREENING: HCPCS | Performed by: INTERNAL MEDICINE

## 2021-08-16 ASSESSMENT — MIFFLIN-ST. JEOR: SCORE: 1670.2

## 2021-08-16 ASSESSMENT — ACTIVITIES OF DAILY LIVING (ADL): CURRENT_FUNCTION: NO ASSISTANCE NEEDED

## 2021-08-16 NOTE — PROGRESS NOTES
"SUBJECTIVE:   Asael Crump is a 76 year old male who presents for Preventive Visit.  This 76-year-old man comes in for annual wellness visit and follow-up of medical problems.  Overall he is doing well.  His wife has metastatic breast cancer and he does caregiving.  History of coronary artery disease which has been quiesced sent.  Generally is tolerating statin.  Has some BPH which is generally stable.  Right cough when he first wakes up in the morning.  History of smoking not currently.    Patient has been advised of split billing requirements and indicates understanding: Yes   Are you in the first 12 months of your Medicare coverage?  No    Healthy Habits:     In general, how would you rate your overall health?  Excellent    Frequency of exercise:  None    Do you usually eat at least 4 servings of fruit and vegetables a day, include whole grains    & fiber and avoid regularly eating high fat or \"junk\" foods?  No    Taking medications regularly:  Yes    Medication side effects:  None    Ability to successfully perform activities of daily living:  No assistance needed    Home Safety:  No safety concerns identified    Hearing Impairment:  No hearing concerns    In the past 6 months, have you been bothered by leaking of urine?  No    In general, how would you rate your overall mental or emotional health?  Good      PHQ-2 Total Score: 0    Additional concerns today:  No    Do you feel safe in your environment? Yes    Have you ever done Advance Care Planning? (For example, a Health Directive, POLST, or a discussion with a medical provider or your loved ones about your wishes): No, advance care planning information given to patient to review.  Patient declined advance care planning discussion at this time.       Fall risk  Fallen 2 or more times in the past year?: No  Any fall with injury in the past year?: No    Cognitive Screening   1) Repeat 3 items (Leader, Season, Table)    2) Clock draw: NORMAL  3) 3 item " "recall: Recalls 3 objects  Results: 3 items recalled: COGNITIVE IMPAIRMENT LESS LIKELY    Mini-CogTM Copyright [place]. Licensed by the author for use in [facility]; reprinted with permission (mini@.[facility]). All rights reserved.      Do you have sleep apnea, excessive snoring or daytime drowsiness?: yes    Reviewed and updated as needed this visit by clinical staff  Tobacco  Allergies  Meds              Reviewed and updated as needed this visit by Provider                Social History     Tobacco Use     Smoking status: Former Smoker     Quit date: 2000     Years since quittin.6     Smokeless tobacco: Never Used   Substance Use Topics     Alcohol use: Yes     Comment: Alcoholic Drinks/day: occasional         Alcohol Use 2021   Prescreen: >3 drinks/day or >7 drinks/week? No               Current providers sharing in care for this patient include:   Patient Care Team:  Calixto Mancilla MD as PCP - General (Internal Medicine)  Jevon Wright DO as Assigned Heart and Vascular Provider  Ramon Corey DPM as Assigned Musculoskeletal Provider  Calixto Mancilla MD as Assigned PCP    The following health maintenance items are reviewed in Epic and correct as of today:  Health Maintenance Due   Topic Date Due     ANNUAL REVIEW OF HM ORDERS  Never done     ZOSTER IMMUNIZATION (1 of 2) Never done     DTAP/TDAP/TD IMMUNIZATION (1 - Tdap) 2009     MEDICARE ANNUAL WELLNESS VISIT  2021               Review of Systems  Constitutional, HEENT, cardiovascular, pulmonary, GI, , musculoskeletal, neuro, skin, endocrine and psych systems are negative, except as otherwise noted.    OBJECTIVE:   /68 (BP Location: Left arm, Patient Position: Sitting, Cuff Size: Adult Regular)   Pulse 54   Ht 1.88 m (6' 2\")   Wt 87 kg (191 lb 14.4 oz)   SpO2 98%   BMI 24.64 kg/m   Estimated body mass index is 24.64 kg/m  as calculated from the following:    Height as of this encounter: 1.88 " "m (6' 2\").    Weight as of this encounter: 87 kg (191 lb 14.4 oz).  Physical Exam  EYES: Eyelids, conjunctiva, and sclera were normal. Pupils were normal. Cornea, iris, and lens were normal bilaterally.  HEAD, EARS, NOSE, MOUTH, AND THROAT: Head and face were normal. Hearing was normal to voice and the ears were normal to external exam. Nose appearance was normal and there was no discharge. Oropharynx was normal.  NECK: Neck appearance was normal. There were no neck masses and the thyroid was not enlarged.  RESPIRATORY: Breathing pattern was normal and the chest moved symmetrically.  Percussion/auscultatory percussion was normal.  Lung sounds were normal and there were no abnormal sounds.  CARDIOVASCULAR: Heart rate and rhythm were normal.  S1 and S2 were normal and there were no extra sounds or murmurs. Peripheral pulses in arms and legs were normal.  Jugular venous pressure was normal.  There was no peripheral edema.  GASTROINTESTINAL: The abdomen was normal in contour.  Bowel sounds were present.  Percussion detected no organ enlargement or tenderness.  Palpation detected no tenderness, mass, or enlarged organs.   MUSCULOSKELETAL: Skeletal configuration was normal and muscle mass was normal for age. Joint appearance was overall normal.  LYMPHATIC: There were no enlarged nodes.  SKIN/HAIR/NAILS: Skin color was normal.  There were no skin lesions.  Hair and nails were normal.  NEUROLOGIC: The patient was alert and oriented to person, place, time, and circumstance. Speech was normal. Cranial nerves were normal. Motor strength was normal for age. The patient was normally coordinated.  PSYCHIATRIC:  Mood and affect were normal and the patient had normal recent and remote memory. The patient's judgment and insight were normal.    ASSESSMENT / PLAN:   1. Annual physical exam  This is a 76-year-old man with issues as discussed below    2. Screening for prostate cancer  - Prostate Specific Antigen Screen; Future  - " "Prostate Specific Antigen Screen    3. CAD, MI 2002 with stent  Continue secondary prevention  - Comprehensive metabolic panel; Future  - CBC with platelets; Future  - Lipid panel reflex to direct LDL Fasting; Future  - Comprehensive metabolic panel  - CBC with platelets  - Lipid panel reflex to direct LDL Fasting    4. BPH with urinary obstruction  Stable  - UA reflex to Microscopic and Culture; Future  - UA reflex to Microscopic and Culture  - Urine Microscopic    5. Hyperlipemia, mixed  Continue statin    6. Lichen Planus - oral  Stable    7. Cough  Minimal cough in the morning, not on lisinopril.  Evaluate with chest x-ray  - XR Chest 2 Views; Future    Estimated body mass index is 24.64 kg/m  as calculated from the following:    Height as of this encounter: 1.88 m (6' 2\").    Weight as of this encounter: 87 kg (191 lb 14.4 oz).    He reports that he quit smoking about 20 years ago. He has never used smokeless tobacco.      Appropriate preventive services were discussed with this patient, including applicable screening as appropriate for cardiovascular disease, diabetes, osteopenia/osteoporosis, and glaucoma.  As appropriate for age/gender, discussed screening for colorectal cancer, prostate cancer, breast cancer, and cervical cancer. Checklist reviewing preventive services available has been given to the patient.    Reviewed patients plan of care and provided an AVS. The Basic Care Plan (routine screening as documented in Health Maintenance) for Asael meets the Care Plan requirement. This Care Plan has been established and reviewed with the Patient.    Counseling Resources:  ATP IV Guidelines  Pooled Cohorts Equation Calculator  Breast Cancer Risk Calculator  Breast Cancer: Medication to Reduce Risk  FRAX Risk Assessment  ICSI Preventive Guidelines  Dietary Guidelines for Americans, 2010  USDA's MyPlate  ASA Prophylaxis  Lung CA Screening    Calixto Mancilla MD  [facility]" [facility]    Identified Health Risks:

## 2021-09-22 ENCOUNTER — MYC MEDICAL ADVICE (OUTPATIENT)
Dept: INTERNAL MEDICINE | Facility: CLINIC | Age: 76
End: 2021-09-22

## 2021-10-11 ENCOUNTER — HEALTH MAINTENANCE LETTER (OUTPATIENT)
Age: 76
End: 2021-10-11

## 2021-11-03 ENCOUNTER — OFFICE VISIT (OUTPATIENT)
Dept: CARDIOLOGY | Facility: CLINIC | Age: 76
End: 2021-11-03
Payer: MEDICARE

## 2021-11-03 VITALS
DIASTOLIC BLOOD PRESSURE: 58 MMHG | BODY MASS INDEX: 24.87 KG/M2 | HEART RATE: 47 BPM | HEIGHT: 74 IN | RESPIRATION RATE: 16 BRPM | WEIGHT: 193.8 LBS | SYSTOLIC BLOOD PRESSURE: 100 MMHG

## 2021-11-03 DIAGNOSIS — E78.2 HYPERLIPEMIA, MIXED: ICD-10-CM

## 2021-11-03 DIAGNOSIS — R00.1 SINUS BRADYCARDIA: ICD-10-CM

## 2021-11-03 DIAGNOSIS — I25.10 ATHEROSCLEROSIS OF NATIVE CORONARY ARTERY OF NATIVE HEART WITHOUT ANGINA PECTORIS: Primary | ICD-10-CM

## 2021-11-03 PROCEDURE — 99214 OFFICE O/P EST MOD 30 MIN: CPT | Performed by: INTERNAL MEDICINE

## 2021-11-03 RX ORDER — ATENOLOL 25 MG/1
25 TABLET ORAL DAILY
Qty: 45 TABLET | Refills: 3 | COMMUNITY
Start: 2021-11-03 | End: 2022-07-23

## 2021-11-03 ASSESSMENT — MIFFLIN-ST. JEOR: SCORE: 1678.82

## 2021-11-03 NOTE — PROGRESS NOTES
"  HEART CARE ENCOUNTER CONSULTATON NOTE      [facility]  [phone]      Assessment/Recommendations   Assessment:    1. Coronary Artery Disease, MI, stent 2001 ([facility])  2. Hyperlipidemia, LDL goal <70.    LDL Cholesterol Calculated   Date Value Ref Range Status   08/16/2021 41 <=129 mg/dL Final   3. Statin induced myalgia (simvastatin and rosuvastatin).  Should right should pain is muscleskeletal and improving.   4. Sinus bradycardia, mediation related.      Plan:  1.  Continue rosuvastatin 10 mg daily    2.  Reduce atenolol to 12.5 mg daily.   3.  ASA 81 mg daily.   4.  Remain active.    Follow-up in 12 months.     Today's clinic visit entailed:  32 minutes spent on the date of the encounter doing chart review, history and exam, documentation and further activities per the note  Provider  Link to [facility] Help Grid        History of Present Illness/Subjective    HPI: Asael Crump is a 76 year old male coronary artery disease, hyperlipidemia presents to cardiology clinic in follow-up.    Clinical evaluation patient's had a stable course.  He denies any active chest pain with exertion.  Denies any lightheadedness or syncope.  Denies any dyspnea on exertion.  Other than one episode of lightheadedness with significant exertion he has had no symptoms in the past year.  His LDL is markedly improved with use of Crestor 10 mg daily.  He states that his right shoulder pain which she felt was originally related to myalgias from statins as improved on medication with Crestor and is likely related to shoulder issues that are muscle skeletal related.    He is compliant his medications.  Reviewed most recent outpatient note by Dr. Gamez.    Echocardiogram Results: no recent     Coronary Angiogram Results: no recent       Physical Examination  Review of Systems   Vitals: Ht 1.88 m (6' 2\")   Wt 87.9 kg (193 lb 12.8 oz)   BMI 24.88 kg/m    BMI= Body mass index is 24.88 kg/m .  Wt Readings from " Last 3 Encounters:   11/03/21 87.9 kg (193 lb 12.8 oz)   08/16/21 87 kg (191 lb 14.4 oz)   02/12/21 89.8 kg (198 lb)       General Appearance:   no distress, normal body habitus   ENT/Mouth: membranes moist, no oral lesions or bleeding gums.      EYES:  no scleral icterus, normal conjunctivae   Neck: no carotid bruits or thyromegaly   Chest/Lungs:   lungs are clear to auscultation, no rales or wheezing, no sternal scar, equal chest wall expansion    Cardiovascular:   Regular, heart rate 56. Normal first and second heart sounds with no murmurs, rubs, or gallops; the carotid, radial and posterior tibial pulses are intact, Jugular venous pressure normal, no edema bilaterally    Abdomen:  no organomegaly, masses, bruits, or tenderness; bowel sounds are present   Extremities: no cyanosis or clubbing   Skin: no xanthelasma, warm.    Neurologic: normal  bilateral, no tremors     Psychiatric: alert and oriented x3, calm        Please refer above for cardiac ROS details.        Medical History  Surgical History Family History Social History   Past Medical History:   Diagnosis Date     Allergic rhinitis     Created by Conversion  Replacement Utility updated for latest IMO load     Atherosclerosis of native coronary artery of native heart without angina pectoris     Created by Conversion  Replacement Utility updated for latest IMO load     BPH with urinary obstruction     Created by Conversion      Hyperlipemia, mixed     Created by Conversion      Lichen planus     Created by Conversion      Past Surgical History:   Procedure Laterality Date     CORONARY STENT PLACEMENT  2002     DE HALLUX RIGIDUS W/CHEILECTOMY 1ST MP JT W/O IMPLT Bilateral 5/15/2015    Procedure: CHEILECTOMY BOTH FEET;  Surgeon: Ramon Corey DPM;  Location: [facility];  Service: Podiatry     Family History   Problem Relation Age of Onset     Coronary Artery Disease Father 67.00     Chronic Obstructive Pulmonary Disease Father      Dementia  Mother          97     Brain Cancer Sister         Social History     Socioeconomic History     Marital status:      Spouse name: Not on file     Number of children: Not on file     Years of education: Not on file     Highest education level: Not on file   Occupational History     Not on file   Tobacco Use     Smoking status: Former Smoker     Quit date: 2000     Years since quittin.8     Smokeless tobacco: Never Used   Substance and Sexual Activity     Alcohol use: Yes     Comment: rare     Drug use: No     Sexual activity: Not on file   Other Topics Concern     Not on file   Social History Narrative    Lives with wife, Christen.  Retired , [employer].  Dabbles in [employer].  Two step daughters (Leia and Soledad), granddaughter and great-granddaughter.     Social Determinants of Health     Financial Resource Strain:      Difficulty of Paying Living Expenses:    Food Insecurity:      Worried About Running Out of Food in the Last Year:      Ran Out of Food in the Last Year:    Transportation Needs:      Lack of Transportation (Medical):      Lack of Transportation (Non-Medical):    Physical Activity:      Days of Exercise per Week:      Minutes of Exercise per Session:    Stress:      Feeling of Stress :    Social Connections:      Frequency of Communication with Friends and Family:      Frequency of Social Gatherings with Friends and Family:      Attends [employer] Services:      Active Member of Clubs or Organizations:      Attends Club or Organization Meetings:      Marital Status:    Intimate Partner Violence:      Fear of Current or Ex-Partner:      Emotionally Abused:      Physically Abused:      Sexually Abused:            Medications  Allergies   Current Outpatient Medications   Medication Sig Dispense Refill     aspirin 81 MG EC tablet [ASPIRIN 81 MG EC TABLET] Take 81 mg by mouth daily.       atenolol (TENORMIN) 25 MG tablet Take 1 tablet (25 mg) by mouth daily 90 tablet  3     cholecalciferol, vitamin D3, (VITAMIN D3) 2,000 unit cap [CHOLECALCIFEROL, VITAMIN D3, (VITAMIN D3) 2,000 UNIT CAP] Take by mouth daily.       rosuvastatin (CRESTOR) 10 MG tablet [ROSUVASTATIN (CRESTOR) 10 MG TABLET] Take 1 tablet (10 mg total) by mouth daily.       ketoconazole (NIZORAL) 2 % cream [KETOCONAZOLE (NIZORAL) 2 % CREAM] ONE APPLICATION TWICE A DAY TOPICALLY APPLY TO GROIN FOR 3 WEEKS (Patient not taking: Reported on 11/3/2021)         Allergies   Allergen Reactions     Other Environmental Allergy Unknown     Tree pollen, dust, mold -- sneezing, itchy eyes          Lab Results    Chemistry/lipid CBC Cardiac Enzymes/BNP/TSH/INR   Recent Labs   Lab Test 08/16/21  0748   CHOL 94   HDL 36*   LDL 41   TRIG 83     Recent Labs   Lab Test 08/16/21  0748 07/24/20  1004 04/22/19  0914   LDL 41 112 43     Recent Labs   Lab Test 08/16/21  0748      POTASSIUM 4.4   CHLORIDE 105   CO2 28      BUN 18   CR 0.93   GFRESTIMATED 79   CLARA 9.2     Recent Labs   Lab Test 08/16/21  0748 07/24/20  1004 01/23/20  1122   CR 0.93 0.96 0.85     No results for input(s): A1C in the last 86250 hours.       Recent Labs   Lab Test 08/16/21  0748   WBC 7.2   HGB 14.3   HCT 42.8   MCV 87        Recent Labs   Lab Test 08/16/21  0748 07/24/20  1004 05/08/20  1523   HGB 14.3 15.3 13.9*    No results for input(s): TROPONINI in the last 47544 hours.  No results for input(s): BNP, NTBNPI, NTBNP in the last 74243 hours.  Recent Labs   Lab Test 05/08/20  1523   TSH 1.14     No results for input(s): INR in the last 41461 hours.     Jevon Wright DO

## 2021-11-03 NOTE — LETTER
11/3/2021    Calixto Mancilla MD  [facility] [address] [facility] [address]    RE: Asael Crump       Dear Colleague,    I had the pleasure of seeing Asael Crump in the [facility].    HEART CARE ENCOUNTER CONSULTATON NOTE      [facility]  [phone]      Assessment/Recommendations   Assessment:    1. Coronary Artery Disease, MI, stent 2001 ([facility])  2. Hyperlipidemia, LDL goal <70.    LDL Cholesterol Calculated   Date Value Ref Range Status   08/16/2021 41 <=129 mg/dL Final   3. Statin induced myalgia (simvastatin and rosuvastatin).  Should right should pain is muscleskeletal and improving.   4. Sinus bradycardia, mediation related.      Plan:  1.  Continue rosuvastatin 10 mg daily    2.  Reduce atenolol to 12.5 mg daily.   3.  ASA 81 mg daily.   4.  Remain active.    Follow-up in 12 months.     Today's clinic visit entailed:  32 minutes spent on the date of the encounter doing chart review, history and exam, documentation and further activities per the note  Provider  Link to [facility] Help Grid        History of Present Illness/Subjective    HPI: Asael Crump is a 76 year old male coronary artery disease, hyperlipidemia presents to cardiology clinic in follow-up.    Clinical evaluation patient's had a stable course.  He denies any active chest pain with exertion.  Denies any lightheadedness or syncope.  Denies any dyspnea on exertion.  Other than one episode of lightheadedness with significant exertion he has had no symptoms in the past year.  His LDL is markedly improved with use of Crestor 10 mg daily.  He states that his right shoulder pain which she felt was originally related to myalgias from statins as improved on medication with Crestor and is likely related to shoulder issues that are muscle skeletal related.    He is compliant his medications.  Reviewed most recent outpatient note  "by Dr. Gamez.    Echocardiogram Results: no recent     Coronary Angiogram Results: no recent       Physical Examination  Review of Systems   Vitals: Ht 1.88 m (6' 2\")   Wt 87.9 kg (193 lb 12.8 oz)   BMI 24.88 kg/m    BMI= Body mass index is 24.88 kg/m .  Wt Readings from Last 3 Encounters:   11/03/21 87.9 kg (193 lb 12.8 oz)   08/16/21 87 kg (191 lb 14.4 oz)   02/12/21 89.8 kg (198 lb)       General Appearance:   no distress, normal body habitus   ENT/Mouth: membranes moist, no oral lesions or bleeding gums.      EYES:  no scleral icterus, normal conjunctivae   Neck: no carotid bruits or thyromegaly   Chest/Lungs:   lungs are clear to auscultation, no rales or wheezing, no sternal scar, equal chest wall expansion    Cardiovascular:   Regular, heart rate 56. Normal first and second heart sounds with no murmurs, rubs, or gallops; the carotid, radial and posterior tibial pulses are intact, Jugular venous pressure normal, no edema bilaterally    Abdomen:  no organomegaly, masses, bruits, or tenderness; bowel sounds are present   Extremities: no cyanosis or clubbing   Skin: no xanthelasma, warm.    Neurologic: normal  bilateral, no tremors     Psychiatric: alert and oriented x3, calm        Please refer above for cardiac ROS details.        Medical History  Surgical History Family History Social History   Past Medical History:   Diagnosis Date     Allergic rhinitis     Created by Conversion  Replacement Utility updated for latest IMO load     Atherosclerosis of native coronary artery of native heart without angina pectoris     Created by Conversion  Replacement Utility updated for latest IMO load     BPH with urinary obstruction     Created by Conversion      Hyperlipemia, mixed     Created by Conversion      Lichen planus     Created by Conversion      Past Surgical History:   Procedure Laterality Date     CORONARY STENT PLACEMENT  2002     [place] HALLUX RIGIDUS W/CHEILECTOMY 1ST MP JT W/O IMPLT Bilateral 5/15/2015    " Procedure: CHEILECTOMY BOTH FEET;  Surgeon: Ramon Corey DPM;  Location: [facility];  Service: Podiatry     Family History   Problem Relation Age of Onset     Coronary Artery Disease Father 67.00     Chronic Obstructive Pulmonary Disease Father      Dementia Mother          97     Brain Cancer Sister         Social History     Socioeconomic History     Marital status:      Spouse name: Not on file     Number of children: Not on file     Years of education: Not on file     Highest education level: Not on file   Occupational History     Not on file   Tobacco Use     Smoking status: Former Smoker     Quit date: 2000     Years since quittin.8     Smokeless tobacco: Never Used   Substance and Sexual Activity     Alcohol use: Yes     Comment: rare     Drug use: No     Sexual activity: Not on file   Other Topics Concern     Not on file   Social History Narrative    Lives with wife, Christen.  Retired , [employer].  Dabbles in [employer].  Two step daughters (Leia and Soledad), granddaughter and great-granddaughter.     Social Determinants of Health     Financial Resource Strain:      Difficulty of Paying Living Expenses:    Food Insecurity:      Worried About Running Out of Food in the Last Year:      Ran Out of Food in the Last Year:    Transportation Needs:      Lack of Transportation (Medical):      Lack of Transportation (Non-Medical):    Physical Activity:      Days of Exercise per Week:      Minutes of Exercise per Session:    Stress:      Feeling of Stress :    Social Connections:      Frequency of Communication with Friends and Family:      Frequency of Social Gatherings with Friends and Family:      Attends [employer] Services:      Active Member of Clubs or Organizations:      Attends Club or Organization Meetings:      Marital Status:    Intimate Partner Violence:      Fear of Current or Ex-Partner:      Emotionally Abused:      Physically Abused:      Sexually  Abused:            Medications  Allergies   Current Outpatient Medications   Medication Sig Dispense Refill     aspirin 81 MG EC tablet [ASPIRIN 81 MG EC TABLET] Take 81 mg by mouth daily.       atenolol (TENORMIN) 25 MG tablet Take 1 tablet (25 mg) by mouth daily 90 tablet 3     cholecalciferol, vitamin D3, (VITAMIN D3) 2,000 unit cap [CHOLECALCIFEROL, VITAMIN D3, (VITAMIN D3) 2,000 UNIT CAP] Take by mouth daily.       rosuvastatin (CRESTOR) 10 MG tablet [ROSUVASTATIN (CRESTOR) 10 MG TABLET] Take 1 tablet (10 mg total) by mouth daily.       ketoconazole (NIZORAL) 2 % cream [KETOCONAZOLE (NIZORAL) 2 % CREAM] ONE APPLICATION TWICE A DAY TOPICALLY APPLY TO GROIN FOR 3 WEEKS (Patient not taking: Reported on 11/3/2021)         Allergies   Allergen Reactions     Other Environmental Allergy Unknown     Tree pollen, dust, mold -- sneezing, itchy eyes          Lab Results    Chemistry/lipid CBC Cardiac Enzymes/BNP/TSH/INR   Recent Labs   Lab Test 08/16/21  0748   CHOL 94   HDL 36*   LDL 41   TRIG 83     Recent Labs   Lab Test 08/16/21  0748 07/24/20  1004 04/22/19  0914   LDL 41 112 43     Recent Labs   Lab Test 08/16/21  0748      POTASSIUM 4.4   CHLORIDE 105   CO2 28      BUN 18   CR 0.93   GFRESTIMATED 79   CLARA 9.2     Recent Labs   Lab Test 08/16/21  0748 07/24/20  1004 01/23/20  1122   CR 0.93 0.96 0.85     No results for input(s): A1C in the last 80540 hours.       Recent Labs   Lab Test 08/16/21  0748   WBC 7.2   HGB 14.3   HCT 42.8   MCV 87        Recent Labs   Lab Test 08/16/21  0748 07/24/20  1004 05/08/20  1523   HGB 14.3 15.3 13.9*    No results for input(s): TROPONINI in the last 98620 hours.  No results for input(s): BNP, NTBNPI, NTBNP in the last 97692 hours.  Recent Labs   Lab Test 05/08/20  1523   TSH 1.14     No results for input(s): INR in the last 06035 hours.         Jevon Wright DO  [facility]

## 2022-02-16 ENCOUNTER — OFFICE VISIT (OUTPATIENT)
Dept: INTERNAL MEDICINE | Facility: CLINIC | Age: 77
End: 2022-02-16
Payer: MEDICARE

## 2022-02-16 VITALS
HEART RATE: 56 BPM | BODY MASS INDEX: 25.09 KG/M2 | OXYGEN SATURATION: 99 % | DIASTOLIC BLOOD PRESSURE: 60 MMHG | SYSTOLIC BLOOD PRESSURE: 104 MMHG | WEIGHT: 195.4 LBS

## 2022-02-16 DIAGNOSIS — E78.2 HYPERLIPEMIA, MIXED: ICD-10-CM

## 2022-02-16 DIAGNOSIS — R73.9 HYPERGLYCEMIA: ICD-10-CM

## 2022-02-16 DIAGNOSIS — N13.8 BPH WITH URINARY OBSTRUCTION: ICD-10-CM

## 2022-02-16 DIAGNOSIS — N40.1 BPH WITH URINARY OBSTRUCTION: ICD-10-CM

## 2022-02-16 DIAGNOSIS — I25.10 ATHEROSCLEROSIS OF NATIVE CORONARY ARTERY OF NATIVE HEART WITHOUT ANGINA PECTORIS: Primary | ICD-10-CM

## 2022-02-16 DIAGNOSIS — G62.9 POLYNEUROPATHY: ICD-10-CM

## 2022-02-16 LAB
ALBUMIN SERPL-MCNC: 4 G/DL (ref 3.5–5)
ALBUMIN UR-MCNC: NEGATIVE MG/DL
ALP SERPL-CCNC: 62 U/L (ref 45–120)
ALT SERPL W P-5'-P-CCNC: 17 U/L (ref 0–45)
ANION GAP SERPL CALCULATED.3IONS-SCNC: 8 MMOL/L (ref 5–18)
APPEARANCE UR: CLEAR
AST SERPL W P-5'-P-CCNC: 17 U/L (ref 0–40)
BACTERIA #/AREA URNS HPF: ABNORMAL /HPF
BILIRUB SERPL-MCNC: 1.1 MG/DL (ref 0–1)
BILIRUB UR QL STRIP: NEGATIVE
BUN SERPL-MCNC: 16 MG/DL (ref 8–28)
CALCIUM SERPL-MCNC: 9.3 MG/DL (ref 8.5–10.5)
CHLORIDE BLD-SCNC: 107 MMOL/L (ref 98–107)
CO2 SERPL-SCNC: 26 MMOL/L (ref 22–31)
COLOR UR AUTO: YELLOW
CREAT SERPL-MCNC: 0.87 MG/DL (ref 0.7–1.3)
ERYTHROCYTE [DISTWIDTH] IN BLOOD BY AUTOMATED COUNT: 13.5 % (ref 10–15)
GFR SERPL CREATININE-BSD FRML MDRD: 89 ML/MIN/1.73M2
GLUCOSE BLD-MCNC: 101 MG/DL (ref 70–125)
GLUCOSE UR STRIP-MCNC: NEGATIVE MG/DL
HBA1C MFR BLD: 5.2 % (ref 0–5.6)
HCT VFR BLD AUTO: 44.5 % (ref 40–53)
HGB BLD-MCNC: 14.5 G/DL (ref 13.3–17.7)
HGB UR QL STRIP: ABNORMAL
KETONES UR STRIP-MCNC: NEGATIVE MG/DL
LEUKOCYTE ESTERASE UR QL STRIP: NEGATIVE
MCH RBC QN AUTO: 28.7 PG (ref 26.5–33)
MCHC RBC AUTO-ENTMCNC: 32.6 G/DL (ref 31.5–36.5)
MCV RBC AUTO: 88 FL (ref 78–100)
NITRATE UR QL: NEGATIVE
PH UR STRIP: 5.5 [PH] (ref 5–8)
PLATELET # BLD AUTO: 170 10E3/UL (ref 150–450)
POTASSIUM BLD-SCNC: 4.5 MMOL/L (ref 3.5–5)
PROT SERPL-MCNC: 6.3 G/DL (ref 6–8)
RBC # BLD AUTO: 5.06 10E6/UL (ref 4.4–5.9)
RBC #/AREA URNS AUTO: ABNORMAL /HPF
SODIUM SERPL-SCNC: 141 MMOL/L (ref 136–145)
SP GR UR STRIP: 1.02 (ref 1–1.03)
SQUAMOUS #/AREA URNS AUTO: ABNORMAL /LPF
TSH SERPL DL<=0.005 MIU/L-ACNC: 1.79 UIU/ML (ref 0.3–5)
UROBILINOGEN UR STRIP-ACNC: 0.2 E.U./DL
VIT B12 SERPL-MCNC: 285 PG/ML (ref 213–816)
WBC # BLD AUTO: 7.2 10E3/UL (ref 4–11)
WBC #/AREA URNS AUTO: ABNORMAL /HPF

## 2022-02-16 PROCEDURE — 99214 OFFICE O/P EST MOD 30 MIN: CPT | Performed by: INTERNAL MEDICINE

## 2022-02-16 PROCEDURE — 84165 PROTEIN E-PHORESIS SERUM: CPT | Performed by: PATHOLOGY

## 2022-02-16 PROCEDURE — 85027 COMPLETE CBC AUTOMATED: CPT | Performed by: INTERNAL MEDICINE

## 2022-02-16 PROCEDURE — 83036 HEMOGLOBIN GLYCOSYLATED A1C: CPT | Performed by: INTERNAL MEDICINE

## 2022-02-16 PROCEDURE — 80053 COMPREHEN METABOLIC PANEL: CPT | Performed by: INTERNAL MEDICINE

## 2022-02-16 PROCEDURE — 81001 URINALYSIS AUTO W/SCOPE: CPT | Performed by: INTERNAL MEDICINE

## 2022-02-16 PROCEDURE — 84443 ASSAY THYROID STIM HORMONE: CPT | Performed by: INTERNAL MEDICINE

## 2022-02-16 PROCEDURE — 82607 VITAMIN B-12: CPT | Performed by: INTERNAL MEDICINE

## 2022-02-16 PROCEDURE — 36415 COLL VENOUS BLD VENIPUNCTURE: CPT | Performed by: INTERNAL MEDICINE

## 2022-02-16 RX ORDER — ROSUVASTATIN CALCIUM 10 MG/1
10 TABLET, COATED ORAL DAILY
Qty: 90 TABLET | Refills: 3 | Status: SHIPPED | OUTPATIENT
Start: 2022-02-16 | End: 2023-03-14

## 2022-02-16 RX ORDER — TADALAFIL 5 MG/1
5 TABLET ORAL EVERY 24 HOURS
Qty: 30 TABLET | Refills: 1 | Status: SHIPPED | OUTPATIENT
Start: 2022-02-16 | End: 2022-08-16

## 2022-02-16 NOTE — PROGRESS NOTES
Office Visit - Follow Up   Asael Crump   76 year old male    Date of Visit: 2/16/2022    Chief Complaint   Patient presents with     Follow Up     NEUROPATHY     X 3 MONTHS; FEET BILATERALLY        Assessment and Plan   1. Atherosclerosis of native coronary artery of native heart without angina pectoris  Continue secondary prevention  - rosuvastatin (CRESTOR) 10 MG tablet; Take 1 tablet (10 mg) by mouth daily  Dispense: 90 tablet; Refill: 3  - CBC with platelets; Future  - Comprehensive metabolic panel; Future  - CBC with platelets  - Comprehensive metabolic panel    2. Polyneuropathy  Etiology uncertain, check labs, if normal would recommend EMG  - Vitamin B12; Future  - Protein electrophoresis; Future  - Vitamin B12  - Protein electrophoresis    3. Hyperglycemia  - Hemoglobin A1c; Future  - Hemoglobin A1c    4. Hyperlipemia, mixed  - TSH; Future  - UA with Microscopic reflex to Culture - lab collect; Future  - TSH  - UA with Microscopic reflex to Culture - lab collect  - Urine Microscopic    5. BPH with urinary obstruction  - tadalafil (CIALIS) 5 MG tablet; Take 1 tablet (5 mg) by mouth every 24 hours  Dispense: 30 tablet; Refill: 1    Return in about 6 months (around 8/16/2022) for Routine preventive.     History of Present Illness   This 76 year old man comes in for evaluation of some numbness in his feet.  This has been coming on over the past 3 months.  Normally in the balls of his feet.  Fairly persistent.  Not really painful.  No other new issues.  His joint pains are much better.  Tolerating statin.  Urinary symptoms are a little more prominent with some nocturia.  No chest pain or shortness of breath    Review of Systems: A comprehensive review of systems was negative except as noted.     Medications, Allergies and Problem List   Reviewed, reconciled and updated  Post Discharge Medication Reconciliation Status:      Physical Exam   General Appearance:   No acute distress    /60 (BP Location:  Left arm, Patient Position: Sitting, Cuff Size: Adult Regular)   Pulse 56   Wt 88.6 kg (195 lb 6.4 oz)   SpO2 99%   BMI 25.09 kg/m      Lymphatic no cervical lymphadenopathy  Cardiovascular regular rate and rhythm no murmur gallop or rub  Pulmonary lungs are clear to auscultation bilaterally  Neurologic exam is non focal, sensation generally intact in his feet  Psychiatric pleasant, no confusion or agitation        Additional Information   Current Outpatient Medications   Medication Sig Dispense Refill     aspirin 81 MG EC tablet [ASPIRIN 81 MG EC TABLET] Take 81 mg by mouth daily.       atenolol (TENORMIN) 25 MG tablet Take 25 mg by mouth daily  45 tablet 3     cholecalciferol, vitamin D3, (VITAMIN D3) 2,000 unit cap [CHOLECALCIFEROL, VITAMIN D3, (VITAMIN D3) 2,000 UNIT CAP] Take by mouth daily.       rosuvastatin (CRESTOR) 10 MG tablet Take 1 tablet (10 mg) by mouth daily 90 tablet 3     tadalafil (CIALIS) 5 MG tablet Take 1 tablet (5 mg) by mouth every 24 hours 30 tablet 1     Allergies   Allergen Reactions     Other Environmental Allergy Unknown     Tree pollen, dust, mold -- sneezing, itchy eyes     Social History     Tobacco Use     Smoking status: Former Smoker     Quit date: 2000     Years since quittin.1     Smokeless tobacco: Never Used   Substance Use Topics     Alcohol use: Yes     Comment: rare     Drug use: No       Review and/or order of clinical lab tests:  Review and/or order of radiology tests:  Review and/or order of medicine tests:  Discussion of test results with performing physician:  Decision to obtain old records and/or obtain history from someone other than the patient:  Review and summarization of old records and/or obtaining history from someone other than the patient and.or discussion of case with another health care provider:  Independent visualization of image, tracing or specimen itself:    Time:      Calixto Mancilla MD

## 2022-02-18 LAB
ALBUMIN PERCENT: 71.8 % (ref 51–67)
ALBUMIN SERPL ELPH-MCNC: 4.5 G/DL (ref 3.2–4.7)
ALPHA 1 PERCENT: 2.1 % (ref 2–4)
ALPHA 2 PERCENT: 9.3 % (ref 5–13)
ALPHA1 GLOB SERPL ELPH-MCNC: 0.1 G/DL (ref 0.1–0.3)
ALPHA2 GLOB SERPL ELPH-MCNC: 0.6 G/DL (ref 0.4–0.9)
B-GLOBULIN SERPL ELPH-MCNC: 0.6 G/DL (ref 0.7–1.2)
BETA PERCENT: 9.6 % (ref 10–17)
GAMMA GLOB SERPL ELPH-MCNC: 0.5 G/DL (ref 0.6–1.4)
GAMMA GLOBULIN PERCENT: 7.2 % (ref 9–20)
PATH ICD:: ABNORMAL
PROT PATTERN SERPL ELPH-IMP: ABNORMAL
REVIEWING PATHOLOGIST: ABNORMAL
TOTAL PROTEIN SERUM FOR ELP (SYNCED VALUE): 6.3 G/DL
TOTAL PROTEIN SERUM FOR ELP: 6.3 G/DL (ref 6–8)

## 2022-04-25 ENCOUNTER — OFFICE VISIT (OUTPATIENT)
Dept: INTERNAL MEDICINE | Facility: CLINIC | Age: 77
End: 2022-04-25
Payer: MEDICARE

## 2022-04-25 VITALS
DIASTOLIC BLOOD PRESSURE: 64 MMHG | HEART RATE: 57 BPM | RESPIRATION RATE: 14 BRPM | OXYGEN SATURATION: 99 % | WEIGHT: 190 LBS | BODY MASS INDEX: 24.39 KG/M2 | SYSTOLIC BLOOD PRESSURE: 111 MMHG

## 2022-04-25 DIAGNOSIS — Z20.822 SUSPECTED 2019 NOVEL CORONAVIRUS INFECTION: Primary | ICD-10-CM

## 2022-04-25 LAB — SARS-COV-2 RNA RESP QL NAA+PROBE: NEGATIVE

## 2022-04-25 PROCEDURE — U0005 INFEC AGEN DETEC AMPLI PROBE: HCPCS | Performed by: INTERNAL MEDICINE

## 2022-04-25 PROCEDURE — 99214 OFFICE O/P EST MOD 30 MIN: CPT | Performed by: INTERNAL MEDICINE

## 2022-04-25 PROCEDURE — U0003 INFECTIOUS AGENT DETECTION BY NUCLEIC ACID (DNA OR RNA); SEVERE ACUTE RESPIRATORY SYNDROME CORONAVIRUS 2 (SARS-COV-2) (CORONAVIRUS DISEASE [COVID-19]), AMPLIFIED PROBE TECHNIQUE, MAKING USE OF HIGH THROUGHPUT TECHNOLOGIES AS DESCRIBED BY CMS-2020-01-R: HCPCS | Performed by: INTERNAL MEDICINE

## 2022-04-25 ASSESSMENT — PAIN SCALES - GENERAL: PAINLEVEL: NO PAIN (0)

## 2022-04-25 NOTE — PROGRESS NOTES
Office Visit - Follow Up   Asael Crump   77 year old male    Date of Visit: 4/25/2022    Chief Complaint   Patient presents with     office visit     2 negative home test. Cough since Monday . Exposed to covid +      Recheck Medication        Assessment and Plan   1. Suspected 2019 novel coronavirus infection  At this point would not be eligible for treatment.  Is improving.  Discussed isolation guidelines.  If not improving over the next 5 to 7 days and COVID is negative would recommend antibiotics for sinus infection  - Symptomatic; Yes; 4/18/2022 COVID-19 Virus (Coronavirus) by PCR    Return in about 1 week (around 5/2/2022) for Follow up.     History of Present Illness   This 77 year old man is seen in COVID isolation for symptoms of COVID.  He was exposed on Easter and a day or so later developed cough.  This is been persistent although may be a little bit improved now.  Nasal congestion improving.  No significant fever or chills.  No shortness of breath.  No body aches.  Has had 2 negative COVID tests at home.  He has been vaccinated and boosted    Review of Systems: A comprehensive review of systems was negative except as noted.     Medications, Allergies and Problem List   Reviewed, reconciled and updated  Post Discharge Medication Reconciliation Status:   Social History     Social History Narrative    Lives with wife, Christen.  Retired , [employer].  Dabbles in [employer].  Two step daughters (Leia and Soledad), granddaughter and great-granddaughter.        Physical Exam   General Appearance:   No acute distress    /64 (BP Location: Left arm, Patient Position: Sitting, Cuff Size: Adult Large)   Pulse 57   Resp 14   Wt 86.2 kg (190 lb)   SpO2 99%   BMI 24.39 kg/m      Lungs are clear to auscultation bilaterally cardiovascular regular rate and rhythm,     Additional Information   Current Outpatient Medications   Medication Sig Dispense Refill     aspirin 81 MG EC tablet  [ASPIRIN 81 MG EC TABLET] Take 81 mg by mouth daily.       atenolol (TENORMIN) 25 MG tablet Take 25 mg by mouth daily  45 tablet 3     cholecalciferol, vitamin D3, (VITAMIN D3) 2,000 unit cap [CHOLECALCIFEROL, VITAMIN D3, (VITAMIN D3) 2,000 UNIT CAP] Take by mouth daily.       rosuvastatin (CRESTOR) 10 MG tablet Take 1 tablet (10 mg) by mouth daily 90 tablet 3     tadalafil (CIALIS) 5 MG tablet Take 1 tablet (5 mg) by mouth every 24 hours (Patient not taking: Reported on 2022) 30 tablet 1     Allergies   Allergen Reactions     Other Environmental Allergy Unknown     Tree pollen, dust, mold -- sneezing, itchy eyes     Social History     Tobacco Use     Smoking status: Former Smoker     Quit date: 2000     Years since quittin.3     Smokeless tobacco: Never Used   Substance Use Topics     Alcohol use: Yes     Comment: rare     Drug use: No          Calixto Mancilla MD

## 2022-07-23 DIAGNOSIS — I25.10 ATHEROSCLEROSIS OF NATIVE CORONARY ARTERY OF NATIVE HEART WITHOUT ANGINA PECTORIS: ICD-10-CM

## 2022-07-23 RX ORDER — ATENOLOL 25 MG/1
TABLET ORAL
Qty: 90 TABLET | Refills: 3 | Status: SHIPPED | OUTPATIENT
Start: 2022-07-23 | End: 2023-07-22

## 2022-07-24 NOTE — TELEPHONE ENCOUNTER
"Last Written Prescription Date:  11/3/2021  Last Fill Quantity: 45,  # refills: 3   Last office visit provider:  4/25/2022     Requested Prescriptions   Pending Prescriptions Disp Refills     atenolol (TENORMIN) 25 MG tablet [Pharmacy Med Name: ATENOLOL 25 MG TABLET] 90 tablet      Sig: TAKE 1 TABLET BY MOUTH EVERY DAY       Beta-Blockers Protocol Passed - 7/23/2022  7:16 AM        Passed - Blood pressure under 140/90 in past 12 months     BP Readings from Last 3 Encounters:   04/25/22 111/64   02/16/22 104/60   11/03/21 100/58                 Passed - Patient is age 6 or older        Passed - Recent (12 mo) or future (30 days) visit within the authorizing provider's specialty     Patient has had an office visit with the authorizing provider or a provider within the authorizing providers department within the previous 12 mos or has a future within next 30 days. See \"Patient Info\" tab in inbasket, or \"Choose Columns\" in Meds & Orders section of the refill encounter.              Passed - Medication is active on med list             Corrie Shrestha RN 07/23/22 11:25 PM  "

## 2022-08-13 ASSESSMENT — ENCOUNTER SYMPTOMS
WEAKNESS: 0
CONSTIPATION: 0
FEVER: 0
FREQUENCY: 0
DIARRHEA: 0
PARESTHESIAS: 0
SHORTNESS OF BREATH: 0
ABDOMINAL PAIN: 0
SORE THROAT: 0
HEADACHES: 0
PALPITATIONS: 0
HEMATOCHEZIA: 0
NAUSEA: 0
HEARTBURN: 0
COUGH: 0
DYSURIA: 0
HEMATURIA: 0
CHILLS: 0
MYALGIAS: 0
JOINT SWELLING: 0
ARTHRALGIAS: 0
EYE PAIN: 0
NERVOUS/ANXIOUS: 0
DIZZINESS: 0

## 2022-08-13 ASSESSMENT — ACTIVITIES OF DAILY LIVING (ADL): CURRENT_FUNCTION: NO ASSISTANCE NEEDED

## 2022-08-16 ENCOUNTER — OFFICE VISIT (OUTPATIENT)
Dept: INTERNAL MEDICINE | Facility: CLINIC | Age: 77
End: 2022-08-16
Payer: MEDICARE

## 2022-08-16 VITALS
BODY MASS INDEX: 24.32 KG/M2 | WEIGHT: 189.5 LBS | HEART RATE: 52 BPM | DIASTOLIC BLOOD PRESSURE: 60 MMHG | RESPIRATION RATE: 16 BRPM | HEIGHT: 74 IN | TEMPERATURE: 97.6 F | OXYGEN SATURATION: 96 % | SYSTOLIC BLOOD PRESSURE: 110 MMHG

## 2022-08-16 DIAGNOSIS — Z12.5 SCREENING FOR PROSTATE CANCER: ICD-10-CM

## 2022-08-16 DIAGNOSIS — G62.9 POLYNEUROPATHY: ICD-10-CM

## 2022-08-16 DIAGNOSIS — Z86.0100 HISTORY OF COLONIC POLYPS: ICD-10-CM

## 2022-08-16 DIAGNOSIS — I25.10 ATHEROSCLEROSIS OF NATIVE CORONARY ARTERY OF NATIVE HEART WITHOUT ANGINA PECTORIS: ICD-10-CM

## 2022-08-16 DIAGNOSIS — J30.1 NON-SEASONAL ALLERGIC RHINITIS DUE TO POLLEN: ICD-10-CM

## 2022-08-16 DIAGNOSIS — L43.9 LICHEN PLANUS: ICD-10-CM

## 2022-08-16 DIAGNOSIS — Z00.00 ANNUAL PHYSICAL EXAM: Primary | ICD-10-CM

## 2022-08-16 DIAGNOSIS — N13.8 BPH WITH URINARY OBSTRUCTION: ICD-10-CM

## 2022-08-16 DIAGNOSIS — E78.2 HYPERLIPEMIA, MIXED: ICD-10-CM

## 2022-08-16 DIAGNOSIS — E53.8 VITAMIN B12 DEFICIENCY (NON ANEMIC): ICD-10-CM

## 2022-08-16 DIAGNOSIS — N40.1 BPH WITH URINARY OBSTRUCTION: ICD-10-CM

## 2022-08-16 LAB
ALBUMIN SERPL BCG-MCNC: 4.5 G/DL (ref 3.5–5.2)
ALBUMIN UR-MCNC: NEGATIVE MG/DL
ALP SERPL-CCNC: 72 U/L (ref 40–129)
ALT SERPL W P-5'-P-CCNC: 14 U/L (ref 10–50)
ANION GAP SERPL CALCULATED.3IONS-SCNC: 11 MMOL/L (ref 7–15)
APPEARANCE UR: CLEAR
AST SERPL W P-5'-P-CCNC: 23 U/L (ref 10–50)
BACTERIA #/AREA URNS HPF: NORMAL /HPF
BILIRUB SERPL-MCNC: 1.2 MG/DL
BILIRUB UR QL STRIP: NEGATIVE
BUN SERPL-MCNC: 19 MG/DL (ref 8–23)
CALCIUM SERPL-MCNC: 9.4 MG/DL (ref 8.8–10.2)
CHLORIDE SERPL-SCNC: 103 MMOL/L (ref 98–107)
CHOLEST SERPL-MCNC: 103 MG/DL
COLOR UR AUTO: YELLOW
CREAT SERPL-MCNC: 1.03 MG/DL (ref 0.67–1.17)
DEPRECATED HCO3 PLAS-SCNC: 26 MMOL/L (ref 22–29)
ERYTHROCYTE [DISTWIDTH] IN BLOOD BY AUTOMATED COUNT: 13.2 % (ref 10–15)
GFR SERPL CREATININE-BSD FRML MDRD: 75 ML/MIN/1.73M2
GLUCOSE SERPL-MCNC: 100 MG/DL (ref 70–99)
GLUCOSE UR STRIP-MCNC: NEGATIVE MG/DL
HCT VFR BLD AUTO: 44.6 % (ref 40–53)
HDLC SERPL-MCNC: 38 MG/DL
HGB BLD-MCNC: 14.7 G/DL (ref 13.3–17.7)
HGB UR QL STRIP: ABNORMAL
KETONES UR STRIP-MCNC: NEGATIVE MG/DL
LDLC SERPL CALC-MCNC: 47 MG/DL
LEUKOCYTE ESTERASE UR QL STRIP: NEGATIVE
MCH RBC QN AUTO: 28.5 PG (ref 26.5–33)
MCHC RBC AUTO-ENTMCNC: 33 G/DL (ref 31.5–36.5)
MCV RBC AUTO: 86 FL (ref 78–100)
NITRATE UR QL: NEGATIVE
NONHDLC SERPL-MCNC: 65 MG/DL
PH UR STRIP: 6 [PH] (ref 5–8)
PLATELET # BLD AUTO: 178 10E3/UL (ref 150–450)
POTASSIUM SERPL-SCNC: 4.5 MMOL/L (ref 3.4–5.3)
PROT SERPL-MCNC: 6.7 G/DL (ref 6.4–8.3)
PSA SERPL-MCNC: 2.31 NG/ML (ref 0–6.5)
RBC # BLD AUTO: 5.16 10E6/UL (ref 4.4–5.9)
RBC #/AREA URNS AUTO: NORMAL /HPF
SODIUM SERPL-SCNC: 140 MMOL/L (ref 136–145)
SP GR UR STRIP: 1.02 (ref 1–1.03)
SQUAMOUS #/AREA URNS AUTO: NORMAL /LPF
TRIGL SERPL-MCNC: 88 MG/DL
UROBILINOGEN UR STRIP-ACNC: 1 E.U./DL
VIT B12 SERPL-MCNC: 1196 PG/ML (ref 232–1245)
WBC # BLD AUTO: 8.1 10E3/UL (ref 4–11)
WBC #/AREA URNS AUTO: NORMAL /HPF

## 2022-08-16 PROCEDURE — 80061 LIPID PANEL: CPT | Performed by: INTERNAL MEDICINE

## 2022-08-16 PROCEDURE — 99214 OFFICE O/P EST MOD 30 MIN: CPT | Mod: 25 | Performed by: INTERNAL MEDICINE

## 2022-08-16 PROCEDURE — G0439 PPPS, SUBSEQ VISIT: HCPCS | Performed by: INTERNAL MEDICINE

## 2022-08-16 PROCEDURE — 85027 COMPLETE CBC AUTOMATED: CPT | Performed by: INTERNAL MEDICINE

## 2022-08-16 PROCEDURE — 80053 COMPREHEN METABOLIC PANEL: CPT | Performed by: INTERNAL MEDICINE

## 2022-08-16 PROCEDURE — 82607 VITAMIN B-12: CPT | Performed by: INTERNAL MEDICINE

## 2022-08-16 PROCEDURE — 36415 COLL VENOUS BLD VENIPUNCTURE: CPT | Performed by: INTERNAL MEDICINE

## 2022-08-16 PROCEDURE — G0103 PSA SCREENING: HCPCS | Performed by: INTERNAL MEDICINE

## 2022-08-16 PROCEDURE — 81001 URINALYSIS AUTO W/SCOPE: CPT | Performed by: INTERNAL MEDICINE

## 2022-08-16 RX ORDER — LANOLIN ALCOHOL/MO/W.PET/CERES
1000 CREAM (GRAM) TOPICAL DAILY
COMMUNITY

## 2022-08-16 RX ORDER — FINASTERIDE 5 MG/1
5 TABLET, FILM COATED ORAL DAILY
Qty: 90 TABLET | Refills: 3 | Status: SHIPPED | OUTPATIENT
Start: 2022-08-16 | End: 2023-09-28

## 2022-08-16 RX ORDER — TAMSULOSIN HYDROCHLORIDE 0.4 MG/1
0.4 CAPSULE ORAL EVERY EVENING
Qty: 90 CAPSULE | Refills: 4 | Status: SHIPPED | OUTPATIENT
Start: 2022-08-16 | End: 2023-09-06

## 2022-08-16 ASSESSMENT — ENCOUNTER SYMPTOMS
HEADACHES: 0
JOINT SWELLING: 0
DYSURIA: 0
FEVER: 0
SHORTNESS OF BREATH: 0
PARESTHESIAS: 0
DIZZINESS: 0
SORE THROAT: 0
HEMATOCHEZIA: 0
FREQUENCY: 0
CHILLS: 0
NERVOUS/ANXIOUS: 0
MYALGIAS: 0
PALPITATIONS: 0
COUGH: 0
WEAKNESS: 0
ABDOMINAL PAIN: 0
CONSTIPATION: 0
HEMATURIA: 0
ARTHRALGIAS: 0
NAUSEA: 0
DIARRHEA: 0
HEARTBURN: 0
EYE PAIN: 0

## 2022-08-16 ASSESSMENT — ACTIVITIES OF DAILY LIVING (ADL): CURRENT_FUNCTION: NO ASSISTANCE NEEDED

## 2022-08-16 NOTE — PROGRESS NOTES
"SUBJECTIVE:   Asael Crump is a 77 year old male who presents for Preventive Visit.  This 77-year-old man comes in for annual wellness visit and follow-up of medical issues.  Overall he is doing okay.  One of his main issues is that he is urinating quite a bit.  He is getting up at night and he has to frequently use the restroom at grocery stores etc.  He is worried that he might be getting to the point where he will have some incontinence.  He has tried Flomax in the past but had some retrograde ejaculation and so he stopped this.  I had recommended a trial of Cialis but he does not think this helped much and he is no longer taking it.  He has had normal PSAs.  He has a history of heart attack in 2002 with a stent and is on statin and aspirin.  Follows with dermatology, history of colonic polyps with last colonoscopy 2021, has been having some numbness in his feet and is now taking vitamin B12 after we found a low normal B12 level.  The numbness occurs in certain activities and is not present all the time.  He has had a little bit of weight loss and he attributes this to some muscle wasting.  He does not do a lot of resistance training but he does walk.  He said a little bit of balance issue and he eats robustly and a variety of foods has had no nausea vomiting diarrhea.  No fever no night sweats no other constitutional symptoms.    Patient has been advised of split billing requirements and indicates understanding: Yes  Are you in the first 12 months of your Medicare coverage?  No    Healthy Habits:     In general, how would you rate your overall health?  Excellent    Frequency of exercise:  6-7 days/week    Duration of exercise:  Less than 15 minutes    Do you usually eat at least 4 servings of fruit and vegetables a day, include whole grains    & fiber and avoid regularly eating high fat or \"junk\" foods?  No    Taking medications regularly:  Yes    Medication side effects:  None    Ability to successfully " perform activities of daily living:  No assistance needed    Home Safety:  No safety concerns identified    Hearing Impairment:  No hearing concerns    In the past 6 months, have you been bothered by leaking of urine?  No    In general, how would you rate your overall mental or emotional health?  Excellent      PHQ-2 Total Score: 0    Additional concerns today:  No    Do you feel safe in your environment? Yes    Have you ever done Advance Care Planning? (For example, a Health Directive, POLST, or a discussion with a medical provider or your loved ones about your wishes): No, advance care planning information given to patient to review.  Patient plans to discuss their wishes with loved ones or provider.         Fall risk  Fallen 2 or more times in the past year?: No  Any fall with injury in the past year?: No  click delete button to remove this line now  Cognitive Screening   1) Repeat 3 items (Leader, Season, Table)    2) Clock draw: NORMAL  3) 3 item recall: Recalls 3 objects  Results: 3 items recalled: COGNITIVE IMPAIRMENT LESS LIKELY    Mini-CogTM Copyright SUNITHA Bishop. Licensed by the author for use in [facility]; reprinted with permission (mini@[facility]). All rights reserved.      Do you have sleep apnea, excessive snoring or daytime drowsiness?: no    Reviewed and updated as needed this visit by clinical staff   Tobacco  Allergies                 Reviewed and updated as needed this visit by Provider                   Social History     Tobacco Use     Smoking status: Former Smoker     Quit date: 2000     Years since quittin.6     Smokeless tobacco: Never Used   Substance Use Topics     Alcohol use: Yes     Comment: rare     If you drink alcohol do you typically have >3 drinks per day or >7 drinks per week? No    Alcohol Use 2022   Prescreen: >3 drinks/day or >7 drinks/week? No   No flowsheet data found.          Current providers sharing in care for this patient include:   Patient  "Care Team:  Calixto Mancilla MD as PCP - General (Internal Medicine)  Jevon Wright DO as Assigned Heart and Vascular Provider  Calixto Mancilla MD as Assigned PCP    The following health maintenance items are reviewed in Epic and correct as of today:  Health Maintenance Due   Topic Date Due     ZOSTER IMMUNIZATION (1 of 2) Never done     DTAP/TDAP/TD IMMUNIZATION (1 - Tdap) 09/24/2009     MEDICARE ANNUAL WELLNESS VISIT  08/16/2022         Review of Systems   Constitutional: Negative for chills and fever.   HENT: Negative for congestion, ear pain, hearing loss and sore throat.    Eyes: Negative for pain and visual disturbance.   Respiratory: Negative for cough and shortness of breath.    Cardiovascular: Negative for chest pain, palpitations and peripheral edema.   Gastrointestinal: Negative for abdominal pain, constipation, diarrhea, heartburn, hematochezia and nausea.   Genitourinary: Positive for urgency. Negative for dysuria, frequency, genital sores, hematuria, impotence and penile discharge.   Musculoskeletal: Negative for arthralgias, joint swelling and myalgias.   Skin: Negative for rash.   Neurological: Negative for dizziness, weakness, headaches and paresthesias.   Psychiatric/Behavioral: Negative for mood changes. The patient is not nervous/anxious.        OBJECTIVE:   /60 (BP Location: Left arm, Patient Position: Sitting, Cuff Size: Adult Regular)   Pulse 52   Temp 97.6  F (36.4  C) (Tympanic)   Resp 16   Ht 1.88 m (6' 2\")   Wt 86 kg (189 lb 8 oz)   SpO2 96%   BMI 24.33 kg/m   Estimated body mass index is 24.33 kg/m  as calculated from the following:    Height as of this encounter: 1.88 m (6' 2\").    Weight as of this encounter: 86 kg (189 lb 8 oz).  Physical Exam  EYES: Eyelids, conjunctiva, and sclera were normal. Pupils were normal. Cornea, iris, and lens were normal bilaterally.  HEAD, EARS, NOSE, MOUTH, AND THROAT: Head and face were normal. Hearing was normal to voice and the " ears were normal to external exam.  NECK: Neck appearance was normal. There were no neck masses and the thyroid was not enlarged.  RESPIRATORY: Breathing pattern was normal and the chest moved symmetrically.  Percussion/auscultatory percussion was normal.  Lung sounds were normal and there were no abnormal sounds.  CARDIOVASCULAR: Heart rate and rhythm were normal.  S1 and S2 were normal and there were no extra sounds or murmurs. Peripheral pulses in arms and legs were normal.  Jugular venous pressure was normal.  There was no peripheral edema.  GASTROINTESTINAL: The abdomen was normal in contour.  Bowel sounds were present.  Percussion detected no organ enlargement or tenderness.  Palpation detected no tenderness, mass, or enlarged organs.   MUSCULOSKELETAL: Skeletal configuration was normal and muscle mass was normal for age. Joint appearance was overall normal.  LYMPHATIC: There were no enlarged nodes.  SKIN/HAIR/NAILS: Skin color was normal.  There were no skin lesions.  Hair and nails were normal.  NEUROLOGIC: The patient was alert and oriented to person, place, time, and circumstance. Speech was normal. Cranial nerves were normal. Motor strength was normal for age. The patient was normally coordinated.  PSYCHIATRIC:  Mood and affect were normal and the patient had normal recent and remote memory. The patient's judgment and insight were normal.      ASSESSMENT / PLAN:   1. Annual physical exam  This is a 77-year-old man with issues as discussed below.  Ongoing healthy lifestyle discussed and recommended.  I did discuss some balance training with him.  He does not want to do physical therapy.  We discussed focusing on gluteal strengthening.  His balance is actually very good with normal tandem walk and coordination.  He does not have much in terms of gluteal strength.    2. Screening for prostate cancer  - PSA, screen; Future    3. BPH with urinary obstruction  Trial of medications, discussed risks/side effects,  "benefit, need to follow PSA and reestablish baseline.  - finasteride (PROSCAR) 5 MG tablet; Take 1 tablet (5 mg) by mouth daily  Dispense: 90 tablet; Refill: 3  - tamsulosin (FLOMAX) 0.4 MG capsule; Take 1 capsule (0.4 mg) by mouth every evening  Dispense: 90 capsule; Refill: 4    4. CAD, MI 2002 with stent  Continue secondary prevention    5. Hyperlipemia, mixed  As above  - Lipid panel reflex to direct LDL Fasting; Future  - Comprehensive metabolic panel; Future  - CBC with platelets; Future  - UA with Microscopic reflex to Culture - lab collect; Future    6. Lichen Planus - oral  Follows with dermatology    7. Non-seasonal allergic rhinitis due to pollen  Stable    8. Polyneuropathy  Continue with vitamin B12, at this point his symptoms are fairly intermittent and I do not think we are going to be able to determine much from EMG.  If they worsen he will let me know    9. Vitamin B12 deficiency (non anemic)  - Vitamin B12; Future    10. History of colonic polyps  Last colonoscopy March 2021, follow-up in 2024    COUNSELING:    Estimated body mass index is 24.33 kg/m  as calculated from the following:    Height as of this encounter: 1.88 m (6' 2\").    Weight as of this encounter: 86 kg (189 lb 8 oz).    He reports that he quit smoking about 21 years ago. He has never used smokeless tobacco.    Appropriate preventive services were discussed with this patient, including applicable screening as appropriate for cardiovascular disease, diabetes, osteopenia/osteoporosis, and glaucoma.  As appropriate for age/gender, discussed screening for colorectal cancer, prostate cancer, breast cancer, and cervical cancer. Checklist reviewing preventive services available has been given to the patient.    Reviewed patients plan of care and provided an AVS. The Basic Care Plan (routine screening as documented in Health Maintenance) for [place] meets the Care Plan requirement. This Care Plan has been established and reviewed with the " Patient.    Counseling Resources:  ATP IV Guidelines  Pooled Cohorts Equation Calculator  Breast Cancer Risk Calculator  Breast Cancer: Medication to Reduce Risk  FRAX Risk Assessment  ICSI Preventive Guidelines  Dietary Guidelines for Americans, 2010  USDA's MyPlate  ASA Prophylaxis  Lung CA Screening    Calixto Mancilla MD  [facility]    Identified Health Risks:

## 2022-09-24 ENCOUNTER — HEALTH MAINTENANCE LETTER (OUTPATIENT)
Age: 77
End: 2022-09-24

## 2023-03-14 DIAGNOSIS — I25.10 ATHEROSCLEROSIS OF NATIVE CORONARY ARTERY OF NATIVE HEART WITHOUT ANGINA PECTORIS: ICD-10-CM

## 2023-03-14 RX ORDER — ROSUVASTATIN CALCIUM 10 MG/1
10 TABLET, COATED ORAL DAILY
Qty: 90 TABLET | Refills: 3 | Status: SHIPPED | OUTPATIENT
Start: 2023-03-14 | End: 2024-03-11

## 2023-03-15 NOTE — TELEPHONE ENCOUNTER
"Last Written Prescription Date:  2/16/2022  Last Fill Quantity: 90,  # refills: 3   Last office visit provider:  8/16/2022     Requested Prescriptions   Pending Prescriptions Disp Refills     rosuvastatin (CRESTOR) 10 MG tablet [Pharmacy Med Name: ROSUVASTATIN CALCIUM 10 MG TAB] 90 tablet 3     Sig: TAKE 1 TABLET (10 MG) BY MOUTH DAILY.       Statins Protocol Passed - 3/14/2023 12:23 AM        Passed - LDL on file in past 12 months     Recent Labs   Lab Test 08/16/22  0755   LDL 47             Passed - No abnormal creatine kinase in past 12 months     Recent Labs   Lab Test 05/08/20  1523   CKT 35                Passed - Recent (12 mo) or future (30 days) visit within the authorizing provider's specialty     Patient has had an office visit with the authorizing provider or a provider within the authorizing providers department within the previous 12 mos or has a future within next 30 days. See \"Patient Info\" tab in inbasket, or \"Choose Columns\" in Meds & Orders section of the refill encounter.              Passed - Medication is active on med list        Passed - Patient is age 18 or older             Corrie Shrestha RN 03/14/23 10:02 PM  "

## 2023-07-17 ENCOUNTER — PATIENT OUTREACH (OUTPATIENT)
Dept: CARE COORDINATION | Facility: CLINIC | Age: 78
End: 2023-07-17
Payer: MEDICARE

## 2023-07-22 DIAGNOSIS — I25.10 ATHEROSCLEROSIS OF NATIVE CORONARY ARTERY OF NATIVE HEART WITHOUT ANGINA PECTORIS: ICD-10-CM

## 2023-07-22 RX ORDER — ATENOLOL 25 MG/1
TABLET ORAL
Qty: 90 TABLET | Refills: 0 | Status: SHIPPED | OUTPATIENT
Start: 2023-07-22 | End: 2023-10-18

## 2023-07-22 NOTE — TELEPHONE ENCOUNTER
"Last Written Prescription Date:  07/23/2022  Last Fill Quantity: 90,  # refills: 3   Last office visit provider:  08/16/2022     Requested Prescriptions   Pending Prescriptions Disp Refills     atenolol (TENORMIN) 25 MG tablet [Pharmacy Med Name: ATENOLOL 25 MG TABLET] 90 tablet 3     Sig: TAKE 1 TABLET BY MOUTH EVERY DAY       Beta-Blockers Protocol Passed - 7/22/2023  8:46 AM        Passed - Blood pressure under 140/90 in past 12 months     BP Readings from Last 3 Encounters:   08/16/22 110/60   04/25/22 111/64   02/16/22 104/60                 Passed - Patient is age 6 or older        Passed - Recent (12 mo) or future (30 days) visit within the authorizing provider's specialty     Patient has had an office visit with the authorizing provider or a provider within the authorizing providers department within the previous 12 mos or has a future within next 30 days. See \"Patient Info\" tab in inbasket, or \"Choose Columns\" in Meds & Orders section of the refill encounter.              Passed - Medication is active on med list             Rene Graham RN 07/22/23 3:12 PM  "

## 2023-07-31 ENCOUNTER — PATIENT OUTREACH (OUTPATIENT)
Dept: CARE COORDINATION | Facility: CLINIC | Age: 78
End: 2023-07-31
Payer: MEDICARE

## 2023-09-06 DIAGNOSIS — N40.1 BPH WITH URINARY OBSTRUCTION: ICD-10-CM

## 2023-09-06 DIAGNOSIS — N13.8 BPH WITH URINARY OBSTRUCTION: ICD-10-CM

## 2023-09-06 RX ORDER — TAMSULOSIN HYDROCHLORIDE 0.4 MG/1
0.4 CAPSULE ORAL EVERY EVENING
Qty: 90 CAPSULE | Refills: 4 | Status: SHIPPED | OUTPATIENT
Start: 2023-09-06

## 2023-09-06 NOTE — TELEPHONE ENCOUNTER
"Routing refill request to provider for review/approval because:  Patient needs to be seen because it has been more than 1 year since last office visit.  Last BP on file over a year ago.    Last Written Prescription Date:  8/16/2022  Last Fill Quantity: 90,  # refills: 4   Last office visit provider:  8/16/2022     Requested Prescriptions   Pending Prescriptions Disp Refills    tamsulosin (FLOMAX) 0.4 MG capsule [Pharmacy Med Name: TAMSULOSIN HCL 0.4 MG CAPSULE] 90 capsule 4     Sig: TAKE 1 CAPSULE (0.4 MG) BY MOUTH EVERY EVENING       Alpha Blockers Failed - 9/6/2023  2:15 PM        Failed - Blood pressure under 140/90 in past 12 months     BP Readings from Last 3 Encounters:   08/16/22 110/60   04/25/22 111/64   02/16/22 104/60                 Failed - Recent (12 mo) or future (30 days) visit within the authorizing provider's specialty     Patient has had an office visit with the authorizing provider or a provider within the authorizing providers department within the previous 12 mos or has a future within next 30 days. See \"Patient Info\" tab in inbasket, or \"Choose Columns\" in Meds & Orders section of the refill encounter.              Passed - Patient does not have Tadalafil, Vardenafil, or Sildenafil on their medication list        Passed - Medication is active on med list        Passed - Patient is 18 years of age or older             Kamryn Mccauley RN 09/06/23 2:15 PM  "

## 2023-09-28 ENCOUNTER — OFFICE VISIT (OUTPATIENT)
Dept: INTERNAL MEDICINE | Facility: CLINIC | Age: 78
End: 2023-09-28
Payer: MEDICARE

## 2023-09-28 VITALS
DIASTOLIC BLOOD PRESSURE: 60 MMHG | BODY MASS INDEX: 24.36 KG/M2 | RESPIRATION RATE: 16 BRPM | WEIGHT: 189.8 LBS | HEART RATE: 50 BPM | TEMPERATURE: 98.7 F | SYSTOLIC BLOOD PRESSURE: 107 MMHG | OXYGEN SATURATION: 99 % | HEIGHT: 74 IN

## 2023-09-28 DIAGNOSIS — N40.1 BPH WITH URINARY OBSTRUCTION: ICD-10-CM

## 2023-09-28 DIAGNOSIS — Z12.5 SCREENING FOR PROSTATE CANCER: ICD-10-CM

## 2023-09-28 DIAGNOSIS — E53.8 VITAMIN B12 DEFICIENCY (NON ANEMIC): ICD-10-CM

## 2023-09-28 DIAGNOSIS — N13.8 BPH WITH URINARY OBSTRUCTION: ICD-10-CM

## 2023-09-28 DIAGNOSIS — L43.9 LICHEN PLANUS: ICD-10-CM

## 2023-09-28 DIAGNOSIS — R26.89 POOR BALANCE: ICD-10-CM

## 2023-09-28 DIAGNOSIS — G62.9 POLYNEUROPATHY: ICD-10-CM

## 2023-09-28 DIAGNOSIS — Z86.0100 HISTORY OF COLONIC POLYPS: ICD-10-CM

## 2023-09-28 DIAGNOSIS — E78.2 HYPERLIPEMIA, MIXED: ICD-10-CM

## 2023-09-28 DIAGNOSIS — I25.10 ATHEROSCLEROSIS OF NATIVE CORONARY ARTERY OF NATIVE HEART WITHOUT ANGINA PECTORIS: ICD-10-CM

## 2023-09-28 DIAGNOSIS — Z00.00 ANNUAL PHYSICAL EXAM: Primary | ICD-10-CM

## 2023-09-28 DIAGNOSIS — J30.1 NON-SEASONAL ALLERGIC RHINITIS DUE TO POLLEN: ICD-10-CM

## 2023-09-28 LAB
ALBUMIN SERPL BCG-MCNC: 4.4 G/DL (ref 3.5–5.2)
ALBUMIN UR-MCNC: NEGATIVE MG/DL
ALP SERPL-CCNC: 58 U/L (ref 40–129)
ALT SERPL W P-5'-P-CCNC: 11 U/L (ref 0–70)
ANION GAP SERPL CALCULATED.3IONS-SCNC: 10 MMOL/L (ref 7–15)
APPEARANCE UR: CLEAR
AST SERPL W P-5'-P-CCNC: 21 U/L (ref 0–45)
BACTERIA #/AREA URNS HPF: NORMAL /HPF
BILIRUB SERPL-MCNC: 0.9 MG/DL
BILIRUB UR QL STRIP: NEGATIVE
BUN SERPL-MCNC: 18.6 MG/DL (ref 8–23)
CALCIUM SERPL-MCNC: 9 MG/DL (ref 8.8–10.2)
CHLORIDE SERPL-SCNC: 106 MMOL/L (ref 98–107)
CHOLEST SERPL-MCNC: 96 MG/DL
COLOR UR AUTO: YELLOW
CREAT SERPL-MCNC: 0.94 MG/DL (ref 0.67–1.17)
EGFRCR SERPLBLD CKD-EPI 2021: 83 ML/MIN/1.73M2
ERYTHROCYTE [DISTWIDTH] IN BLOOD BY AUTOMATED COUNT: 13.3 % (ref 10–15)
GLUCOSE SERPL-MCNC: 107 MG/DL (ref 70–99)
GLUCOSE UR STRIP-MCNC: NEGATIVE MG/DL
HCO3 SERPL-SCNC: 24 MMOL/L (ref 22–29)
HCT VFR BLD AUTO: 43.1 % (ref 40–53)
HDLC SERPL-MCNC: 39 MG/DL
HGB BLD-MCNC: 14.2 G/DL (ref 13.3–17.7)
HGB UR QL STRIP: ABNORMAL
KETONES UR STRIP-MCNC: NEGATIVE MG/DL
LDLC SERPL CALC-MCNC: 39 MG/DL
LEUKOCYTE ESTERASE UR QL STRIP: NEGATIVE
MCH RBC QN AUTO: 28.8 PG (ref 26.5–33)
MCHC RBC AUTO-ENTMCNC: 32.9 G/DL (ref 31.5–36.5)
MCV RBC AUTO: 87 FL (ref 78–100)
NITRATE UR QL: NEGATIVE
NONHDLC SERPL-MCNC: 57 MG/DL
PH UR STRIP: 5.5 [PH] (ref 5–8)
PLATELET # BLD AUTO: 144 10E3/UL (ref 150–450)
POTASSIUM SERPL-SCNC: 4.4 MMOL/L (ref 3.4–5.3)
PROT SERPL-MCNC: 6.4 G/DL (ref 6.4–8.3)
PSA SERPL DL<=0.01 NG/ML-MCNC: 2.17 NG/ML (ref 0–6.5)
RBC # BLD AUTO: 4.93 10E6/UL (ref 4.4–5.9)
RBC #/AREA URNS AUTO: NORMAL /HPF
SODIUM SERPL-SCNC: 140 MMOL/L (ref 135–145)
SP GR UR STRIP: 1.02 (ref 1–1.03)
SQUAMOUS #/AREA URNS AUTO: NORMAL /LPF
TRIGL SERPL-MCNC: 89 MG/DL
UROBILINOGEN UR STRIP-ACNC: 1 E.U./DL
VIT B12 SERPL-MCNC: 1288 PG/ML (ref 232–1245)
WBC # BLD AUTO: 6.5 10E3/UL (ref 4–11)
WBC #/AREA URNS AUTO: NORMAL /HPF

## 2023-09-28 PROCEDURE — G0439 PPPS, SUBSEQ VISIT: HCPCS | Performed by: INTERNAL MEDICINE

## 2023-09-28 PROCEDURE — 80053 COMPREHEN METABOLIC PANEL: CPT | Performed by: INTERNAL MEDICINE

## 2023-09-28 PROCEDURE — 99214 OFFICE O/P EST MOD 30 MIN: CPT | Mod: 25 | Performed by: INTERNAL MEDICINE

## 2023-09-28 PROCEDURE — 36415 COLL VENOUS BLD VENIPUNCTURE: CPT | Performed by: INTERNAL MEDICINE

## 2023-09-28 PROCEDURE — 81001 URINALYSIS AUTO W/SCOPE: CPT | Performed by: INTERNAL MEDICINE

## 2023-09-28 PROCEDURE — 82607 VITAMIN B-12: CPT | Performed by: INTERNAL MEDICINE

## 2023-09-28 PROCEDURE — G0103 PSA SCREENING: HCPCS | Performed by: INTERNAL MEDICINE

## 2023-09-28 PROCEDURE — 85027 COMPLETE CBC AUTOMATED: CPT | Performed by: INTERNAL MEDICINE

## 2023-09-28 PROCEDURE — 80061 LIPID PANEL: CPT | Performed by: INTERNAL MEDICINE

## 2023-09-28 ASSESSMENT — ENCOUNTER SYMPTOMS
CONSTIPATION: 0
MYALGIAS: 0
HEARTBURN: 0
SHORTNESS OF BREATH: 0
DIZZINESS: 0
DIARRHEA: 0
CHILLS: 0
NAUSEA: 0
SORE THROAT: 0
HEADACHES: 0
DYSURIA: 0
HEMATURIA: 0
WEAKNESS: 0
HEMATOCHEZIA: 0
PALPITATIONS: 0
PARESTHESIAS: 0
COUGH: 0
EYE PAIN: 0
NERVOUS/ANXIOUS: 0
JOINT SWELLING: 0
FEVER: 0
FREQUENCY: 1
ABDOMINAL PAIN: 0
ARTHRALGIAS: 0

## 2023-09-28 ASSESSMENT — ACTIVITIES OF DAILY LIVING (ADL): CURRENT_FUNCTION: NO ASSISTANCE NEEDED

## 2023-09-28 NOTE — PROGRESS NOTES
"SUBJECTIVE:   Asael is a 78 year old who presents for Preventive Visit.      9/28/2023     8:25 AM   Additional Questions   Roomed by jorge a   Accompanied by alone         9/28/2023     8:25 AM   Patient Reported Additional Medications   Patient reports taking the following new medications none       Are you in the first 12 months of your Medicare coverage?  No    Healthy Habits:     In general, how would you rate your overall health?  Excellent    Frequency of exercise:  4-5 days/week    Duration of exercise:  15-30 minutes    Do you usually eat at least 4 servings of fruit and vegetables a day, include whole grains    & fiber and avoid regularly eating high fat or \"junk\" foods?  No    Taking medications regularly:  Yes    Medication side effects:  None    Ability to successfully perform activities of daily living:  No assistance needed    Home Safety:  No safety concerns identified    Hearing Impairment:  No hearing concerns    In the past 6 months, have you been bothered by leaking of urine? Yes    In general, how would you rate your overall mental or emotional health?  Excellent    Additional concerns today:  No      Today's PHQ-2 Score:       9/28/2023     8:12 AM   PHQ-2 ( 1999 Pfizer)   Q1: Little interest or pleasure in doing things 0   Q2: Feeling down, depressed or hopeless 0   PHQ-2 Score 0   Q1: Little interest or pleasure in doing things Not at all   Q2: Feeling down, depressed or hopeless Not at all   PHQ-2 Score 0           Have you ever done Advance Care Planning? (For example, a Health Directive, POLST, or a discussion with a medical provider or your loved ones about your wishes): No, advance care planning information given to patient to review.  Patient declined advance care planning discussion at this time.       Fall risk  Fallen 2 or more times in the past year?: No  Any fall with injury in the past year?: No    Cognitive Screening   1) Repeat 3 items (Leader, Season, Table)    2) Clock draw: " NORMAL  3) 3 item recall: Recalls 3 objects  Results: 3 items recalled: COGNITIVE IMPAIRMENT LESS LIKELY    Mini-CogTM Copyright SUNITHA Bishop. Licensed by the author for use in [facility]; reprinted with permission (mini@[facility]). All rights reserved.      Do you have sleep apnea, excessive snoring or daytime drowsiness? : no    Reviewed and updated as needed this visit by clinical staff   Tobacco  Allergies  Meds              Reviewed and updated as needed this visit by Provider                 Social History     Tobacco Use    Smoking status: Former     Types: Cigarettes     Quit date: 2000     Years since quittin.7    Smokeless tobacco: Never   Substance Use Topics    Alcohol use: Yes     Comment: rare             2023     8:11 AM   Alcohol Use   Prescreen: >3 drinks/day or >7 drinks/week? No     Do you have a current opioid prescription? No  Do you use any other controlled substances or medications that are not prescribed by a provider? None    Current providers sharing in care for this patient include:   Patient Care Team:  Calixto Mancilla MD as PCP - General (Internal Medicine)  Calixto Mancilla MD as Assigned PCP    The following health maintenance items are reviewed in Epic and correct as of today:  Health Maintenance   Topic Date Due    ZOSTER IMMUNIZATION (1 of 2) Never done    LUNG CANCER SCREENING  Never done    DTAP/TDAP/TD IMMUNIZATION (3 - Td or Tdap) 2019    COVID-19 Vaccine (7 - Pfizer series) 04/10/2023    ANNUAL REVIEW OF HM ORDERS  2023    MEDICARE ANNUAL WELLNESS VISIT  2023    INFLUENZA VACCINE (1) 2023    FALL RISK ASSESSMENT  2024    LIPID  [id number]    ADVANCE CARE PLANNING  2027    PHQ-2 (once per calendar year)  Completed    Pneumococcal Vaccine: 65+ Years  Completed    IPV IMMUNIZATION  Aged Out    HPV IMMUNIZATION  Aged Out    MENINGITIS IMMUNIZATION  Aged Out    HEPATITIS C SCREENING  Discontinued    COLORECTAL CANCER  "SCREENING  Discontinued       Review of Systems   Constitutional:  Negative for chills and fever.   HENT:  Negative for congestion, ear pain, hearing loss and sore throat.    Eyes:  Negative for pain and visual disturbance.   Respiratory:  Negative for cough and shortness of breath.    Cardiovascular:  Negative for chest pain, palpitations and peripheral edema.   Gastrointestinal:  Negative for abdominal pain, constipation, diarrhea, heartburn, hematochezia and nausea.   Genitourinary:  Positive for frequency and urgency. Negative for dysuria, genital sores, hematuria, impotence and penile discharge.   Musculoskeletal:  Negative for arthralgias, joint swelling and myalgias.   Skin:  Negative for rash.   Neurological:  Negative for dizziness, weakness, headaches and paresthesias.   Psychiatric/Behavioral:  Negative for mood changes. The patient is not nervous/anxious.        OBJECTIVE:   /60 (BP Location: Left arm, Patient Position: Sitting, Cuff Size: Adult Regular)   Pulse 50   Temp 98.7  F (37.1  C) (Tympanic)   Resp 16   Ht 1.88 m (6' 2\")   Wt 86.1 kg (189 lb 12.8 oz)   SpO2 99%   BMI 24.37 kg/m   Estimated body mass index is 24.37 kg/m  as calculated from the following:    Height as of this encounter: 1.88 m (6' 2\").    Weight as of this encounter: 86.1 kg (189 lb 12.8 oz).  Physical Exam  EYES: Eyelids, conjunctiva, and sclera were normal. Pupils were normal. Cornea, iris, and lens were normal bilaterally.  HEAD, EARS, NOSE, MOUTH, AND THROAT: Head and face were normal. Hearing was normal to voice and the ears were normal to external exam. Nose appearance was normal and there was no discharge. Oropharynx was normal.  NECK: Neck appearance was normal. There were no neck masses and the thyroid was not enlarged.  RESPIRATORY: Breathing pattern was normal and the chest moved symmetrically.  Percussion/auscultatory percussion was normal.  Lung sounds were normal and there were no abnormal " sounds.  CARDIOVASCULAR: Heart rate and rhythm were normal.  S1 and S2 were normal and there were no extra sounds or murmurs. Peripheral pulses in arms and legs were normal.  Jugular venous pressure was normal.  There was no peripheral edema.  GASTROINTESTINAL: The abdomen was normal in contour.    NEUROLOGIC: The patient was alert and oriented to person, place, time, and circumstance. Speech was normal. Cranial nerves were normal. Motor strength was normal for age. The patient was normally coordinated.  PSYCHIATRIC:  Mood and affect were normal and the patient had normal recent and remote memory. The patient's judgment and insight were normal.  ASSESSMENT / PLAN:   1. Annual physical exam  Is a 70-year-old man with issues as discussed below    2. Screening for prostate cancer  - Prostate Specific Antigen Screen; Future  - Prostate Specific Antigen Screen    3. History of colonic polyps  Due for follow-up next March    4. CAD, MI 2002 with stent  Continue secondary prevention    5. Hyperlipemia, mixed  Continue statin  - CBC with platelets; Future  - Comprehensive metabolic panel; Future  - Lipid panel reflex to direct LDL Fasting; Future  - UA Macroscopic with reflex to Microscopic and Culture; Future  - CBC with platelets  - Comprehensive metabolic panel  - Lipid panel reflex to direct LDL Fasting  - UA Macroscopic with reflex to Microscopic and Culture  - UA Microscopic with Reflex to Culture    6. BPH with urinary obstruction  Stable not taking finasteride    7. Lichen Planus - oral  Stable    8. Non-seasonal allergic rhinitis due to pollen  Stable    9. Polyneuropathy  10. Vitamin B12 deficiency (non anemic)  Improved with vitamin B12 supplementation  - Vitamin B12; Future  - Vitamin B12    11. Poor balance  Would benefit from strength training including gluteal strengthening  - Physical Therapy Referral; Future      COUNSELING:  Reviewed preventive health counseling, as reflected in patient instructions    He  reports that he quit smoking about 22 years ago. He has never used smokeless tobacco.    Appropriate preventive services were discussed with this patient, including applicable screening as appropriate for cardiovascular disease, diabetes, osteopenia/osteoporosis, and glaucoma.  As appropriate for age/gender, discussed screening for colorectal cancer, prostate cancer, breast cancer, and cervical cancer. Checklist reviewing preventive services available has been given to the patient.    Reviewed patients plan of care and provided an AVS. The Basic Care Plan (routine screening as documented in Health Maintenance) for Asael meets the Care Plan requirement. This Care Plan has been established and reviewed with the Patient.          Calixto Mancilla MD  [facility]    Identified Health Risks:  I have reviewed Opioid Use Disorder and Substance Use Disorder risk factors and made any needed referrals.

## 2023-09-28 NOTE — PROGRESS NOTES
The patient was counseled and encouraged to consider modifying their diet and eating habits. He was provided with information on recommended healthy diet options.  Information on urinary incontinence and treatment options given to patient.

## 2023-09-28 NOTE — PATIENT INSTRUCTIONS
Patient Education   Personalized Prevention Plan  You are due for the preventive services outlined below.  Your care team is available to assist you in scheduling these services.  If you have already completed any of these items, please share that information with your care team to update in your medical record.  Health Maintenance Due   Topic Date Due     Zoster (Shingles) Vaccine (1 of 2) Never done     Diptheria Tetanus Pertussis (DTAP/TDAP/TD) Vaccine (3 - Td or Tdap) 09/23/2019     COVID-19 Vaccine (7 - Pfizer series) 04/10/2023     Flu Vaccine (1) 09/01/2023     Learning About Dietary Guidelines  What are the Dietary Guidelines for Americans?     Dietary Guidelines for Americans provide tips for eating well and staying healthy. This helps reduce the risk for long-term (chronic) diseases.  These guidelines recommend that you:  Eat and drink the right amount for you. The U.[place] government's food guide is called MyPlate. It can help you make your own well-balanced eating plan.  Try to balance your eating with your activity. This helps you stay at a healthy weight.  Drink alcohol in moderation, if at all.  Limit foods high in salt, saturated fat, trans fat, and added sugar.  These guidelines are from the U.S. Department of Agriculture and the U.S. Department of Health and Human Services. They are updated every 5 years.  What is MyPlate?  MyPlate is the U.[place] government's food guide. It can help you make your own well-balanced eating plan. A balanced eating plan means that you eat enough, but not too much, and that your food gives you the nutrients you need to stay healthy.  MyPlate focuses on eating plenty of whole grains, fruits, and vegetables, and on limiting fat and sugar. It is available online at www.ChooseMyPlate.gov.  How can you get started?  If you're trying to eat healthier, you can slowly change your eating habits over time. You don't have to make big changes all at once. Start by adding one or two  "healthy foods to your meals each day.  Grains  Choose whole-grain breads and cereals and whole-wheat pasta and whole-grain crackers.  Vegetables  Eat a variety of vegetables every day. They have lots of nutrients and are part of a heart-healthy diet.  Fruits  Eat a variety of fruits every day. Fruits contain lots of nutrients. Choose fresh fruit instead of fruit juice.  Protein foods  Choose fish and lean poultry more often. Eat red meat and fried meats less often. Dried beans, tofu, and nuts are also good sources of protein.  Dairy  Choose low-fat or fat-free products from this food group. If you have problems digesting milk, try eating cheese or yogurt instead.  Fats and oils  Limit fats and oils if you're trying to cut calories. Choose healthy fats when you cook. These include canola oil and olive oil.  Where can you learn more?  Go to https://www.[employer].net/patiented  Enter D676 in the search box to learn more about \"Learning About Dietary Guidelines.\"  Current as of: March 1, 2023               Content Version: 13.7    9070-0100 [employer].   Care instructions adapted under license by your healthcare professional. If you have questions about a medical condition or this instruction, always ask your healthcare professional. [employer] disclaims any warranty or liability for your use of this information.      Bladder Training: Care Instructions  Your Care Instructions     Bladder training is used to treat urge incontinence and stress incontinence. Urge incontinence means that the need to urinate comes on so fast that you can't get to a toilet in time. Stress incontinence means that you leak urine because of pressure on your bladder. For example, it may happen when you laugh, cough, or lift something heavy.  Bladder training can increase how long you can wait before you have to urinate. It can also help your bladder hold more urine. And it can give you better control over the urge to " urinate.  It is important to remember that bladder training takes a few weeks to a few months to make a difference. You may not see results right away, but don't give up.  Follow-up care is a key part of your treatment and safety. Be sure to make and go to all appointments, and call your doctor if you are having problems. It's also a good idea to know your test results and keep a list of the medicines you take.  How can you care for yourself at home?  Work with your doctor to come up with a bladder training program that is right for you. You may use one or more of the following methods.  Delayed urination  In the beginning, try to keep from urinating for 5 minutes after you first feel the need to go.  While you wait, take deep, slow breaths to relax. Kegel exercises can also help you delay the need to go to the bathroom.  After some practice, when you can easily wait 5 minutes to urinate, try to wait 10 minutes before you urinate.  Slowly increase the waiting period until you are able to control when you have to urinate.  Scheduled urination  Empty your bladder when you first wake up in the morning.  Schedule times throughout the day when you will urinate.  Start by going to the bathroom every hour, even if you don't need to go.  Slowly increase the time between trips to the bathroom.  When you have found a schedule that works well for you, keep doing it.  If you wake up during the night and have to urinate, do it. Apply your schedule to waking hours only.  Kegel exercises  These tighten and strengthen pelvic muscles, which can help you control the flow of urine. (If doing these exercises causes pain, stop doing them and talk with your doctor.) To do Kegel exercises:  Squeeze your muscles as if you were trying not to pass gas. Or squeeze your muscles as if you were stopping the flow of urine. Your belly, legs, and buttocks shouldn't move.  Hold the squeeze for 3 seconds, then relax for 5 to 10 seconds.  Start with 3  "seconds, then add 1 second each week until you are able to squeeze for 10 seconds.  Repeat the exercise 10 times a session. Do 3 to 8 sessions a day.  When should you call for help?  Watch closely for changes in your health, and be sure to contact your doctor if:    Your incontinence is getting worse.     You do not get better as expected.   Where can you learn more?  Go to https://www.[employer].net/patiented  Enter V684 in the search box to learn more about \"Bladder Training: Care Instructions.\"  Current as of: March 1, 2023               Content Version: 13.7    7707-1193 [employer].   Care instructions adapted under license by your healthcare professional. If you have questions about a medical condition or this instruction, always ask your healthcare professional. [employer] disclaims any warranty or liability for your use of this information.         "

## 2023-10-06 ENCOUNTER — THERAPY VISIT (OUTPATIENT)
Dept: PHYSICAL THERAPY | Facility: REHABILITATION | Age: 78
End: 2023-10-06
Payer: MEDICARE

## 2023-10-06 DIAGNOSIS — R26.89 POOR BALANCE: ICD-10-CM

## 2023-10-06 PROCEDURE — 97110 THERAPEUTIC EXERCISES: CPT | Mod: GP | Performed by: PHYSICAL THERAPIST

## 2023-10-06 PROCEDURE — 97162 PT EVAL MOD COMPLEX 30 MIN: CPT | Mod: GP | Performed by: PHYSICAL THERAPIST

## 2023-10-06 NOTE — PROGRESS NOTES
PHYSICAL THERAPY EVALUATION  Type of Visit: Evaluation    See electronic medical record for Abuse and Falls Screening details.    Subjective       Presenting condition or subjective complaint: Lower back pain and pain in left leg. He had his physical last week and he had difficulty getting out of a chair and fell back into the chair. Since then he has been having pain in his back with some pain radiating into his left leg. Pain at its worst is 7/10 and best is 0/10. The pain is worst with walking and best with rest and ibuprofen.   Date of onset: 09/28/23    Relevant medical history:     Dates & types of surgery: Surgery on both big toes. 10 plus years ago.    Prior diagnostic imaging/testing results:       Prior therapy history for the same diagnosis, illness or injury: No      Living Environment  Social support: With a significant other or spouse   Type of home: House; Multi-level; Basement   Stairs to enter the home: Yes 3 Is there a railing: Yes   Ramp: No   Stairs inside the home: Yes 3 Is there a railing: Yes   Help at home: None  Equipment owned:       Employment: Not Applicable    Hobbies/Interests: Golf    Patient goals for therapy: Be pain free.    Pain assessment: Pain present     Objective   LUMBAR SPINE EVALUATION  POSTURE: Standing Posture: Rounded shoulders, Forward head, Lordosis decreased, Thoracic kyphosis increased  Sitting Posture: Rounded shoulders, Forward head  GAIT:   Assistive Device(s): None  Gait Deviations: Stride length decreased  Shanelle decreased  Mild increase in trunk flexion  BALANCE/PROPRIOCEPTION:  To be assessed next time.  ROM: WFL  PELVIC/SI SCREEN: WFL  MYOTOMES:    Left Right   T12-L3 (Hip Flexion) 4- 5   L2-4 (Quads)  5 5   L4 (Ankle DF) 5 5   L5 (Great Toe Ext) 5 5   S1 (Toe Raise) 5 5     DTR S:    Left Right   L4 (Quad) 3 1   S1 (Achilles) 1 1   NEURAL TENSION:    Left Right   SLR Positive Negative    SLR with DF Positive Negative    Slump Positive Negative       FLEXIBILITY:  Mild to moderate limitation in hamstrings, more on left than right.  LUMBAR/HIP Special Tests:    Left Right   DONAVAN Negative  Negative    FADIR/Labrum/MARIBEL Negative  Negative    SLR Positive Negative    Slump Positive Negative       PELVIS/SI SPECIAL TESTS: WFL  FUNCTIONAL TESTS: Double Leg Squat: Anterior knee translation, Increased trunk lean, Improper use of glutes/hips, and Able to perform full deep squat without pain  PALPATION:  Significant taut bands and tenderness in medial gastroc/soleus, though his pain is in lateral calf.   SEGMENTAL MOBILITY: Hypomobile and painful from T12-L4. The rest of his spine is WNL.    Assessment & Plan   CLINICAL IMPRESSIONS  Medical Diagnosis: Poor balance    Treatment Diagnosis: Impaired balance, low back pain with left radicular symptoms.   Impression/Assessment: Patient is a 78 year old male with low back pain and radicular pain complaints.  The following significant findings have been identified: Pain, Decreased ROM/flexibility, Decreased joint mobility, and Decreased strength. These impairments interfere with their ability to perform recreational activities and household chores as compared to previous level of function. His primary complaint today was the back pain so balance will be formally assessed next session.    Clinical Decision Making (Complexity):  Clinical Presentation: Evolving/Changing  Clinical Presentation Rationale: based on medical and personal factors listed in PT evaluation  Clinical Decision Making (Complexity): Moderate complexity    PLAN OF CARE  Treatment Interventions:  Modalities: Dry Needling  Interventions: Gait Training, Manual Therapy, Neuromuscular Re-education, Therapeutic Activity, Therapeutic Exercise, Self-Care/Home Management    Long Term Goals     PT Goal 1  Goal Identifier: HEP  Goal Description: Asael will be independent in their HEP in order to facilitate return to prior level of function.  Goal Progress: In  progress  Target Date: 01/04/24  PT Goal 2  Goal Identifier: Centralization of symptoms  Goal Description: Asael will demonstrate centralization of their symptoms for >/= four days per week in order to improve their quality of life and improve their ADLs.  Goal Progress: To left lateral calf  Target Date: 01/04/24  PT Goal 3  Goal Identifier: APTA sit to stand  Goal Description: Asael will demonstrate improvements in his symptoms as measured by being able to complete five sit to stands without use of his UEs.  Goal Progress: 0  Target Date: 01/04/24      Frequency of Treatment: 1 x/week  Duration of Treatment: 90 days    Education Assessment:        Risks and benefits of evaluation/treatment have been explained.   Patient/Family/caregiver agrees with Plan of Care.     Evaluation Time:     PT Eval, Moderate Complexity Minutes (51526): 26     Signing Clinician: ADRIANA KNIGHT [facility]                                                                                   OUTPATIENT PHYSICAL THERAPY      PLAN OF TREATMENT FOR OUTPATIENT REHABILITATION   Patient's Last Name, First Name, M.Asael Keene YOB: 1945   Provider's Name   [facility]   Medical Record No.  [id number]     Onset Date: 09/28/23  Start of Care Date: 10/06/23     Medical Diagnosis:  Poor balance      PT Treatment Diagnosis:  Impaired balance, low back pain with left radicular symptoms. Plan of Treatment  Frequency/Duration: 1 x/week/ 90 days    Certification date from 10/06/23 to 01/04/24         See note for plan of treatment details and functional goals     SYLVIA PHILLIPS, PT                         I CERTIFY THE NEED FOR THESE SERVICES FURNISHED UNDER        THIS PLAN OF TREATMENT AND WHILE UNDER MY CARE     (Physician attestation of this document indicates review and certification of the therapy plan).                Referring Provider:  Calixto Mancilla,  MD      Initial Assessment  See Epic Evaluation- Start of Care Date: 10/06/23

## 2023-10-18 DIAGNOSIS — I25.10 ATHEROSCLEROSIS OF NATIVE CORONARY ARTERY OF NATIVE HEART WITHOUT ANGINA PECTORIS: ICD-10-CM

## 2023-10-18 RX ORDER — ATENOLOL 25 MG/1
TABLET ORAL
Qty: 90 TABLET | Refills: 2 | Status: SHIPPED | OUTPATIENT
Start: 2023-10-18 | End: 2024-07-15

## 2023-10-19 ENCOUNTER — MYC MEDICAL ADVICE (OUTPATIENT)
Dept: INTERNAL MEDICINE | Facility: CLINIC | Age: 78
End: 2023-10-19
Payer: MEDICARE

## 2023-10-31 ENCOUNTER — ANCILLARY PROCEDURE (OUTPATIENT)
Dept: GENERAL RADIOLOGY | Facility: CLINIC | Age: 78
End: 2023-10-31
Attending: INTERNAL MEDICINE
Payer: MEDICARE

## 2023-10-31 ENCOUNTER — OFFICE VISIT (OUTPATIENT)
Dept: INTERNAL MEDICINE | Facility: CLINIC | Age: 78
End: 2023-10-31
Payer: MEDICARE

## 2023-10-31 VITALS
WEIGHT: 190.1 LBS | HEART RATE: 54 BPM | RESPIRATION RATE: 15 BRPM | BODY MASS INDEX: 24.4 KG/M2 | TEMPERATURE: 97.8 F | DIASTOLIC BLOOD PRESSURE: 62 MMHG | OXYGEN SATURATION: 99 % | SYSTOLIC BLOOD PRESSURE: 101 MMHG | HEIGHT: 74 IN

## 2023-10-31 DIAGNOSIS — B35.1 TOENAIL FUNGUS: ICD-10-CM

## 2023-10-31 DIAGNOSIS — E78.2 HYPERLIPEMIA, MIXED: ICD-10-CM

## 2023-10-31 DIAGNOSIS — M54.16 LUMBAR BACK PAIN WITH RADICULOPATHY AFFECTING LEFT LOWER EXTREMITY: Primary | ICD-10-CM

## 2023-10-31 DIAGNOSIS — M54.16 LUMBAR BACK PAIN WITH RADICULOPATHY AFFECTING LEFT LOWER EXTREMITY: ICD-10-CM

## 2023-10-31 PROCEDURE — 72100 X-RAY EXAM L-S SPINE 2/3 VWS: CPT | Mod: TC | Performed by: RADIOLOGY

## 2023-10-31 PROCEDURE — 99214 OFFICE O/P EST MOD 30 MIN: CPT | Performed by: INTERNAL MEDICINE

## 2023-10-31 RX ORDER — RESPIRATORY SYNCYTIAL VIRUS VACCINE 120MCG/0.5
0.5 KIT INTRAMUSCULAR ONCE
Qty: 1 EACH | Refills: 0 | Status: CANCELLED | OUTPATIENT
Start: 2023-10-31 | End: 2023-10-31

## 2023-10-31 ASSESSMENT — ENCOUNTER SYMPTOMS: LEG PAIN: 1

## 2023-10-31 ASSESSMENT — PAIN SCALES - GENERAL: PAINLEVEL: NO PAIN (1)

## 2023-10-31 NOTE — PROGRESS NOTES
"  Office Visit - Follow Up   Asael Crump   78 year old male    Date of Visit: 10/31/2023    Chief Complaint   Patient presents with    Recheck Medication    Leg Pain     Left calf pain    Nail Problem     Toenail fungus        Assessment and Plan   1. Lumbar back pain with radiculopathy affecting left lower extremity  Appears to have some pain with radiculopathy which is resolving.  We will obtain an x-ray and he can use ibuprofen intermittently.  Discussed spine care referral if pain returns  - XR Lumbar Spine 2/3 Views; Future    2. Toenail fungus  Discussed treatment options and he would like to defer treatment at this time    3. Hyperlipemia, mixed  I do not think his statin is contributing to his symptoms I would recommend that he continue this    Return in about 4 weeks (around 11/28/2023) for Office visit, if symptoms worsen/fail to improve.     History of Present Illness   This 78 year old man has had about a month of pain in his left calf and left thigh.  It can come on suddenly and he has sharp intense pain.  It is not exertional.  It seems to have gotten better over the past few days.  Ibuprofen has been helpful.       Physical Exam   General Appearance:   No acute distress    /62 (BP Location: Left arm, Patient Position: Sitting, Cuff Size: Adult Large)   Pulse 54   Temp 97.8  F (36.6  C)   Resp 15   Ht 1.88 m (6' 2\")   Wt 86.2 kg (190 lb 1.6 oz)   SpO2 99%   BMI 24.41 kg/m      He has no percussion tenderness over the vertebral bodies.  He has normal strength sensation reflexes  Lower extremities.  Gait is normal.  Toenail fungus     Additional Information   Current Outpatient Medications   Medication Sig Dispense Refill    aspirin 81 MG EC tablet [ASPIRIN 81 MG EC TABLET] Take 81 mg by mouth daily.      atenolol (TENORMIN) 25 MG tablet TAKE 1 TABLET BY MOUTH EVERY DAY 90 tablet 2    cholecalciferol, vitamin D3, (VITAMIN D3) 2,000 unit cap [CHOLECALCIFEROL, VITAMIN D3, (VITAMIN D3) " 2,000 UNIT CAP] Take by mouth daily.      cyanocobalamin (VITAMIN B-12) 1000 MCG tablet Take 1,000 mcg by mouth daily      rosuvastatin (CRESTOR) 10 MG tablet TAKE 1 TABLET (10 MG) BY MOUTH DAILY. 90 tablet 3    tamsulosin (FLOMAX) 0.4 MG capsule TAKE 1 CAPSULE (0.4 MG) BY MOUTH EVERY EVENING 90 capsule 4       Time:      Calixto Mancilla MD  Answers submitted by the patient for this visit:  General Questionnaire (Submitted on 10/31/2023)  Chief Complaint: Chronic problems general questions HPI Form  How many servings of fruits and vegetables do you eat daily?: 2-3  On average, how many sweetened beverages do you drink each day (Examples: soda, juice, sweet tea, etc.  Do NOT count diet or artificially sweetened beverages)?: 0  How many minutes a day do you exercise enough to make your heart beat faster?: 10 to 19  How many days a week do you exercise enough to make your heart beat faster?: 5  How many days per week do you miss taking your medication?: 0  General Concern (Submitted on 10/31/2023)  Chief Complaint: Chronic problems general questions HPI Form  What is the reason for your visit today?: calf pain  When did your symptoms begin?: 1-2 weeks ago

## 2023-11-07 NOTE — PROGRESS NOTES
DISCHARGE  Reason for Discharge: Patient has failed to schedule further appointments.    Discharge Plan: Patient to continue home program.    Referring Provider:  Calixto Mancilla MD       10/06/23 0500   Appointment Info   Signing clinician's name / credentials Cesar Guzman, PT, DPT, CMTPT/DN   Visits Used 1   Medical Diagnosis Poor balance   PT Tx Diagnosis Impaired balance, low back pain with left radicular symptoms.   Quick Adds Certification   Progress Note/Certification   Start of Care Date 10/06/23   Onset of illness/injury or Date of Surgery 09/28/23   Therapy Frequency 1 x/week   Predicted Duration 90 days   Certification date from 10/06/23   Certification date to 01/04/24   Progress Note Due Date 01/04/24   GOALS   PT Goals 2;3   PT Goal 1   Goal Identifier HEP   Goal Description Asael will be independent in their HEP in order to facilitate return to prior level of function.   Goal Progress In progress   Target Date 01/04/24   PT Goal 2   Goal Identifier Centralization of symptoms   Goal Description Asael will demonstrate centralization of their symptoms for >/= four days per week in order to improve their quality of life and improve their ADLs.   Goal Progress To left lateral calf   Target Date 01/04/24   PT Goal 3   Goal Identifier APTA sit to stand   Goal Description Asael will demonstrate improvements in his symptoms as measured by being able to complete five sit to stands without use of his UEs.   Goal Progress 0   Target Date 01/04/24   Subjective Report   Subjective Report See eval   Treatment Interventions (PT)   Interventions Therapeutic Procedure/Exercise   Therapeutic Procedure/Exercise   Therapeutic Procedures: strength, endurance, ROM, flexibillity minutes (49374) 14   Ther Proc 1 LTR   Ther Proc 1 - Details x15 B, increased lumbar pain   Therapeutic Procedures Ther Proc 2;Ther Proc 3;Ther Proc 4   Ther Proc 2 Supine sciatic glide   Ther Proc 2 - Details x15 B   Ther Proc 3 Standing  gastroc stretch   Ther Proc 3 - Details x1' B   Ther Proc 4 Standing lumbar extension   Ther Proc 4 - Details x10   Eval/Assessments   PT Eval, Moderate Complexity Minutes (76159) 26   Plan   Home program PTRx   Plan for next session Assess balance. Progress glute strengthening. Manual as indicated.   Comments   Comments Patient is a 78 year old male with low back pain and radicular pain complaints.  The following significant findings have been identified: Pain, Decreased ROM/flexibility, Decreased joint mobility, and Decreased strength. These impairments interfere with their ability to perform recreational activities and household chores as compared to previous level of function. His primary complaint today was the back pain so balance will be formally assessed next session.   Total Session Time   Timed Code Treatment Minutes 14   Total Treatment Time (sum of timed and untimed services) 40

## 2024-01-31 ENCOUNTER — ANCILLARY PROCEDURE (OUTPATIENT)
Dept: GENERAL RADIOLOGY | Facility: CLINIC | Age: 79
End: 2024-01-31
Attending: INTERNAL MEDICINE
Payer: MEDICARE

## 2024-01-31 ENCOUNTER — NURSE TRIAGE (OUTPATIENT)
Dept: NURSING | Facility: CLINIC | Age: 79
End: 2024-01-31

## 2024-01-31 ENCOUNTER — OFFICE VISIT (OUTPATIENT)
Dept: INTERNAL MEDICINE | Facility: CLINIC | Age: 79
End: 2024-01-31
Payer: MEDICARE

## 2024-01-31 VITALS
BODY MASS INDEX: 24.65 KG/M2 | TEMPERATURE: 99.2 F | DIASTOLIC BLOOD PRESSURE: 65 MMHG | SYSTOLIC BLOOD PRESSURE: 109 MMHG | HEART RATE: 77 BPM | OXYGEN SATURATION: 96 % | RESPIRATION RATE: 24 BRPM | WEIGHT: 192 LBS

## 2024-01-31 DIAGNOSIS — R05.1 ACUTE COUGH: ICD-10-CM

## 2024-01-31 DIAGNOSIS — J06.9 VIRAL UPPER RESPIRATORY TRACT INFECTION: Primary | ICD-10-CM

## 2024-01-31 LAB
FLUAV RNA SPEC QL NAA+PROBE: POSITIVE
FLUBV RNA RESP QL NAA+PROBE: NEGATIVE
RSV RNA SPEC NAA+PROBE: NEGATIVE
SARS-COV-2 RNA RESP QL NAA+PROBE: NEGATIVE

## 2024-01-31 PROCEDURE — 87637 SARSCOV2&INF A&B&RSV AMP PRB: CPT | Performed by: INTERNAL MEDICINE

## 2024-01-31 PROCEDURE — 71046 X-RAY EXAM CHEST 2 VIEWS: CPT | Mod: TC | Performed by: RADIOLOGY

## 2024-01-31 PROCEDURE — 99214 OFFICE O/P EST MOD 30 MIN: CPT | Performed by: INTERNAL MEDICINE

## 2024-01-31 RX ORDER — RESPIRATORY SYNCYTIAL VIRUS VACCINE 120MCG/0.5
0.5 KIT INTRAMUSCULAR ONCE
Qty: 1 EACH | Refills: 0 | Status: CANCELLED | OUTPATIENT
Start: 2024-01-31 | End: 2024-01-31

## 2024-01-31 RX ORDER — CHLORHEXIDINE GLUCONATE ORAL RINSE 1.2 MG/ML
SOLUTION DENTAL
COMMUNITY
Start: 2023-09-20 | End: 2024-01-31

## 2024-01-31 RX ORDER — AMOXICILLIN 500 MG/1
TABLET, FILM COATED ORAL
COMMUNITY
Start: 2023-04-27 | End: 2024-02-09

## 2024-01-31 ASSESSMENT — PAIN SCALES - GENERAL: PAINLEVEL: NO PAIN (0)

## 2024-01-31 ASSESSMENT — ENCOUNTER SYMPTOMS: COUGH: 1

## 2024-01-31 NOTE — COMMUNITY RESOURCES LIST (ENGLISH)
01/31/2024   [facility] [employer]  N/A  For questions about this resource list or additional care needs, please contact your primary care clinic or care manager.  Phone: [phone]   Email: N/A   Address: [address] [place] [id number]   Hours: N/A        Transportation       Free or low-cost transportation  1  [facility]  Office - [facility] - Gas vouchers and transportation assistance - Free or low-cost transportation Distance: 0.42 miles      In-Person, Phone/Virtual   [address] [place] [address]  Language: English  Hours: Mon - Fri 8:00 AM - 12:00 PM , Mon - Fri 1:00 PM - 4:00 PM  Fees: Free   Phone: (534) 939-2302 Email: [email or web address] Website: [email or web address]/social-services-office-[email or web address]     2  [employer] - [facility] Circulator Bus Distance: 6.54 miles      In-Person   [address] [place] [address]  Language: English  Hours: Tue 9:00 AM - 2:00 PM  Fees: Self Pay   Phone: (912) 551-4433 Email: info@[facility] Website: http://[email or web address].[email or web address]/     Transportation to medical appointments  3  [employer] Distance: 3.47 miles      [address] [place] [address]  Language: English, Ivorian  Hours: Mon - Sun 7:00 AM - 5:30 PM  Fees: Self Pay   Phone: (207) 883-9159 Website: http://[email or web address]/     4  [employer] Distance: 7.2 miles      [address] [place] [address]  Language: English, Trinidadian, Hmong, Bermudian, Ivorian  Hours: Mon - Sun 5:00 AM - 10:00 PM  Fees: Insurance, Self Pay   Phone: (559) 641-7863 Email: [employer]_mobility@[employer] Website: https://www.[facility].info/Providers/[employer]/Transportation/1?pos=9          Important Numbers & Websites       Emergency Services   911  City Services   311  Poison Control   (760) 863-8548  Suicide  Prevention Lifeline   (228) 549-6194 (TALK)  Child Abuse Hotline   (750) 932-5431 (4-A-Child)  Sexual Assault Hotline   (422) 319-9406 (HOPE)  National Runaway Safeline   (851) 717-6384 (RUNAWAY)  All-Options Talkline   (148) 926-9762  Substance Abuse Referral   (953) 978-8993 (HELP)

## 2024-01-31 NOTE — TELEPHONE ENCOUNTER
Nurse Triage SBAR    Is this a 2nd Level Triage? NO    Situation: Spouse , Christen, calling to ask about patient getting cough medication for his cough.  Consent: on file in chart    Background: Patient was seen by Dr. Welsh today for an acute cough. Reviewed the following note from visit summary with caller and recommended following provider orders:        Assessment: N/A    Protocol Recommended Disposition:   Home Care    Recommendation: Advised patient to follow provider recommendations and call back if no relief.  Wife verbalized understanding and agreed with plan.       Soledad Gonzalez RN [facility] 1/31/2024 3:19 PM  Reason for Disposition   Caller has medicine question, adult has minor symptoms, caller declines triage, and triager answers question    Protocols used: Medication Question Call-A-OH

## 2024-01-31 NOTE — PATIENT INSTRUCTIONS
Will do viral swab today (Covid, RSV, flu) and a chest XR. Will treat based n results.  Make sure to drink plenty of fluids.  Vital signs look good today: Oxygen, B/p, heart rate.  Can take Mucinex 600 mg twice a day to help cough up mucus from your chest. Try Flonase nasal spray for nasal drainage.

## 2024-02-01 ENCOUNTER — TELEPHONE (OUTPATIENT)
Dept: INTERNAL MEDICINE | Facility: CLINIC | Age: 79
End: 2024-02-01
Payer: MEDICARE

## 2024-02-01 DIAGNOSIS — J10.1 INFLUENZA A: Primary | ICD-10-CM

## 2024-02-01 RX ORDER — OSELTAMIVIR PHOSPHATE 75 MG/1
75 CAPSULE ORAL 2 TIMES DAILY
Qty: 10 CAPSULE | Refills: 0 | Status: SHIPPED | OUTPATIENT
Start: 2024-02-01 | End: 2024-02-06

## 2024-02-01 RX ORDER — CODEINE PHOSPHATE AND GUAIFENESIN 10; 100 MG/5ML; MG/5ML
1-2 SOLUTION ORAL
Qty: 180 ML | Refills: 0 | Status: SHIPPED | OUTPATIENT
Start: 2024-02-01 | End: 2024-02-09

## 2024-02-01 NOTE — TELEPHONE ENCOUNTER
Please let pt know:     Nasal swab is positive for Influenza A. I'm, sending Rx for antiviral medication: tamiflu to his pharmacy. He should start it right away.    Chest XR is negative for pneumonia.    He should also start Flonase nasal spray and Mucinex 600 mg twice a day OTC as discussed.    I's also sending Rx for cough syrup. He should  take it as night only since it will make him sleepy.     In symptoms are not improving or are getting worse by Monday, let Dr Mancilla know.    If his wife has health problems and currently does NOT have URI symptoms, she can request prophylactic Tamiflu Rx from her provider, so that she morrison not get sick from him.

## 2024-02-01 NOTE — TELEPHONE ENCOUNTER
Test Results    She would like to talk to nurse asap as she would like to get information and medications asap.    Who ordered the test:  Dr Welsh    Type of test: Lab    Date of test:  1/31/24   Result Notes          Component  Ref Range & Units 1 d ago 1 yr ago    Influenza A PCR  Negative Positive Abnormal      Influenza B PCR  Negative Negative     RSV PCR  Negative Negative     SARS CoV2 PCR  Negative Negative           Where was the test performed:  [facility]    What are your questions/concerns?:  Wife is wondering if she should be getting medication as well with her . She is on the consent to communicate.      Pharmacy is updated.    Could we send this information to you in ShoutOut or would you prefer to receive a phone call?:   Patient would prefer a phone call     Okay to leave a detailed message?: Yes at Home number on file [phone] (home)

## 2024-02-01 NOTE — TELEPHONE ENCOUNTER
Spoke with patient and his wife Christen and relayed information below from Dr Welsh. They verbalized understanding and agrees with the plan. They have no further questions.

## 2024-02-01 NOTE — TELEPHONE ENCOUNTER
Patient's wife calling back to ask Dr Welsh to send the codeine cough syrup to the pharmacy. Writer informed her that it was already sent. She states she was told by the pharmacy that they did not receive the prescription.    Spoke with pharmacy who states they can have the medication ready in about 1 hour.    Spoke with patient's wife and relayed this information. She verbalized understanding and expresses her thanks. She has no further questions.

## 2024-02-01 NOTE — TELEPHONE ENCOUNTER
Spoke with patient's wife who states she is looking for a prescription for Tamiflu for herself. See her chart for the resolution of this message.

## 2024-02-09 ENCOUNTER — NURSE TRIAGE (OUTPATIENT)
Dept: NURSING | Facility: CLINIC | Age: 79
End: 2024-02-09
Payer: MEDICARE

## 2024-02-09 ENCOUNTER — OFFICE VISIT (OUTPATIENT)
Dept: INTERNAL MEDICINE | Facility: CLINIC | Age: 79
End: 2024-02-09
Payer: MEDICARE

## 2024-02-09 ENCOUNTER — TELEPHONE (OUTPATIENT)
Dept: INTERNAL MEDICINE | Facility: CLINIC | Age: 79
End: 2024-02-09

## 2024-02-09 VITALS
DIASTOLIC BLOOD PRESSURE: 54 MMHG | RESPIRATION RATE: 13 BRPM | HEART RATE: 69 BPM | BODY MASS INDEX: 23.93 KG/M2 | WEIGHT: 186.5 LBS | OXYGEN SATURATION: 95 % | TEMPERATURE: 98.2 F | HEIGHT: 74 IN | SYSTOLIC BLOOD PRESSURE: 108 MMHG

## 2024-02-09 DIAGNOSIS — Z29.11 NEED FOR VACCINATION AGAINST RESPIRATORY SYNCYTIAL VIRUS: ICD-10-CM

## 2024-02-09 DIAGNOSIS — Z23 NEED FOR SHINGLES VACCINE: ICD-10-CM

## 2024-02-09 DIAGNOSIS — J10.1 INFLUENZA A: ICD-10-CM

## 2024-02-09 DIAGNOSIS — Z23 NEED FOR TDAP VACCINATION: ICD-10-CM

## 2024-02-09 DIAGNOSIS — J40 BRONCHITIS: Primary | ICD-10-CM

## 2024-02-09 DIAGNOSIS — J15.9 COMMUNITY ACQUIRED BACTERIAL PNEUMONIA: Primary | ICD-10-CM

## 2024-02-09 PROCEDURE — 99214 OFFICE O/P EST MOD 30 MIN: CPT | Performed by: INTERNAL MEDICINE

## 2024-02-09 RX ORDER — ALBUTEROL SULFATE 0.83 MG/ML
2.5 SOLUTION RESPIRATORY (INHALATION) EVERY 6 HOURS PRN
Qty: 90 ML | Refills: 1 | Status: SHIPPED | OUTPATIENT
Start: 2024-02-09

## 2024-02-09 RX ORDER — CODEINE PHOSPHATE AND GUAIFENESIN 10; 100 MG/5ML; MG/5ML
1-2 SOLUTION ORAL
Qty: 180 ML | Refills: 0 | Status: SHIPPED | OUTPATIENT
Start: 2024-02-09

## 2024-02-09 RX ORDER — DOXYCYCLINE 100 MG/1
100 CAPSULE ORAL 2 TIMES DAILY
Qty: 20 CAPSULE | Refills: 0 | Status: SHIPPED | OUTPATIENT
Start: 2024-02-09

## 2024-02-09 RX ORDER — IPRATROPIUM BROMIDE AND ALBUTEROL SULFATE 2.5; .5 MG/3ML; MG/3ML
1 SOLUTION RESPIRATORY (INHALATION) EVERY 6 HOURS PRN
Qty: 90 ML | Refills: 1 | Status: SHIPPED | OUTPATIENT
Start: 2024-02-09

## 2024-02-09 RX ORDER — LORATADINE 10 MG/1
10 TABLET ORAL DAILY
Qty: 90 TABLET | Refills: 3 | Status: SHIPPED | OUTPATIENT
Start: 2024-02-09

## 2024-02-09 RX ORDER — RESPIRATORY SYNCYTIAL VIRUS VACCINE 120MCG/0.5
0.5 KIT INTRAMUSCULAR ONCE
Qty: 1 EACH | Refills: 0 | Status: CANCELLED | OUTPATIENT
Start: 2024-02-09 | End: 2024-02-09

## 2024-02-09 NOTE — TELEPHONE ENCOUNTER
Spoke with patient and relayed information below from Dr Mancilla. Patient verbalized understanding and states he does not feel like OTC cough suppressants and Mucinex are helping. States he also does not feel like the codeine cough syrup is helping much either. He wants to know if there is any other option to help with his cough.

## 2024-02-09 NOTE — TELEPHONE ENCOUNTER
It sounds like he is doing all the right things.  He should monitor for increased rate of breathing or feeling short of breath, new fever.  He can use acetaminophen 1 g 3 times a day to help with some of the musculoskeletal symptoms and continue with Robitussin and codeine as a cough suppressant as well as Mucinex.

## 2024-02-09 NOTE — TELEPHONE ENCOUNTER
Nurse Triage SBAR    Is this a 2nd Level Triage? NO    Situation:   Spouse Christen calling (consent to communicate is on file). Reporting frequent coughing spasms.    Background:   Diagnosed with influenza A 1/31/24    Assessment:     Spouse reporting coughing spasms, frequency are increasing since he was diagnosed with influenza 2 weeks ago.  Chest x-ray on 1/31/24 negative.   Difficulty sleeping. Frequent coughing throughout day.  Afebrile.  Taking Mucinex during the day and Robitussin with Codeine at night with no improvement.  Reports hearing mucus with cough, patient has not coughed up sputum.  Patient denies any shortness of breath or chest pain.  Spouse stating he is sleeping more, increased fatigue.  Patient is eating and drinking fluids.    Protocol Recommended Disposition:   See today or tomorrow in clinic. Transferred to Central Scheduling.      Reason for Disposition   Patient wants to be seen    Additional Information   Negative: SEVERE difficulty breathing (e.g., struggling for each breath, speaks in single words)   Negative: Bluish (or gray) lips or face   Negative: Shock suspected (e.g., cold/pale/clammy skin, too weak to stand, low BP, rapid pulse)   Negative: Sounds like a life-threatening emergency to the triager   Negative: Chest pain  (Exception: MILD central chest pain, present only when coughing.)   Negative: Headache and stiff neck (can't touch chin to chest)   Negative: Difficulty breathing that is not severe and not relieved by cleaning out the nose   Negative: Patient sounds very sick or weak to the triager   Negative: Fever > 104 F (40 C)   Negative: Fever present > 3 days (72 hours)   Negative: Fever returns after gone for over 24 hours and symptoms worse or not improved   Negative: Using nasal washes and pain medicine > 24 hours and sinus pain (around cheekbone or eye) persists   Negative: Earache    Protocols used: Influenza (Flu) Follow-Up Call-A-OH

## 2024-02-09 NOTE — PROGRESS NOTES
Assessment & Plan     Need for shingles vaccine      Need for Tdap vaccination      Need for vaccination against respiratory syncytial virus  Discussed     Community acquired bacterial pneumonia  Persistent cough with some right basal creps in a patient with influenza ,not treated with tamiflu and initial negative chest xray   This could be a normal post infectious cough , and recovery after influenza in his age group can be long   But due to lung findings will empirically treat with doxycyline   Cough is harder to treat as I Did not hear a wheeze , would avoid prednisone with no clear cut wheeze, or COPD/asthma  history . Trial of nebulizer and ongoing codeine syrup use plus antihistamine   If cough persists needs repeat imaging with CT scan   - doxycycline hyclate (VIBRAMYCIN) 100 MG capsule; Take 1 capsule (100 mg) by mouth 2 times daily  - ipratropium - albuterol 0.5 mg/2.5 mg/3 mL (DUONEB) 0.5-2.5 (3) MG/3ML neb solution; Take 1 vial (3 mLs) by nebulization every 6 hours as needed for shortness of breath, wheezing or cough  - loratadine (CLARITIN) 10 MG tablet; Take 1 tablet (10 mg) by mouth daily    Influenza A    - guaiFENesin-codeine (ROBITUSSIN AC) 100-10 MG/5ML solution; Take 5-10 mLs by mouth nightly as needed for cough                  Subjective   Asael is a 78 year old, presenting for the following health issues:  Creatinine   Date Value Ref Range Status   09/28/2023 0.94 0.67 - 1.17 mg/dL Final       Flu (Positive influenza A on 01/31, chest xray negative. Still having symptoms that are not managed by current medications and wanted to be seen. Nurse triage encounter from earlier today.)      2/9/2024    12:10 PM   Additional Questions   Roomed by ROXIE Sams   Accompanied by wife- Christen     History of Present Illness       Reason for visit:  Cough, stuffiness    He eats 0-1 servings of fruits and vegetables daily.He consumes 0 sweetened beverage(s) daily.He exercises with enough effort to  "increase his heart rate 9 or less minutes per day.  He exercises with enough effort to increase his heart rate 4 days per week.   He is taking medications regularly.                     Objective    /54 (BP Location: Left arm, Patient Position: Sitting, Cuff Size: Adult Regular)   Pulse 69   Temp 98.2  F (36.8  C) (Tympanic)   Resp 13   Ht 1.88 m (6' 2\")   Wt 84.6 kg (186 lb 8 oz)   SpO2 95%   BMI 23.95 kg/m    Body mass index is 23.95 kg/m .  Physical Exam   GENERAL: alert and no distress  NECK: no adenopathy, no asymmetry, masses, or scars  RESP: lungs clear to auscultation - no rales, rhonchi or wheezes except coarse creps   CV: regular rate and rhythm, normal S1 S2, no S3 or S4, no murmur, click or rub, no peripheral edema  ABDOMEN: soft, nontender, no hepatosplenomegaly, no masses and bowel sounds normal  MS: no gross musculoskeletal defects noted, no edema            Signed Electronically by: Gris Altamirano MD    "

## 2024-02-09 NOTE — TELEPHONE ENCOUNTER
2nd request from pharmacy is faxing back and please review as Alternative was not sent to [facility].        Pt saw Dr Altamirano     Pharmacy sent fax and requesting either an alternative medication or if needing this one then we need to forward to the Prior Auth dept.    Pharmacy is updated     ipratropium - albuterol 0.5 mg/2.5 mg/3 mL (DUONEB) 0.5-2.5 (3) MG/3ML neb solution 90 mL 1 2/9/2024 -- No   Sig - Route: Take 1 vial (3 mLs) by nebulization every 6 hours as needed for shortness of breath, wheezing or cough - Nebulization   Sent to pharmacy as: Ipratropium-Albuterol 0.5-2.5 (3) MG/3ML Inhalation Solution (DUONEB)   Class: E-Prescribe   Order: [id number]   E-Prescribing Status: Receipt confirmed by pharmacy (2/9/2024 12:46 PM CST)

## 2024-02-09 NOTE — TELEPHONE ENCOUNTER
Spoke with patient's wife Mariely (CTC on file) who states the patient could not remember any of the directions that were discussed earlier. Writer relayed all directions below from Dr Mancilla. Mariely states she did call back and was able to schedule a visit with Dr Altamirano for further evaluation and recommendations. Writer advised that any new medications could be discussed at that visit. Writer also recommended that they wear masks to the appointment due to the cough. Mariely verbalized understanding and agrees with the plan. She has no further questions and will follow up with Dr Altamirano later today.

## 2024-02-12 ENCOUNTER — TELEPHONE (OUTPATIENT)
Dept: INTERNAL MEDICINE | Facility: CLINIC | Age: 79
End: 2024-02-12
Payer: MEDICARE

## 2024-02-12 NOTE — TELEPHONE ENCOUNTER
PA Initiation    Medication: ALBUTEROL SULFATE (2.5 MG/3ML) 0.083% IN NEBU  Insurance Company: [employer] - Phone [phone] Fax [phone]  Pharmacy Filling the Rx: [id number] - [place], [place] - [address]  Filling Pharmacy Phone: [phone]  Filling Pharmacy Fax:    Start Date: 2/12/2024  Retail Pharmacy Prior Authorization Team   Phone: [phone]

## 2024-02-12 NOTE — TELEPHONE ENCOUNTER
Attempted to call pt, no answer (1/3). Left message requesting pt to call back clinic. See recent PCP note for this encounter. PA encounter for medication started 2/12. See telephone encounter for details     When patient returns call please inform of PA process and to follow up with clinic with any breathing concerns as we wait for PA.

## 2024-02-12 NOTE — TELEPHONE ENCOUNTER
PRIOR AUTHORIZATION DENIED    Medication: ALBUTEROL SULFATE (2.5 MG/3ML) 0.083% IN NEBU  Insurance Company: [employer] - Phone [phone] Fax [phone]  Denial Date: 2/12/2024  Denial Reason(s): Must be billed to Part B  Appeal Information: None available.  Patient Notified: Pharmacy will notify patient.

## 2024-02-12 NOTE — TELEPHONE ENCOUNTER
I did send it in as you can see by the date but it is being held up by PA please let pt and pharamcy know     albuterol (PROVENTIL) (2.5 MG/3ML) 0.083% neb solution  2.5 mg, EVERY 6 HOURS PRN           Summary: Take 1 vial (2.5 mg) by nebulization every 6 hours as needed for shortness of breath, wheezing or cough, Disp-90 mL, R-1, E-Prescribe  Dose, Route, Frequency: 2.5 mg, Nebulization, EVERY 6 HOURS PRNStart: 02/09/2024Ord/Sold: 02/09/2024 (O)Ordered On: 02/09/2024Pharmacy: [id number] - [place], [place] - [address]: Taking: Long-term: Med Note:              Change  Adjust Sig  Patient Sig: Take 1 vial (2.5 mg) by nebulization every 6 hours as needed for shortness of breath, wheezing or cough  Authorized By: Gris Altamirano MD  Dispense: 90 mL  Refills: 1 ordered  Prior Authorization: Pending   Enter Details  Cancel  Change Payer  Dose History

## 2024-02-12 NOTE — TELEPHONE ENCOUNTER
Dr. Velez, please escalate.    I am not sure, what went wrong.This medication is not a special med to be held or I do not think [facility] orders that need PA require a release from another central group.   Usually, when we know a med needs PA we send it to that PA group. Some pharmacies they let us know we need to do PA also.  I am not sure of the lag time between ordering time, and pharmacy receiving time of a med that needs a PA.     Thanks,  Wally

## 2024-03-10 DIAGNOSIS — I25.10 ATHEROSCLEROSIS OF NATIVE CORONARY ARTERY OF NATIVE HEART WITHOUT ANGINA PECTORIS: ICD-10-CM

## 2024-03-11 RX ORDER — ROSUVASTATIN CALCIUM 10 MG/1
10 TABLET, COATED ORAL DAILY
Qty: 90 TABLET | Refills: 1 | Status: SHIPPED | OUTPATIENT
Start: 2024-03-11 | End: 2024-09-04

## 2024-03-27 ENCOUNTER — TRANSFERRED RECORDS (OUTPATIENT)
Dept: HEALTH INFORMATION MANAGEMENT | Facility: CLINIC | Age: 79
End: 2024-03-27
Payer: MEDICARE

## 2024-04-25 ENCOUNTER — TRANSFERRED RECORDS (OUTPATIENT)
Dept: HEALTH INFORMATION MANAGEMENT | Facility: CLINIC | Age: 79
End: 2024-04-25
Payer: MEDICARE

## 2024-07-15 DIAGNOSIS — I25.10 ATHEROSCLEROSIS OF NATIVE CORONARY ARTERY OF NATIVE HEART WITHOUT ANGINA PECTORIS: ICD-10-CM

## 2024-07-15 RX ORDER — ATENOLOL 25 MG/1
TABLET ORAL
Qty: 90 TABLET | Refills: 1 | Status: SHIPPED | OUTPATIENT
Start: 2024-07-15

## 2024-08-29 ENCOUNTER — PATIENT OUTREACH (OUTPATIENT)
Dept: CARE COORDINATION | Facility: CLINIC | Age: 79
End: 2024-08-29
Payer: MEDICARE

## 2024-09-04 DIAGNOSIS — I25.10 ATHEROSCLEROSIS OF NATIVE CORONARY ARTERY OF NATIVE HEART WITHOUT ANGINA PECTORIS: ICD-10-CM

## 2024-09-04 RX ORDER — ROSUVASTATIN CALCIUM 10 MG/1
10 TABLET, COATED ORAL DAILY
Qty: 90 TABLET | Refills: 1 | Status: SHIPPED | OUTPATIENT
Start: 2024-09-04

## 2024-09-12 ENCOUNTER — PATIENT OUTREACH (OUTPATIENT)
Dept: CARE COORDINATION | Facility: CLINIC | Age: 79
End: 2024-09-12
Payer: MEDICARE

## 2024-10-21 ENCOUNTER — ALLIED HEALTH/NURSE VISIT (OUTPATIENT)
Dept: FAMILY MEDICINE | Facility: CLINIC | Age: 79
End: 2024-10-21
Payer: MEDICARE

## 2024-10-21 DIAGNOSIS — Z23 ENCOUNTER FOR IMMUNIZATION: Primary | ICD-10-CM

## 2024-10-21 PROCEDURE — G0008 ADMIN INFLUENZA VIRUS VAC: HCPCS

## 2024-10-21 PROCEDURE — 90662 IIV NO PRSV INCREASED AG IM: CPT

## 2024-10-21 PROCEDURE — 99207 PR NO CHARGE NURSE ONLY: CPT

## 2024-12-02 SDOH — HEALTH STABILITY: PHYSICAL HEALTH: ON AVERAGE, HOW MANY DAYS PER WEEK DO YOU ENGAGE IN MODERATE TO STRENUOUS EXERCISE (LIKE A BRISK WALK)?: 2 DAYS

## 2024-12-02 SDOH — HEALTH STABILITY: PHYSICAL HEALTH: ON AVERAGE, HOW MANY MINUTES DO YOU ENGAGE IN EXERCISE AT THIS LEVEL?: 30 MIN

## 2024-12-02 ASSESSMENT — SOCIAL DETERMINANTS OF HEALTH (SDOH): HOW OFTEN DO YOU GET TOGETHER WITH FRIENDS OR RELATIVES?: TWICE A WEEK

## 2024-12-03 ENCOUNTER — TELEPHONE (OUTPATIENT)
Dept: INTERNAL MEDICINE | Facility: CLINIC | Age: 79
End: 2024-12-03

## 2024-12-03 ENCOUNTER — OFFICE VISIT (OUTPATIENT)
Dept: INTERNAL MEDICINE | Facility: CLINIC | Age: 79
End: 2024-12-03
Payer: MEDICARE

## 2024-12-03 VITALS
HEIGHT: 74 IN | SYSTOLIC BLOOD PRESSURE: 106 MMHG | BODY MASS INDEX: 24.81 KG/M2 | HEART RATE: 52 BPM | DIASTOLIC BLOOD PRESSURE: 65 MMHG | WEIGHT: 193.3 LBS | OXYGEN SATURATION: 99 % | TEMPERATURE: 97.4 F | RESPIRATION RATE: 16 BRPM

## 2024-12-03 DIAGNOSIS — I25.10 ATHEROSCLEROSIS OF NATIVE CORONARY ARTERY OF NATIVE HEART WITHOUT ANGINA PECTORIS: ICD-10-CM

## 2024-12-03 DIAGNOSIS — N13.8 BPH WITH URINARY OBSTRUCTION: ICD-10-CM

## 2024-12-03 DIAGNOSIS — Z86.0100 HISTORY OF COLONIC POLYPS: ICD-10-CM

## 2024-12-03 DIAGNOSIS — E78.2 HYPERLIPEMIA, MIXED: ICD-10-CM

## 2024-12-03 DIAGNOSIS — Z12.5 SCREENING FOR PROSTATE CANCER: ICD-10-CM

## 2024-12-03 DIAGNOSIS — J31.0 CHRONIC RHINITIS: ICD-10-CM

## 2024-12-03 DIAGNOSIS — R31.29 MICROSCOPIC HEMATURIA: ICD-10-CM

## 2024-12-03 DIAGNOSIS — Z00.00 ANNUAL PHYSICAL EXAM: Primary | ICD-10-CM

## 2024-12-03 DIAGNOSIS — N40.1 BPH WITH URINARY OBSTRUCTION: ICD-10-CM

## 2024-12-03 PROBLEM — R26.89 POOR BALANCE: Status: RESOLVED | Noted: 2023-10-06 | Resolved: 2024-12-03

## 2024-12-03 LAB
ALBUMIN SERPL BCG-MCNC: 4.4 G/DL (ref 3.5–5.2)
ALBUMIN UR-MCNC: NEGATIVE MG/DL
ALP SERPL-CCNC: 68 U/L (ref 40–150)
ALT SERPL W P-5'-P-CCNC: 13 U/L (ref 0–70)
ANION GAP SERPL CALCULATED.3IONS-SCNC: 7 MMOL/L (ref 7–15)
APPEARANCE UR: CLEAR
AST SERPL W P-5'-P-CCNC: 19 U/L (ref 0–45)
BACTERIA #/AREA URNS HPF: ABNORMAL /HPF
BILIRUB SERPL-MCNC: 0.8 MG/DL
BILIRUB UR QL STRIP: NEGATIVE
BUN SERPL-MCNC: 20.3 MG/DL (ref 8–23)
CALCIUM SERPL-MCNC: 9.2 MG/DL (ref 8.8–10.4)
CHLORIDE SERPL-SCNC: 105 MMOL/L (ref 98–107)
CHOLEST SERPL-MCNC: 115 MG/DL
COLOR UR AUTO: YELLOW
CREAT SERPL-MCNC: 1.05 MG/DL (ref 0.67–1.17)
EGFRCR SERPLBLD CKD-EPI 2021: 72 ML/MIN/1.73M2
ERYTHROCYTE [DISTWIDTH] IN BLOOD BY AUTOMATED COUNT: 13.5 % (ref 10–15)
FASTING STATUS PATIENT QL REPORTED: YES
FASTING STATUS PATIENT QL REPORTED: YES
GLUCOSE SERPL-MCNC: 115 MG/DL (ref 70–99)
GLUCOSE UR STRIP-MCNC: NEGATIVE MG/DL
HCO3 SERPL-SCNC: 28 MMOL/L (ref 22–29)
HCT VFR BLD AUTO: 43.3 % (ref 40–53)
HDLC SERPL-MCNC: 42 MG/DL
HGB BLD-MCNC: 14.4 G/DL (ref 13.3–17.7)
HGB UR QL STRIP: ABNORMAL
KETONES UR STRIP-MCNC: NEGATIVE MG/DL
LDLC SERPL CALC-MCNC: 56 MG/DL
LEUKOCYTE ESTERASE UR QL STRIP: NEGATIVE
MCH RBC QN AUTO: 29.1 PG (ref 26.5–33)
MCHC RBC AUTO-ENTMCNC: 33.3 G/DL (ref 31.5–36.5)
MCV RBC AUTO: 88 FL (ref 78–100)
MUCOUS THREADS #/AREA URNS LPF: PRESENT /LPF
NITRATE UR QL: NEGATIVE
NONHDLC SERPL-MCNC: 73 MG/DL
PH UR STRIP: 5.5 [PH] (ref 5–8)
PLATELET # BLD AUTO: 175 10E3/UL (ref 150–450)
POTASSIUM SERPL-SCNC: 4.9 MMOL/L (ref 3.4–5.3)
PROT SERPL-MCNC: 6.7 G/DL (ref 6.4–8.3)
PSA SERPL DL<=0.01 NG/ML-MCNC: 2.13 NG/ML (ref 0–6.5)
RBC # BLD AUTO: 4.95 10E6/UL (ref 4.4–5.9)
RBC #/AREA URNS AUTO: ABNORMAL /HPF
SODIUM SERPL-SCNC: 140 MMOL/L (ref 135–145)
SP GR UR STRIP: 1.02 (ref 1–1.03)
TRIGL SERPL-MCNC: 85 MG/DL
UROBILINOGEN UR STRIP-ACNC: 1 E.U./DL
WBC # BLD AUTO: 7.4 10E3/UL (ref 4–11)
WBC #/AREA URNS AUTO: ABNORMAL /HPF

## 2024-12-03 PROCEDURE — 36415 COLL VENOUS BLD VENIPUNCTURE: CPT | Performed by: INTERNAL MEDICINE

## 2024-12-03 PROCEDURE — 99214 OFFICE O/P EST MOD 30 MIN: CPT | Mod: 25 | Performed by: INTERNAL MEDICINE

## 2024-12-03 PROCEDURE — 85027 COMPLETE CBC AUTOMATED: CPT | Performed by: INTERNAL MEDICINE

## 2024-12-03 PROCEDURE — G0103 PSA SCREENING: HCPCS | Performed by: INTERNAL MEDICINE

## 2024-12-03 PROCEDURE — 81001 URINALYSIS AUTO W/SCOPE: CPT | Performed by: INTERNAL MEDICINE

## 2024-12-03 PROCEDURE — 80061 LIPID PANEL: CPT | Performed by: INTERNAL MEDICINE

## 2024-12-03 PROCEDURE — G0439 PPPS, SUBSEQ VISIT: HCPCS | Performed by: INTERNAL MEDICINE

## 2024-12-03 PROCEDURE — 80053 COMPREHEN METABOLIC PANEL: CPT | Performed by: INTERNAL MEDICINE

## 2024-12-03 RX ORDER — FLUTICASONE PROPIONATE 50 MCG
1 SPRAY, SUSPENSION (ML) NASAL DAILY
Qty: 16 G | Refills: 5 | Status: SHIPPED | OUTPATIENT
Start: 2024-12-03

## 2024-12-03 ASSESSMENT — PAIN SCALES - GENERAL: PAINLEVEL_OUTOF10: NO PAIN (0)

## 2024-12-03 NOTE — PATIENT INSTRUCTIONS
Patient Education   Preventive Care Advice   This is general advice given by our system to help you stay healthy. However, your care team may have specific advice just for you. Please talk to your care team about your preventive care needs.  Nutrition  Eat 5 or more servings of fruits and vegetables each day.  Try wheat bread, brown rice and whole grain pasta (instead of white bread, rice, and pasta).  Get enough calcium and vitamin D. Check the label on foods and aim for 100% of the RDA (recommended daily allowance).  Lifestyle  Exercise at least 150 minutes each week  (30 minutes a day, 5 days a week).  Do muscle strengthening activities 2 days a week. These help control your weight and prevent disease.  No smoking.  Wear sunscreen to prevent skin cancer.  Have a dental exam and cleaning every 6 months.  Yearly exams  See your health care team every year to talk about:  Any changes in your health.  Any medicines your care team has prescribed.  Preventive care, family planning, and ways to prevent chronic diseases.  Shots (vaccines)   HPV shots (up to age 26), if you've never had them before.  Hepatitis B shots (up to age 59), if you've never had them before.  COVID-19 shot: Get this shot when it's due.  Flu shot: Get a flu shot every year.  Tetanus shot: Get a tetanus shot every 10 years.  Pneumococcal, hepatitis A, and RSV shots: Ask your care team if you need these based on your risk.  Shingles shot (for age 50 and up)  General health tests  Diabetes screening:  Starting at age 35, Get screened for diabetes at least every 3 years.  If you are younger than age 35, ask your care team if you should be screened for diabetes.  Cholesterol test: At age 39, start having a cholesterol test every 5 years, or more often if advised.  Bone density scan (DEXA): At age 50, ask your care team if you should have this scan for osteoporosis (brittle bones).  Hepatitis C: Get tested at least once in your life.  STIs (sexually  transmitted infections)  Before age 24: Ask your care team if you should be screened for STIs.  After age 24: Get screened for STIs if you're at risk. You are at risk for STIs (including HIV) if:  You are sexually active with more than one person.  You don't use condoms every time.  You or a partner was diagnosed with a sexually transmitted infection.  If you are at risk for HIV, ask about PrEP medicine to prevent HIV.  Get tested for HIV at least once in your life, whether you are at risk for HIV or not.  Cancer screening tests  Cervical cancer screening: If you have a cervix, begin getting regular cervical cancer screening tests starting at age 21.  Breast cancer scan (mammogram): If you've ever had breasts, begin having regular mammograms starting at age 40. This is a scan to check for breast cancer.  Colon cancer screening: It is important to start screening for colon cancer at age 45.  Have a colonoscopy test every 10 years (or more often if you're at risk) Or, ask your provider about stool tests like a FIT test every year or Cologuard test every 3 years.  To learn more about your testing options, visit:   .  For help making a decision, visit:   https://bit.ly/lx29889.  Prostate cancer screening test: If you have a prostate, ask your care team if a prostate cancer screening test (PSA) at age 55 is right for you.  Lung cancer screening: If you are a current or former smoker ages 50 to 80, ask your care team if ongoing lung cancer screenings are right for you.  For informational purposes only. Not to replace the advice of your health care provider. Copyright   2023 [facility]. All rights reserved. Clinically reviewed by the [facility]. [employer] 137136 - REV 01/24.  Substance Use Disorder: Care Instructions  Overview     You can improve your life and health by stopping your use of alcohol or drugs. When you don't drink or use drugs, you may feel and sleep better. You may  get along better with your family, friends, and coworkers. There are medicines and programs that can help with substance use disorder.  How can you care for yourself at home?  Here are some ways to help you stay sober and prevent relapse.  If you have been given medicine to help keep you sober or reduce your cravings, be sure to take it exactly as prescribed.  Talk to your doctor about programs that can help you stop using drugs or drinking alcohol.  Do not keep alcohol or drugs in your home.  Plan ahead. Think about what you'll say if other people ask you to drink or use drugs. Try not to spend time with people who drink or use drugs.  Use the time and money spent on drinking or drugs to do something that's important to you.  Preventing a relapse  Have a plan to deal with relapse. Learn to recognize changes in your thinking that lead you to drink or use drugs. Get help before you start to drink or use drugs again.  Try to stay away from situations, friends, or places that may lead you to drink or use drugs.  If you feel the need to drink alcohol or use drugs again, seek help right away. Call a trusted friend or family member. Some people get support from organizations such as Narcotics Anonymous or [employer] or from treatment facilities.  If you relapse, get help as soon as you can. Some people make a plan with another person that outlines what they want that person to do for them if they relapse. The plan usually includes how to handle the relapse and who to notify in case of relapse.  Don't give up. Remember that a relapse doesn't mean that you have failed. Use the experience to learn the triggers that lead you to drink or use drugs. Then quit again. Recovery is a lifelong process. Many people have several relapses before they are able to quit for good.  Follow-up care is a key part of your treatment and safety. Be sure to make and go to all appointments, and call your doctor if you are having problems. It's  "also a good idea to know your test results and keep a list of the medicines you take.  When should you call for help?   Call 911  anytime you think you may need emergency care. For example, call if you or someone else:    Has overdosed or has withdrawal signs. Be sure to tell the emergency workers that you are or someone else is using or trying to quit using drugs. Overdose or withdrawal signs may include:  Losing consciousness.  Seizure.  Seeing or hearing things that aren't there (hallucinations).     Is thinking or talking about suicide or harming others.   Where to get help 24 hours a day, 7 days a week   If you or someone you know talks about suicide, self-harm, a mental health crisis, a substance use crisis, or any other kind of emotional distress, get help right away. You can:    Call the Suicide and Crisis Lifeline at 988.     Call 9-362-221-TALK (1-308.905.5098).     Text HOME to 199132 to access the Crisis Text Line.   Consider saving these numbers in your phone.  Go to Eachbaby for more information or to chat online.  Call your doctor now or seek immediate medical care if:    You are having withdrawal symptoms. These may include nausea or vomiting, sweating, shakiness, and anxiety.   Watch closely for changes in your health, and be sure to contact your doctor if:    You have a relapse.     You need more help or support to stop.   Where can you learn more?  Go to https://www.ididwork.net/patiented  Enter H573 in the search box to learn more about \"Substance Use Disorder: Care Instructions.\"  Current as of: November 15, 2023  Content Version: 14.2 2024 [facility] [employer].   Care instructions adapted under license by your healthcare professional. If you have questions about a medical condition or this instruction, always ask your healthcare professional. [employer] disclaims any warranty or liability for your use of this information.       "

## 2024-12-03 NOTE — PROGRESS NOTES
Preventive Care Visit  [facility]  Calixto Mancilla MD, Internal Medicine  Dec 3, 2024      1. Annual physical exam (Primary)  This is a 79-year-old man with issues as discussed below.  Defers immunizations.    2. Screening for prostate cancer  - Prostate Specific Antigen Screen; Future  - Prostate Specific Antigen Screen    3. History of colonic polyps  Had a colonoscopy this year and no further colonoscopies for screening purposes recommended    4. Atherosclerosis of native coronary artery of native heart without angina pectoris  Continue secondary prevention    5. Hyperlipemia, mixed  Continue statin  - CBC with platelets; Future  - Comprehensive metabolic panel; Future  - Lipid panel reflex to direct LDL Fasting; Future  - UA Macroscopic with reflex to Microscopic and Culture; Future  - CBC with platelets  - Comprehensive metabolic panel  - Lipid panel reflex to direct LDL Fasting  - UA Macroscopic with reflex to Microscopic and Culture  - UA Microscopic with Reflex to Culture    6. BPH with urinary obstruction  Continue Flomax    7. Microscopic hematuria  This is a new finding today and I have communicated with him through MyChart recommendations as below  - CT Urogram wo & w Contrast; Future  - Adult Urology  Referral; Future    8. Chronic rhinitis  - fluticasone (FLONASE) 50 MCG/ACT nasal spray; Spray 1 spray into both nostrils daily.  Dispense: 16 g; Refill: 5      Subjective   Asael is a 79 year old, presenting for the following:  Physical (Patient reports they are here for annual physical. )        12/3/2024     7:15 AM   Additional Questions   Roomed by Jackelyn   Accompanied by alone         12/3/2024     7:15 AM   Patient Reported Additional Medications   Patient reports taking the following new medications none           HPI  Health Care Directive  Patient does not have a Health Care Directive: Discussed advance care planning with patient; information given to patient to  review.      12/2/2024   General Health   How would you rate your overall physical health? Excellent   Feel stress (tense, anxious, or unable to sleep) Not at all            12/2/2024   Nutrition   Diet: Regular (no restrictions)            12/2/2024   Exercise   Days per week of moderate/strenous exercise 2 days   Average minutes spent exercising at this level 30 min      (!) EXERCISE CONCERN      12/2/2024   Social Factors   Frequency of gathering with friends or relatives Twice a week   Worry food won't last until get money to buy more No   Food not last or not have enough money for food? No   Do you have housing? (Housing is defined as stable permanent housing and does not include staying ouside in a car, in a tent, in an abandoned building, in an overnight shelter, or couch-surfing.) Yes   Are you worried about losing your housing? No   Lack of transportation? No   Unable to get utilities (heat,electricity)? No            12/3/2024   Fall Risk   Fallen 2 or more times in the past year? No    Trouble with walking or balance? No        Patient-reported          12/2/2024   Activities of Daily Living- Home Safety   Needs help with the following daily activites None of the above   Safety concerns in the home None of the above            12/2/2024   Dental   Dentist two times every year? Yes            12/2/2024   Hearing Screening   Hearing concerns? None of the above            12/2/2024   Driving Risk Screening   Patient/family members have concerns about driving No            12/2/2024   General Alertness/Fatigue Screening   Have you been more tired than usual lately? No            12/2/2024   Urinary Incontinence Screening   Bothered by leaking urine in past 6 months No            12/2/2024   TB Screening   Were you born outside of the [place]? No            Today's PHQ-2 Score:       12/3/2024     7:12 AM   PHQ-2 ( 1999 [employer])   Q1: Little interest or pleasure in doing things 0    Q2: Feeling down, depressed or  hopeless 0    PHQ-2 Score 0    Q1: Little interest or pleasure in doing things Not at all   Q2: Feeling down, depressed or hopeless Not at all   PHQ-2 Score 0       Patient-reported           2024   Substance Use   Alcohol more than 3/day or more than 7/wk No   Do you have a current opioid prescription? No   How severe/bad is pain from 1 to 10? 0/10 (No Pain)   Do you use any other substances recreationally? No    (!) PRESCRIPTION DRUGS       Multiple values from one day are sorted in reverse-chronological order     Social History     Tobacco Use    Smoking status: Former     Current packs/day: 0.00     Types: Cigarettes     Quit date: 2000     Years since quittin.9    Smokeless tobacco: Never   Vaping Use    Vaping status: Never Used   Substance Use Topics    Alcohol use: Yes     Comment: rare    Drug use: No       ASCVD Risk   The ASCVD Risk score (Alisia DANIELS, et al., 2019) failed to calculate for the following reasons:    The valid total cholesterol range is 130 to 320 mg/dL        Reviewed and updated as needed this visit by Provider                    Current providers sharing in care for this patient include:  Patient Care Team:  Calixto Mancilla MD as PCP - General (Internal Medicine)  Calixto Mancilla MD as Assigned PCP    The following health maintenance items are reviewed in Epic and correct as of today:  Health Maintenance   Topic Date Due    ZOSTER IMMUNIZATION (1 of 2) Never done    DTAP/TDAP/TD IMMUNIZATION (3 - Td or Tdap) 2019    RSV VACCINE (1 - 1-dose 75+ series) Never done    COVID-19 Vaccine ( season) 2024    MEDICARE ANNUAL WELLNESS VISIT  2024    LIPID  2024    ANNUAL REVIEW OF HM ORDERS  2024    FALL RISK ASSESSMENT  2025    GLUCOSE  2026    ADVANCE CARE PLANNING  2028    PHQ-2 (once per calendar year)  Completed    INFLUENZA VACCINE  Completed    Pneumococcal Vaccine: 65+ Years  Completed    HPV IMMUNIZATION   "Aged Out    MENINGITIS IMMUNIZATION  Aged Out    RSV MONOCLONAL ANTIBODY  Aged Out    HEPATITIS C SCREENING  Discontinued    COLORECTAL CANCER SCREENING  Discontinued          Objective    Exam  /65 (BP Location: Left arm, Patient Position: Sitting, Cuff Size: Adult Regular)   Pulse 52   Temp 97.4  F (36.3  C) (Tympanic)   Resp 16   Ht 1.88 m (6' 2\")   Wt 87.7 kg (193 lb 4.8 oz)   SpO2 99%   BMI 24.82 kg/m     Estimated body mass index is 24.82 kg/m  as calculated from the following:    Height as of this encounter: 1.88 m (6' 2\").    Weight as of this encounter: 87.7 kg (193 lb 4.8 oz).    Physical Exam  EYES: Eyelids, conjunctiva, and sclera were normal.   HEAD, EARS, NOSE, MOUTH, AND THROAT: Head and face were normal. Hearing was normal to voice and the ears were normal to external exam. Nose appearance was normal and there was no discharge.   NECK: Neck appearance was normal. .  RESPIRATORY: Breathing pattern was normal and the chest moved symmetrically.    Lung sounds were normal and there were no abnormal sounds.  CARDIOVASCULAR: Heart rate and rhythm were normal.  S1 and S2 were normal and there were no extra sounds or murmurs. There was no peripheral edema.  GASTROINTESTINAL: The abdomen was normal in contour.   NEUROLOGIC: The patient was alert and oriented to person, place, time, and circumstance. Speech was normal. Cranial nerves were normal. Motor strength was normal for age. The patient was normally coordinated.  PSYCHIATRIC:  Mood and affect were normal and the patient had normal recent and remote memory. The patient's judgment and insight were normal.      12/3/2024   Mini Cog   Clock Draw Score 2 Normal   3 Item Recall 3 objects recalled   Mini Cog Total Score 5                 Signed Electronically by: Calixto Mancilla MD    "

## 2024-12-03 NOTE — TELEPHONE ENCOUNTER
Wife calls to inquire about CT urogram order and urology referral. Relayed message on lab results from [facility] this AM:        They are understanding and will call to schedule.

## 2024-12-10 ENCOUNTER — HOSPITAL ENCOUNTER (OUTPATIENT)
Dept: CT IMAGING | Facility: CLINIC | Age: 79
Discharge: HOME | End: 2024-12-10
Attending: INTERNAL MEDICINE
Payer: MEDICARE

## 2024-12-10 DIAGNOSIS — R31.29 MICROSCOPIC HEMATURIA: ICD-10-CM

## 2024-12-10 PROCEDURE — 250N000011 HC RX IP 250 OP 636: Performed by: INTERNAL MEDICINE

## 2024-12-10 PROCEDURE — G1010 CDSM STANSON: HCPCS

## 2024-12-10 PROCEDURE — 74178 CT ABD&PLV WO CNTR FLWD CNTR: CPT | Mod: MG

## 2024-12-10 RX ORDER — IOPAMIDOL 755 MG/ML
90 INJECTION, SOLUTION INTRAVASCULAR ONCE
Status: COMPLETED | OUTPATIENT
Start: 2024-12-10 | End: 2024-12-10

## 2024-12-10 RX ADMIN — IOPAMIDOL 90 ML: 755 INJECTION, SOLUTION INTRAVENOUS at 18:56

## 2024-12-11 ENCOUNTER — TELEPHONE (OUTPATIENT)
Dept: INTERNAL MEDICINE | Facility: CLINIC | Age: 79
End: 2024-12-11
Payer: MEDICARE

## 2024-12-11 NOTE — TELEPHONE ENCOUNTER
"[facility] calling with critical imaging result -    Renal cell carcinoma and other critical finding -  \"The provider can see them all in my report\"       Routed high priority to PCP and RN kae Michael RN     "

## 2024-12-11 NOTE — TELEPHONE ENCOUNTER
Per recent exam note a week ago, patient is asymptomatic.  Although CT scan results do show abnormalities will require specialty follow up in the near future, there is no evidence of an emergent condition. Okay to wait for PCP review.    Bertin Velez MD on 12/11/2024 at 4:51 PM

## 2024-12-12 ENCOUNTER — TELEPHONE (OUTPATIENT)
Dept: VASCULAR SURGERY | Facility: CLINIC | Age: 79
End: 2024-12-12
Payer: MEDICARE

## 2024-12-12 ENCOUNTER — HOSPITAL ENCOUNTER (OUTPATIENT)
Dept: CT IMAGING | Facility: CLINIC | Age: 79
Discharge: HOME | End: 2024-12-12
Attending: STUDENT IN AN ORGANIZED HEALTH CARE EDUCATION/TRAINING PROGRAM
Payer: MEDICARE

## 2024-12-12 ENCOUNTER — TELEPHONE (OUTPATIENT)
Dept: INTERNAL MEDICINE | Facility: CLINIC | Age: 79
End: 2024-12-12
Payer: MEDICARE

## 2024-12-12 DIAGNOSIS — I71.40 AAA (ABDOMINAL AORTIC ANEURYSM) (H): Primary | ICD-10-CM

## 2024-12-12 DIAGNOSIS — I71.40 AAA (ABDOMINAL AORTIC ANEURYSM) (H): ICD-10-CM

## 2024-12-12 DIAGNOSIS — R09.89 OTHER SPECIFIED SYMPTOMS AND SIGNS INVOLVING THE CIRCULATORY AND RESPIRATORY SYSTEMS: ICD-10-CM

## 2024-12-12 PROCEDURE — 71275 CT ANGIOGRAPHY CHEST: CPT | Mod: MG

## 2024-12-12 PROCEDURE — 74174 CTA ABD&PLVS W/CONTRAST: CPT | Mod: MG

## 2024-12-12 PROCEDURE — G1010 CDSM STANSON: HCPCS

## 2024-12-12 PROCEDURE — 250N000011 HC RX IP 250 OP 636: Performed by: STUDENT IN AN ORGANIZED HEALTH CARE EDUCATION/TRAINING PROGRAM

## 2024-12-12 RX ORDER — IOPAMIDOL 755 MG/ML
90 INJECTION, SOLUTION INTRAVASCULAR ONCE
Status: COMPLETED | OUTPATIENT
Start: 2024-12-12 | End: 2024-12-12

## 2024-12-12 RX ADMIN — IOPAMIDOL 90 ML: 755 INJECTION, SOLUTION INTRAVENOUS at 18:50

## 2024-12-12 NOTE — TELEPHONE ENCOUNTER
Patient spouse, Christen, calling with questions.    Scheduled with vascular surgery, Dr Makayla Chaudhary.     Informed Christen referral is placed as urgent and next available provider is who they should keep appointment with. Informed no provider specified on referral    Requesting Dr. Mancilla review of vascular provider and if agreeable to patient seeing this provider.

## 2024-12-12 NOTE — TELEPHONE ENCOUNTER
General Call    Contacts       Contact Date/Time Type Contact Phone/Fax    12/12/2024 10:15 AM CST Phone (Incoming) Christen Crump (Emergency Contact)           Reason for Call:  questions about surgeon     What are your questions or concerns:  questions    Date of last appointment with provider:     Could we send this information to you in [facility] or would you prefer to receive a phone call?:   Patient would prefer a phone call   Okay to leave a detailed message?: Yes at Home number on file [phone] (home)

## 2024-12-12 NOTE — TELEPHONE ENCOUNTER
Vascular Referral Intake    Appointment note (to be pasted into appt note. Also add where additional info is located ie: outside images pushed to PACS, in Epic, sent to HIM, etc):There is a large, fusiform infrarenal abdominal aortic measuring 7.3 cm in AP and transverse diameter and extending 14.5 cm in length. Mesenteric branches are all patent without significant stenosis.  3.  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 That on the right measures 2.1 cm and on the left measures 2.5 cm.    Referred by Calixto Mancilla MD  for AAA    Specialty: Vascular Surgery    Specific Provider if Necessary:  MD Nabor Yu, MD Makayla Chaudhary, or MD Krista Reyes    Visit Type: New    Time Frame: ASAP    Testing/Imaging Needed Before Consult: CTA abd pelvis, aorta US    *Schedulers: Please send welcome letter to patient after appointment(s) have been scheduled*

## 2024-12-13 ENCOUNTER — ANCILLARY PROCEDURE (OUTPATIENT)
Dept: VASCULAR ULTRASOUND | Facility: CLINIC | Age: 79
End: 2024-12-13
Attending: STUDENT IN AN ORGANIZED HEALTH CARE EDUCATION/TRAINING PROGRAM
Payer: MEDICARE

## 2024-12-13 DIAGNOSIS — I71.40 AAA (ABDOMINAL AORTIC ANEURYSM) (H): ICD-10-CM

## 2024-12-13 DIAGNOSIS — R09.89 OTHER SPECIFIED SYMPTOMS AND SIGNS INVOLVING THE CIRCULATORY AND RESPIRATORY SYSTEMS: ICD-10-CM

## 2024-12-13 PROCEDURE — 93926 LOWER EXTREMITY STUDY: CPT | Mod: 26 | Performed by: STUDENT IN AN ORGANIZED HEALTH CARE EDUCATION/TRAINING PROGRAM

## 2024-12-13 PROCEDURE — 93926 LOWER EXTREMITY STUDY: CPT

## 2024-12-17 ENCOUNTER — OFFICE VISIT (OUTPATIENT)
Dept: SURGERY | Facility: HOSPITAL | Age: 79
End: 2024-12-17
Attending: STUDENT IN AN ORGANIZED HEALTH CARE EDUCATION/TRAINING PROGRAM
Payer: MEDICARE

## 2024-12-17 VITALS
BODY MASS INDEX: 24.92 KG/M2 | SYSTOLIC BLOOD PRESSURE: 120 MMHG | HEIGHT: 74 IN | DIASTOLIC BLOOD PRESSURE: 67 MMHG | TEMPERATURE: 98.6 F | WEIGHT: 194.2 LBS | HEART RATE: 55 BPM | RESPIRATION RATE: 16 BRPM | OXYGEN SATURATION: 98 %

## 2024-12-17 DIAGNOSIS — N28.89 RIGHT RENAL MASS: ICD-10-CM

## 2024-12-17 PROCEDURE — G0463 HOSPITAL OUTPT CLINIC VISIT: HCPCS | Performed by: STUDENT IN AN ORGANIZED HEALTH CARE EDUCATION/TRAINING PROGRAM

## 2024-12-17 ASSESSMENT — PAIN SCALES - GENERAL: PAINLEVEL_OUTOF10: NO PAIN (0)

## 2024-12-17 NOTE — NURSING NOTE
"Urology Rooming Note    December 17, 2024 3:43 PM   Asael Crump is a 79 year old male who presents for:    Chief Complaint   Patient presents with    New Patient     Kidney cancer     Initial Vitals: /67 (BP Location: Right arm, Patient Position: Sitting, Cuff Size: Adult Regular)   Pulse 55   Temp 98.6  F (37  C) (Oral)   Resp 16   Ht 1.88 m (6' 2\")   Wt 88.1 kg (194 lb 3.2 oz)   SpO2 98%   BMI 24.93 kg/m   Estimated body mass index is 24.93 kg/m  as calculated from the following:    Height as of this encounter: 1.88 m (6' 2\").    Weight as of this encounter: 88.1 kg (194 lb 3.2 oz). Body surface area is 2.14 meters squared.  No Pain (0) Comment: Data Unavailable     Allergies reviewed: Yes  Medications reviewed: Yes    Medications: Medication refills not needed today.  Pharmacy name entered into [email or web address]: [id number] - [place], [place] - [address]      Darlene Croft LPN   "

## 2024-12-17 NOTE — PATIENT INSTRUCTIONS
Plan to proceed ahead with the AAA repair first  Follow-up following repairs  to discuss partial vs radical nephrectomy

## 2024-12-17 NOTE — LETTER
12/17/2024      Asael Crump  [address] [place] [address]      Dear Colleague,    Thank you for referring your patient, Asael Crump, to the [facility]. Please see a copy of my visit note below.          Chief Complaint:    Renal Mass           Consult or Referral:     Mr. Asael Crump is a 79 year old male seen at the request of Dr. Mancilla.         History of Present Illness:     Asael Crump is a 79 year old male being seen for evaluation for renal mass.  Duration of problem: Few weeks  Previous treatments: Currently considering treatment for the AAA     accompanied by his spouse and granddaughter  Reviewed previous notes from Dr. Miller    Asael was recently seen by vascular surgery and has been advised to follow-up with us for his incidentally detected renal mass  He is being planned for AAA repair  He is concerned whether the AAA takes precedence over the renal mass             Past Medical History:     Past Medical History:   Diagnosis Date     Allergic rhinitis     Created by Conversion  Replacement Utility updated for latest IMO load     Atherosclerosis of native coronary artery of native heart without angina pectoris     Created by Conversion  Replacement Utility updated for latest IMO load     BPH with urinary obstruction     Created by Conversion      Hyperlipemia, mixed     Created by Conversion      Lichen planus     Created by Conversion             Past Surgical History:     Past Surgical History:   Procedure Laterality Date     CORONARY STENT PLACEMENT  2002     VA HALLUX RIGIDUS W/CHEILECTOMY 1ST MP JT W/O IMPLT Bilateral 5/15/2015    Procedure: CHEILECTOMY BOTH FEET;  Surgeon: Ramon Corey DPM;  [facility];  Service: Podiatry            Medications     Current Outpatient Medications   Medication Sig Dispense Refill     aspirin 81 MG EC tablet [ASPIRIN 81 MG EC TABLET] Take 81 mg by mouth daily.       atenolol (TENORMIN) 25 MG  tablet TAKE 1 TABLET BY MOUTH EVERY DAY 90 tablet 1     cholecalciferol, vitamin D3, (VITAMIN D3) 2,000 unit cap [CHOLECALCIFEROL, VITAMIN D3, (VITAMIN D3) 2,000 UNIT CAP] Take by mouth daily.       cyanocobalamin (VITAMIN B-12) 1000 MCG tablet Take 1,000 mcg by mouth daily       fluticasone (FLONASE) 50 MCG/ACT nasal spray Spray 1 spray into both nostrils daily. 16 g 5     rosuvastatin (CRESTOR) 10 MG tablet TAKE 1 TABLET (10 MG) BY MOUTH DAILY. 90 tablet 1     tamsulosin (FLOMAX) 0.4 MG capsule TAKE 1 CAPSULE BY MOUTH EVERY EVENING 90 capsule 0     No current facility-administered medications for this visit.            Family History:     Family History   Problem Relation Age of Onset     Dementia Mother          97     Coronary Artery Disease Father 67     Chronic Obstructive Pulmonary Disease Father      Brain Cancer Sister      Lung Cancer Sister             Social History:     Social History     Socioeconomic History     Marital status:      Spouse name: Not on file     Number of children: Not on file     Years of education: Not on file     Highest education level: Not on file   Occupational History     Not on file   Tobacco Use     Smoking status: Former     Current packs/day: 0.00     Types: Cigarettes     Quit date: 2000     Years since quittin.0     Passive exposure: Past     Smokeless tobacco: Never   Vaping Use     Vaping status: Never Used   Substance and Sexual Activity     Alcohol use: Yes     Comment: rare     Drug use: No     Sexual activity: Not on file   Other Topics Concern     Not on file   Social History Narrative    Lives with wife, Christen.  Retired , [employer]  Dabbles in [employer].  Two step daughters (Leia and Soledad), granddaughter and great-granddaughter.     Social Drivers of Health     Financial Resource Strain: Low Risk  (2024)    Financial Resource Strain      Within the past 12 months, have you or your family members you live with been  unable to get utilities (heat, electricity) when it was really needed?: No   Food Insecurity: Low Risk  (12/2/2024)    Food Insecurity      Within the past 12 months, did you worry that your food would run out before you got money to buy more?: No      Within the past 12 months, did the food you bought just not last and you didn t have money to get more?: No   Transportation Needs: Low Risk  (12/2/2024)    Transportation Needs      Within the past 12 months, has lack of transportation kept you from medical appointments, getting your medicines, non-medical meetings or appointments, work, or from getting things that you need?: No   Physical Activity: Insufficiently Active (12/2/2024)    Exercise Vital Sign      Days of Exercise per Week: 2 days      Minutes of Exercise per Session: 30 min   Stress: No Stress Concern Present (12/2/2024)    [employer] [place] [employer] - Occupational Stress Questionnaire      Feeling of Stress : Not at all   Social Connections: Unknown (12/2/2024)    Social Connection and Isolation Panel [NHANES]      Frequency of Communication with Friends and Family: Not on file      Frequency of Social Gatherings with Friends and Family: Twice a week      Attends Anabaptist Services: Not on file      Active Member of Clubs or Organizations: Not on file      Attends Club or Organization Meetings: Not on file      Marital Status: Not on file   Interpersonal Safety: Low Risk  (12/3/2024)    Interpersonal Safety      Do you feel physically and emotionally safe where you currently live?: Yes      Within the past 12 months, have you been hit, slapped, kicked or otherwise physically hurt by someone?: No      Within the past 12 months, have you been humiliated or emotionally abused in other ways by your partner or ex-partner?: No   Housing Stability: Low Risk  (12/2/2024)    Housing Stability      Do you have housing? : Yes      Are you worried about losing your housing?: No            Allergies:  "  Other environmental allergy         Review of Systems:  From intake questionnaire     Skin: negative  Eyes: negative  Ears/Nose/Throat: negative  Respiratory: No shortness of breath, dyspnea on exertion, cough, or hemoptysis  Cardiovascular: No chest pain or palpitations  Gastrointestinal: negative; no nausea/vomiting, constipation or diarrhea  Genitourinary: as per HPI  Musculoskeletal: negative  Neurologic: negative  Psychiatric: negative  Hematologic/Lymphatic/Immunologic: negative  Endocrine: negative         Physical Exam:     Patient is a 79 year old  male   Vitals: Blood pressure 120/67, pulse 55, temperature 98.6  F (37  C), temperature source Oral, resp. rate 16, height 1.88 m (6' 2\"), weight 88.1 kg (194 lb 3.2 oz), SpO2 98%.  Constitutional: Body mass index is 24.93 kg/m .  Alert, no acute distress, oriented, conversant  Eyes: no scleral icterus; extraocular muscles intact, moist conjunctivae  Neck: trachea midline, no thyromegaly  Ears/nose/mouth: throat/mouth:normal, good dentition  Respiratory: no respiratory distress, or pursed lip breathing  Cardiovascular: pulses strong and intact; no obvious jugular venous distension present  Gastrointestinal: soft, nontender, no organomegaly or masses,   Lymphatics: No inguinal adenopathy  Musculoskeletal: extremities normal, no peripheral edema  Skin: no suspicious lesions or rashes  Neuro: Alert, oriented, speech and mentation normal  Psych: affect and mood normal, alert and oriented to person, place and time  Gait: Normal  : deferred      Labs and Pathology:    The following labs were reviewed by me and discussed with the patient:    Significant for   Lab Results   Component Value Date    CR 1.05 12/03/2024    CR 0.94 09/28/2023    CR 1.03 08/16/2022    CR 0.87 02/16/2022    CR 0.93 08/16/2021    CR 0.96 07/24/2020    CR 0.85 01/23/2020    CR 0.91 04/22/2019    CR 0.92 03/22/2018     Prostate Specific Antigen Screen   Date Value Ref Range Status "   12/03/2024 2.13 0.00 - 6.50 ng/mL Final   09/28/2023 2.17 0.00 - 6.50 ng/mL Final   08/16/2022 2.31 0.00 - 6.50 ng/mL Final   08/16/2021 2.27 0.00 - 6.50 ug/L Final   01/23/2020 1.6 0.0 - 6.5 ng/mL Final   04/22/2019 1.9 0.0 - 6.5 ng/mL Final   03/22/2018 2.0 0.0 - 6.5 ng/mL Final   03/02/2017 1.7 0.0 - 6.5 ng/mL Final   12/21/2015 1.7 0.0 - 6.5 ng/mL Final   12/19/2014 1.5 0.0 - 4.0 ng/mL Final   12/17/2013 1.76 <4.01 ng/mL Final   12/11/2012 1.24 <4.01 ng/mL Final   12/09/2011 1.50 <4.01 ng/mL Final             Imaging:    The following imaging exams were independently viewed and interpreted by me and discussed with patient:  EXAM: CT UROGRAM WO and W CONTRAST  LOCATION: [facility]  DATE: 12/10/2024     INDICATION:  Microscopic hematuria  COMPARISON: None.  TECHNIQUE: CT scan of the abdomen and pelvis using urogram technique with pre contrast, post contrast, and delayed images. Multiplanar reformats were obtained. Dose reduction techniques were used.   CONTRAST: Isovue 370 90mL     FINDINGS:   LOWER CHEST: Coronary arteries stents. Bibasilar linear fibroatelectasis. No pulmonary nodules or effusions.     HEPATOBILIARY: Normal.     PANCREAS: Mild fatty atrophy.     SPLEEN: Normal.     ADRENAL GLANDS: Normal.     RIGHT KIDNEY/URETER: There is a heterogeneously enhancing solid mass projecting inferomedially from the lower pole measuring 3.7 x 3.2 x 3.2 cm. In addition there is a incidental teardrop shaped 1.8 x 1.3 cm simple cyst at the same level projecting   posteriorly. No calculi or obstruction. Delayed images demonstrate normal right ureter.     LEFT KIDNEY/URETER: Incidental 1 cm cyst in the lower pole. No calculi or concerning lesions. Normal left ureter     BLADDER: Unremarkable.     BOWEL: Distal colonic diverticulosis. Large stool burden. No inflammatory changes. Appendix is normal.     LYMPH NODES: Normal.     VASCULATURE: There is a large, fusiform, infrarenal abdominal aortic  aneurysm extending for a length of 14.5 cm to the aortic bifurcation. This measures 7.3 in AP and transverse diameter. Small amount of mural thrombus is seen superiorly along the right   and posterior wall mainly superiorly. Celiac and SMA, single bilateral renal arteries and GALINA are patent.     There are bilateral saccular, distal common iliac artery aneurysms with thrombus within the sac. The right measures 2.1 cm in diameter and extends for a length of 2.3 cm (axial image 136). The left measures 2.5 cm in transverse diameter and extends for a   length of 2.2 cm (coronal image 63, axial image 140). No CFA aneurysms.     PELVIC ORGANS: Enlarged prostate.     MUSCULOSKELETAL: Facet arthropathy in the lower lumbar spine. No concerning bone lesions. There is a small left inguinal hernia containing only fat.                                                                      IMPRESSION:  1.  There are a few abnormalities that warrant attention.  2.  There is a large, fusiform infrarenal abdominal aortic measuring 7.3 cm in AP and transverse diameter and extending 14.5 cm in length. Mesenteric branches are all patent without significant stenosis.  3.  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 That on the right measures 2.1 cm and on the left measures 2.5 cm.  4.  Vascular surgery consultation needed.  5.  Hematuria is due to a heterogeneously enhancing 3.7 x 3.2 x 3.2 cm solid mass projecting inferomedially from the lower pole of the right kidney. Urology consultation needed for this.  6.  Incidental tiny bilateral renal cysts, one on each side.  7.  Coronary artery stents.  8.  Extensive distal colonic diverticulosis.  9.  Prostate is enlarged.  10.  Small left inguinal hernia containing only fat.  11.  Findings discussed by the undersigned with nurse Campbell in Dr. Mancilla's office at 1624. She will alert him to the report findings.                   Assessment and Plan:     Right renal mass  I discussed in detail with Asael and his family about the fact that he does have a possible renal cell carcinoma based on the evaluation on the CT.  At this time with his significantly large and concerning size of the AAA I think that takes precedence over this renal mass  He has already had a CTA including chest which is good enough for staging and does not show any metastatic disease at this time.  We also discussed that this mass would be feasible for either surveillance, partial/radical nephrectomy as well as ablation with cryotherapy  At this time my recommendations would be to proceed ahead with AAA repair and follow-up 3 months subsequently with repeat imaging  - Adult Urology [facility] Referral      Plan:  Proceed with AAA repair  Follow-up with repeat imaging    Orders  No orders of the defined types were placed in this encounter.      Morgan Joel MD  [facility]      ==========================    Additional Billing and Coding Information:  Review of external notes as documented above   Review of the result(s) of each unique test - CT urogram, creatinine                25 minutes spent by me on the date of the encounter doing chart review, review of test results, interpretation of tests, patient visit, documentation, and discussion with family     ==========================      Again, thank you for allowing me to participate in the care of your patient.        Sincerely,        Morgan Joel MD

## 2024-12-18 ENCOUNTER — TELEPHONE (OUTPATIENT)
Dept: INTERNAL MEDICINE | Facility: CLINIC | Age: 79
End: 2024-12-18

## 2024-12-18 ENCOUNTER — HOSPITAL ENCOUNTER (OUTPATIENT)
Dept: NUCLEAR MEDICINE | Facility: CLINIC | Age: 79
Discharge: HOME | End: 2024-12-18
Attending: STUDENT IN AN ORGANIZED HEALTH CARE EDUCATION/TRAINING PROGRAM
Payer: MEDICARE

## 2024-12-18 ENCOUNTER — HOSPITAL ENCOUNTER (OUTPATIENT)
Dept: CARDIOLOGY | Facility: CLINIC | Age: 79
Discharge: HOME | End: 2024-12-18
Attending: STUDENT IN AN ORGANIZED HEALTH CARE EDUCATION/TRAINING PROGRAM
Payer: MEDICARE

## 2024-12-18 ENCOUNTER — TELEPHONE (OUTPATIENT)
Dept: VASCULAR SURGERY | Facility: CLINIC | Age: 79
End: 2024-12-18

## 2024-12-18 DIAGNOSIS — I71.40 ABDOMINAL AORTIC ANEURYSM (AAA) GREATER THAN 5.5 CM IN DIAMETER IN MALE (H): ICD-10-CM

## 2024-12-18 DIAGNOSIS — Z01.810 ENCOUNTER FOR PREPROCEDURAL CARDIOVASCULAR EXAMINATION: ICD-10-CM

## 2024-12-18 LAB
CV STRESS CURRENT BP HE: NORMAL
CV STRESS CURRENT HR HE: 32
CV STRESS CURRENT HR HE: 43
CV STRESS CURRENT HR HE: 50
CV STRESS CURRENT HR HE: 50
CV STRESS CURRENT HR HE: 51
CV STRESS CURRENT HR HE: 51
CV STRESS CURRENT HR HE: 52
CV STRESS CURRENT HR HE: 52
CV STRESS CURRENT HR HE: 53
CV STRESS CURRENT HR HE: 54
CV STRESS CURRENT HR HE: 55
CV STRESS CURRENT HR HE: 56
CV STRESS CURRENT HR HE: 73
CV STRESS CURRENT HR HE: 74
CV STRESS DEVIATION TIME HE: NORMAL
CV STRESS ECHO PERCENT HR HE: NORMAL
CV STRESS EXERCISE STAGE HE: NORMAL
CV STRESS FINAL RESTING BP HE: NORMAL
CV STRESS FINAL RESTING HR HE: 56
CV STRESS MAX HR HE: 74
CV STRESS MAX TREADMILL GRADE HE: 0
CV STRESS MAX TREADMILL SPEED HE: 0
CV STRESS PEAK DIA BP HE: NORMAL
CV STRESS PEAK SYS BP HE: NORMAL
CV STRESS PHASE HE: NORMAL
CV STRESS PROTOCOL HE: NORMAL
CV STRESS RESTING PT POSITION HE: NORMAL
CV STRESS ST DEVIATION AMOUNT HE: NORMAL
CV STRESS ST DEVIATION ELEVATION HE: NORMAL
CV STRESS ST EVELATION AMOUNT HE: NORMAL
CV STRESS TEST TYPE HE: NORMAL
CV STRESS TOTAL STAGE TIME MIN 1 HE: NORMAL
NUC STRESS EJECTION FRACTION: 74 %
RATE PRESSURE PRODUCT: 4514
STRESS ECHO BASELINE DIASTOLIC HE: 67
STRESS ECHO BASELINE HR: 52
STRESS ECHO BASELINE SYSTOLIC BP: 113
STRESS ECHO CALCULATED PERCENT HR: 52 %
STRESS ECHO LAST STRESS DIASTOLIC BP: 41
STRESS ECHO LAST STRESS HR: 51
STRESS ECHO LAST STRESS SYSTOLIC BP: 61
STRESS ECHO TARGET HR: 141
STRESS/REST PERFUSION RATIO: 1.55

## 2024-12-18 PROCEDURE — G1010 CDSM STANSON: HCPCS

## 2024-12-18 PROCEDURE — 78452 HT MUSCLE IMAGE SPECT MULT: CPT | Mod: ME

## 2024-12-18 PROCEDURE — 343N000001 HC RX 343 MED OP 636: Performed by: STUDENT IN AN ORGANIZED HEALTH CARE EDUCATION/TRAINING PROGRAM

## 2024-12-18 PROCEDURE — A9500 TC99M SESTAMIBI: HCPCS | Performed by: STUDENT IN AN ORGANIZED HEALTH CARE EDUCATION/TRAINING PROGRAM

## 2024-12-18 PROCEDURE — 93017 CV STRESS TEST TRACING ONLY: CPT

## 2024-12-18 PROCEDURE — 250N000011 HC RX IP 250 OP 636: Performed by: STUDENT IN AN ORGANIZED HEALTH CARE EDUCATION/TRAINING PROGRAM

## 2024-12-18 RX ORDER — AMINOPHYLLINE 25 MG/ML
50-100 INJECTION, SOLUTION INTRAVENOUS
Status: COMPLETED | OUTPATIENT
Start: 2024-12-18 | End: 2024-12-18

## 2024-12-18 RX ORDER — REGADENOSON 0.08 MG/ML
0.4 INJECTION, SOLUTION INTRAVENOUS ONCE
Status: COMPLETED | OUTPATIENT
Start: 2024-12-18 | End: 2024-12-18

## 2024-12-18 RX ADMIN — Medication 8.4 MILLICURIE: at 08:12

## 2024-12-18 RX ADMIN — REGADENOSON 0.4 MG: 0.08 INJECTION, SOLUTION INTRAVENOUS at 08:47

## 2024-12-18 RX ADMIN — AMINOPHYLLINE 50 MG: 25 INJECTION, SOLUTION INTRAVENOUS at 08:58

## 2024-12-18 RX ADMIN — Medication 31.9 MILLICURIE: at 08:50

## 2024-12-18 NOTE — TELEPHONE ENCOUNTER
Christen contacting clinic regarding patient vascular providers.    Requesting PCP to contact her regarding this question however was able to gather more information from Christen.     Patient is scheduled to see Dm alcaraz with [facility] 12/19.     Christen reports a friend contacted her and asked why PCP did not refer patient to  Dr. Reyes. Christen states the friend informed her that is where PCP typically refers aortic patients to.    Routing for provider review.

## 2024-12-18 NOTE — TELEPHONE ENCOUNTER
Patient's spouse, Christen, calls back. Notified Christen of Dr. Mancilla's response. She verbalizes understanding.     Christen still requests to speak with Dr. Mancilla. States Dr. Chaudhary contacted them regarding the second opinion and she would like to discuss this further with Dr. Mancilla. Telephone visit schedule with Dr. Mancilla tomorrow, 12/19/24 at 7:20 am.

## 2024-12-18 NOTE — TELEPHONE ENCOUNTER
I called Asael to follow-up on recent appointment with urology and Dr. Joel as well as future planning now that stress test has been completed.  I was able to talk with his wife Christen over the phone.  She did provide insight as to her visit with Dr. Joel and he advised to proceed with aneurysm repair prior to intervention for renal cell carcinoma.  We also discussed my recent conversation during case conference with [facility] vascular surgery team (8 board certified vascular surgeons) and the agreement that we proceed with aneurysm repair after recommendations from Dr Joel as full oncological evaluation is absolutely indicated prior to our final decision on operative repair.     I did note that we would agree with plan to proceed with aneurysm repair based on nonurgent intervention on right kidney per Dr. Joel report.  Christen then informed me that she was contacted by Dr Mancilla (Primary care) on 12/13 and advised to seek a second vascular surgery opinion.  Christen stated that  they are very concerned and confused at this as they felt very informed during our visit and were happy to proceed with the plan as discussed with our team.   I discussed with her that she has every right to seek multiple opinions prior to intervention but I also informed her I was not sure why this was recommended at this time.    We also discussed our vascular surgery team approach to large complex complicated open aortic surgery and assured her that this open surgery would be performed by both Dr. Reyes and myself if it were to take place in the [facility] system.    Christen thanked me for my time and the phone call today and will reach back out to our team with their plans.    Makayla Chaudhary DO [facility]  Vascular Surgery

## 2024-12-18 NOTE — TELEPHONE ENCOUNTER
Dr. Reyes and Dr. Chaudhary are partners.  Dr. Reyes was on vacation last week and was not available.  He works closely with Dr. Chaudhary.

## 2024-12-19 ENCOUNTER — VIRTUAL VISIT (OUTPATIENT)
Dept: INTERNAL MEDICINE | Facility: CLINIC | Age: 79
End: 2024-12-19
Payer: MEDICARE

## 2024-12-19 ENCOUNTER — OFFICE VISIT (OUTPATIENT)
Dept: CARDIOLOGY | Facility: CLINIC | Age: 79
End: 2024-12-19
Attending: INTERNAL MEDICINE
Payer: MEDICARE

## 2024-12-19 VITALS
DIASTOLIC BLOOD PRESSURE: 60 MMHG | WEIGHT: 190.8 LBS | OXYGEN SATURATION: 99 % | HEIGHT: 74 IN | SYSTOLIC BLOOD PRESSURE: 108 MMHG | BODY MASS INDEX: 24.49 KG/M2 | RESPIRATION RATE: 18 BRPM | HEART RATE: 60 BPM

## 2024-12-19 DIAGNOSIS — N28.89 RIGHT RENAL MASS: ICD-10-CM

## 2024-12-19 DIAGNOSIS — E78.2 HYPERLIPEMIA, MIXED: Primary | ICD-10-CM

## 2024-12-19 DIAGNOSIS — I25.10 ATHEROSCLEROSIS OF NATIVE CORONARY ARTERY OF NATIVE HEART WITHOUT ANGINA PECTORIS: ICD-10-CM

## 2024-12-19 DIAGNOSIS — R94.39 ABNORMAL CARDIOVASCULAR STRESS TEST: ICD-10-CM

## 2024-12-19 DIAGNOSIS — I71.40 ABDOMINAL AORTIC ANEURYSM (AAA) GREATER THAN 5.5 CM IN DIAMETER IN MALE (H): Primary | ICD-10-CM

## 2024-12-19 DIAGNOSIS — I71.40 ABDOMINAL AORTIC ANEURYSM (AAA) GREATER THAN 5.5 CM IN DIAMETER IN MALE (H): ICD-10-CM

## 2024-12-19 NOTE — PROGRESS NOTES
HEART CARE ENCOUNTER CONSULTATON NOTE      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  No ischemia noted on stress test.    Plan:  No further testing required prior to abdominal aortic aneurysm repair, open procedure may be only option.  Continue Crestor  Continue atenolol  Continue aspirin      Preoperative cardiovascular risk assessment: Vascular surgery  Assessment of preoperative cardiac risk: He has no active cardiac conditions (unstable coronary syndromes, decompensated HF, significant arrhythmias, or severe valvular disease), has  known coronary artery disease, has 1 clinical risk factors (ischemic heart disease, prior heart failure, cerebrovascular disease, diabetes mellitus, and renal insufficiency), and has a functional capacity >4 than 4 METs.     Creatinine   Date Value Ref Range Status   12/03/2024 1.05 0.67 - 1.17 mg/dL Final       Surgical Recommendation(s):   1. Based on clinical risk and cardiac condition it is recommended that the patient proceeds with operation without further cardiac testing or interventions at this time.         History of Present Illness/Subjective    HPI: Asael Crump is a 79 year old male with coronary disease who presents to cardiology clinic for preoperative surgical evaluation prior to vascular surgery for abdominal aortic aneurysm.    Currently patient denies any chest pain, dyspnea exertion.  Denies any lightheadedness or syncope.  No rapid palpitations.  Recently he underwent stress testing which demonstrated a decreased area of perfusion on rest and stress in the inferior wall.   "Stress test was documented as abnormal but no ischemia.  I reviewed the images it appears that there is diaphragmatic attenuation of the inferior wall.  Wall motion is normal in the inferior wall.  He did have a history of a myocardial infarction in the inferior wall which this could also represent a nontransmural myocardial infarction.  Despite this his ejection fraction is normal.  He has no active cardiac symptoms.  Again no ischemia noted on stress testing.    Patient needs urgent repair of his abdominal aortic aneurysm given size over 7 cm.    NM STRESS:     2.Negative pharmacological regadenoson ECG for ischemia.    3.There is a medium sized area of a moderate degree of transmural infarction in the entire inferior and inferoseptal segment(s) of the left ventricle.  No ischemia seen.    4.The left ventricular ejection fraction at stress is 74%.  Inferior wall does not appear to be hypokinetic, suspects above finding could be false positive.    5.Stress to rest cavity ratio is 1.55.  This is of uncertain significance.    A prior study was conducted on 9/22/2011.  This study has no change when compared with the report no prior study, images of prior study unavailable for analysis.       Physical Examination  Review of Systems   Vitals: /60 (BP Location: Right arm, Patient Position: Sitting, Cuff Size: Adult Regular)   Pulse 60   Resp 18   Ht 1.88 m (6' 2\")   Wt 86.5 kg (190 lb 12.8 oz)   SpO2 99%   BMI 24.50 kg/m    BMI= Body mass index is 24.5 kg/m .  Wt Readings from Last 3 Encounters:   12/19/24 86.5 kg (190 lb 12.8 oz)   12/17/24 88.1 kg (194 lb 3.2 oz)   12/13/24 87.5 kg (193 lb)           ENT/Mouth: membranes moist, no oral lesions or bleeding gums.      EYES:  no scleral icterus, normal conjunctivae       Chest/Lungs:   lungs are clear to auscultation, no rales or wheezing, no sternal scar, equal chest wall expansion    Cardiovascular:   Regular. Normal first and second heart sounds with no " murmurs, rubs, or gallops; the carotid, radial and posterior tibial pulses are intact, Jugular venous pressure normal, no edema bilaterally    Abdomen:  no tenderness; bowel sounds are present   Extremities: no cyanosis or clubbing   Skin: no xanthelasma, warm.    Neurologic: normal  bilateral, no tremors     Psychiatric: alert and oriented x3, calm        Please refer above for cardiac ROS details.        Medical History  Surgical History Family History Social History   Past Medical History:   Diagnosis Date    Allergic rhinitis     Created by Conversion  Replacement Utility updated for latest IMO load    Atherosclerosis of native coronary artery of native heart without angina pectoris     Created by Conversion  Replacement Utility updated for latest IMO load    BPH with urinary obstruction     Created by Conversion     Hyperlipemia, mixed     Created by Conversion     Lichen planus     Created by Conversion      Past Surgical History:   Procedure Laterality Date    CORONARY STENT PLACEMENT      MS HALLUX RIGIDUS W/CHEILECTOMY 1ST MP JT W/O IMPLT Bilateral 5/15/2015    Procedure: CHEILECTOMY BOTH FEET;  Surgeon: Ramon Corey DPM;  Location: [place] [facility];  Service: Podiatry     Family History   Problem Relation Age of Onset    Dementia Mother          97    Coronary Artery Disease Father 67    Chronic Obstructive Pulmonary Disease Father     Brain Cancer Sister     Lung Cancer Sister         Social History     Socioeconomic History    Marital status:      Spouse name: Not on file    Number of children: Not on file    Years of education: Not on file    Highest education level: Not on file   Occupational History    Not on file   Tobacco Use    Smoking status: Former     Current packs/day: 0.00     Types: Cigarettes     Quit date: 2000     Years since quittin.0     Passive exposure: Past    Smokeless tobacco: Never   Vaping Use    Vaping status: Never Used   Substance and Sexual  Activity    Alcohol use: Yes     Comment: rare    Drug use: No    Sexual activity: Not on file   Other Topics Concern    Not on file   Social History Narrative    Lives with wife, Christen.  Retired , [employer].  Dabbles in [employer].  Two step daughters (Leia and Soledad), granddaughter and great-granddaughter.     Social Drivers of Health     Financial Resource Strain: Low Risk  (12/2/2024)    Financial Resource Strain     Within the past 12 months, have you or your family members you live with been unable to get utilities (heat, electricity) when it was really needed?: No   Food Insecurity: Low Risk  (12/2/2024)    Food Insecurity     Within the past 12 months, did you worry that your food would run out before you got money to buy more?: No     Within the past 12 months, did the food you bought just not last and you didn t have money to get more?: No   Transportation Needs: Low Risk  (12/2/2024)    Transportation Needs     Within the past 12 months, has lack of transportation kept you from medical appointments, getting your medicines, non-medical meetings or appointments, work, or from getting things that you need?: No   Physical Activity: Insufficiently Active (12/2/2024)    Exercise Vital Sign     Days of Exercise per Week: 2 days     Minutes of Exercise per Session: 30 min   Stress: No Stress Concern Present (12/2/2024)    [place] [employer] - Occupational Stress Questionnaire     Feeling of Stress : Not at all   Social Connections: Unknown (12/2/2024)    Social Connection and Isolation Panel [NHANES]     Frequency of Communication with Friends and Family: Not on file     Frequency of Social Gatherings with Friends and Family: Twice a week     Attends Advent Services: Not on file     Active Member of Clubs or Organizations: Not on file     Attends Club or Organization Meetings: Not on file     Marital Status: Not on file   Interpersonal Safety: Low Risk  (12/3/2024)     Interpersonal Safety     Do you feel physically and emotionally safe where you currently live?: Yes     Within the past 12 months, have you been hit, slapped, kicked or otherwise physically hurt by someone?: No     Within the past 12 months, have you been humiliated or emotionally abused in other ways by your partner or ex-partner?: No   Housing Stability: Low Risk  (12/2/2024)    Housing Stability     Do you have housing? : Yes     Are you worried about losing your housing?: No           Medications  Allergies   Current Outpatient Medications   Medication Sig Dispense Refill    aspirin 81 MG EC tablet [ASPIRIN 81 MG EC TABLET] Take 81 mg by mouth daily.      atenolol (TENORMIN) 25 MG tablet TAKE 1 TABLET BY MOUTH EVERY DAY 90 tablet 1    cholecalciferol, vitamin D3, (VITAMIN D3) 2,000 unit cap [CHOLECALCIFEROL, VITAMIN D3, (VITAMIN D3) 2,000 UNIT CAP] Take by mouth daily.      cyanocobalamin (VITAMIN B-12) 1000 MCG tablet Take 1,000 mcg by mouth daily      fluticasone (FLONASE) 50 MCG/ACT nasal spray Spray 1 spray into both nostrils daily. 16 g 5    rosuvastatin (CRESTOR) 10 MG tablet TAKE 1 TABLET (10 MG) BY MOUTH DAILY. 90 tablet 1    tamsulosin (FLOMAX) 0.4 MG capsule TAKE 1 CAPSULE BY MOUTH EVERY EVENING 90 capsule 0       Allergies   Allergen Reactions    Other Environmental Allergy Unknown     Tree pollen, dust, mold -- sneezing, itchy eyes          Lab Results    Chemistry/lipid CBC Cardiac Enzymes/BNP/TSH/INR   Recent Labs   Lab Test 12/03/24  0809   CHOL 115   HDL 42   LDL 56   TRIG 85     Recent Labs   Lab Test 12/03/24  0809 09/28/23  0924 08/16/22  0755   LDL 56 39 47     Recent Labs   Lab Test 12/03/24  0809      POTASSIUM 4.9   CHLORIDE 105   CO2 28   *   BUN 20.3   CR 1.05   GFRESTIMATED 72   CLARA 9.2     Recent Labs   Lab Test 12/03/24  0809 09/28/23  0924 08/16/22  0755   CR 1.05 0.94 1.03     Recent Labs   Lab Test 02/16/22  0748   A1C 5.2          Recent Labs   Lab Test 12/03/24  0809  "  WBC 7.4   HGB 14.4   HCT 43.3   MCV 88        Recent Labs   Lab Test 12/03/24  0809 09/28/23  0924 08/16/22  0755   HGB 14.4 14.2 14.7    No results for input(s): \"TROPONINI\" in the last 92645 hours.  No results for input(s): \"BNP\", \"NTBNPI\", \"NTBNP\" in the last 33647 hours.  Recent Labs   Lab Test 02/16/22  0747   TSH 1.79     No results for input(s): \"INR\" in the last 21763 hours.     Jevon Wright,   Cardiologist     46 minutes spent by me on the date of the encounter doing chart review, history and exam, documentation and further activities per the note                            "

## 2024-12-19 NOTE — PROGRESS NOTES
Asael is a 79 year old who is being evaluated via a billable telephone visit.    What phone number would you like to be contacted at? [phone]  How would you like to obtain your AVS? MyChart  Originating Location (pt. Location): Home    Distant Location (provider location):  On-site    1. Abdominal aortic aneurysm (AAA) greater than 5.5 cm in diameter in male (H) (Primary)  With a large abdominal aneurysm, met with vascular surgery, Thierry/Supriya and seeking a second opinion with Dr. Dm Watters.  It sounds like this would need to be an open repair.  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 Joel who would recommended addressing the nephroma after AAA repair.  Stress test obtained shows no inducible ischemia but area of prior infarction.  He did have an infarction in 2001 when his RCA stent was placed and will make sure he is able to meet with cardiology prior to any procedure and referral is placed.  - [facility]  Referral; Future    2. Abnormal cardiovascular stress test  - [facility]  Referral; Future    3. Atherosclerosis of native coronary artery of native heart without angina pectoris  - [facility]  Referral; Future    4. Right renal mass  As above    Subjective   Asael is a 79 year old, presenting for the following health issues:  History of Present Illness (Discuss vascular concerns)      12/19/2024     7:02 AM   Additional Questions   Roomed by saba bautista     History of Present Illness       Reason for visit:  Second opinion   He is taking medications regularly.         Objective    Vitals - Patient Reported  Pain Score: No Pain (0)    Physical Exam   General: Alert and no distress //Respiratory: No audible wheeze, cough, or shortness of breath // Psychiatric:  Appropriate affect, tone, and pace of words        Phone call duration: 31 minutes  Signed Electronically by: Calixto Mancilla MD

## 2024-12-19 NOTE — LETTER
12/19/2024    Calixto Mancilla MD  [address] [facility] [address]    RE: Asael Crump       Dear Colleague,     I had the pleasure of seeing Asael Crump in the [facility].    HEART CARE ENCOUNTER CONSULTATON NOTE      [facility]  [phone]      Assessment/Recommendations   Assessment:   Coronary Artery Disease status post right coronary artery stent  Hyperlipidemia  Juxtarenal abdominal aortic aneurysm measuring 7.4 x 7.2 cm.,  Require surgical intervention  Decreased perfusion in the inferior wall with normal wall motion likely represents prior nontransmural myocardial infarction or diaphragmatic attenuation artifact.  No ischemia noted on stress test.    Plan:  No further testing required prior to abdominal aortic aneurysm repair, open procedure may be only option.  Continue Crestor  Continue atenolol  Continue aspirin      Preoperative cardiovascular risk assessment: Vascular surgery  Assessment of preoperative cardiac risk: He has no active cardiac conditions (unstable coronary syndromes, decompensated HF, significant arrhythmias, or severe valvular disease), has  known coronary artery disease, has 1 clinical risk factors (ischemic heart disease, prior heart failure, cerebrovascular disease, diabetes mellitus, and renal insufficiency), and has a functional capacity >4 than 4 METs.     Creatinine   Date Value Ref Range Status   12/03/2024 1.05 0.67 - 1.17 mg/dL Final       Surgical Recommendation(s):   1. Based on clinical risk and cardiac condition it is recommended that the patient proceeds with operation without further cardiac testing or interventions at this time.         History of Present Illness/Subjective    HPI: Asael Crump is a 79 year old male with coronary disease who presents to cardiology clinic for preoperative surgical evaluation prior to vascular surgery for abdominal aortic aneurysm.    Currently patient denies any chest pain, dyspnea  "exertion.  Denies any lightheadedness or syncope.  No rapid palpitations.  Recently he underwent stress testing which demonstrated a decreased area of perfusion on rest and stress in the inferior wall.  Stress test was documented as abnormal but no ischemia.  I reviewed the images it appears that there is diaphragmatic attenuation of the inferior wall.  Wall motion is normal in the inferior wall.  He did have a history of a myocardial infarction in the inferior wall which this could also represent a nontransmural myocardial infarction.  Despite this his ejection fraction is normal.  He has no active cardiac symptoms.  Again no ischemia noted on stress testing.    Patient needs urgent repair of his abdominal aortic aneurysm given size over 7 cm.    NM STRESS:      2.Negative pharmacological regadenoson ECG for ischemia.     3.There is a medium sized area of a moderate degree of transmural infarction in the entire inferior and inferoseptal segment(s) of the left ventricle.  No ischemia seen.     4.The left ventricular ejection fraction at stress is 74%.  Inferior wall does not appear to be hypokinetic, suspects above finding could be false positive.     5.Stress to rest cavity ratio is 1.55.  This is of uncertain significance.     A prior study was conducted on 9/22/2011.  This study has no change when compared with the report no prior study, images of prior study unavailable for analysis.       Physical Examination  Review of Systems   Vitals: /60 (BP Location: Right arm, Patient Position: Sitting, Cuff Size: Adult Regular)   Pulse 60   Resp 18   Ht 1.88 m (6' 2\")   Wt 86.5 kg (190 lb 12.8 oz)   SpO2 99%   BMI 24.50 kg/m    BMI= Body mass index is 24.5 kg/m .  Wt Readings from Last 3 Encounters:   12/19/24 86.5 kg (190 lb 12.8 oz)   12/17/24 88.1 kg (194 lb 3.2 oz)   12/13/24 87.5 kg (193 lb)           ENT/Mouth: membranes moist, no oral lesions or bleeding gums.      EYES:  no scleral icterus, normal " conjunctivae       Chest/Lungs:   lungs are clear to auscultation, no rales or wheezing, no sternal scar, equal chest wall expansion    Cardiovascular:   Regular. Normal first and second heart sounds with no murmurs, rubs, or gallops; the carotid, radial and posterior tibial pulses are intact, Jugular venous pressure normal, no edema bilaterally    Abdomen:  no tenderness; bowel sounds are present   Extremities: no cyanosis or clubbing   Skin: no xanthelasma, warm.    Neurologic: normal  bilateral, no tremors     Psychiatric: alert and oriented x3, calm        Please refer above for cardiac ROS details.        Medical History  Surgical History Family History Social History   Past Medical History:   Diagnosis Date     Allergic rhinitis     Created by Conversion  Replacement Utility updated for latest IMO load     Atherosclerosis of native coronary artery of native heart without angina pectoris     Created by Conversion  Replacement Utility updated for latest IMO load     BPH with urinary obstruction     Created by Conversion      Hyperlipemia, mixed     Created by Conversion      Lichen planus     Created by Conversion      Past Surgical History:   Procedure Laterality Date     CORONARY STENT PLACEMENT       OR HALLUX RIGIDUS W/CHEILECTOMY 1ST MP JT W/O IMPLT Bilateral 5/15/2015    Procedure: CHEILECTOMY BOTH FEET;  Surgeon: Ramon Corey DPM;  Location: [place] [facility];  Service: Podiatry     Family History   Problem Relation Age of Onset     Dementia Mother          97     Coronary Artery Disease Father 67     Chronic Obstructive Pulmonary Disease Father      Brain Cancer Sister      Lung Cancer Sister         Social History     Socioeconomic History     Marital status:      Spouse name: Not on file     Number of children: Not on file     Years of education: Not on file     Highest education level: Not on file   Occupational History     Not on file   Tobacco Use     Smoking status: Former      Current packs/day: 0.00     Types: Cigarettes     Quit date: 2000     Years since quittin.0     Passive exposure: Past     Smokeless tobacco: Never   Vaping Use     Vaping status: Never Used   Substance and Sexual Activity     Alcohol use: Yes     Comment: rare     Drug use: No     Sexual activity: Not on file   Other Topics Concern     Not on file   Social History Narrative    Lives with wife, Christen.  Retired , [employer].  Dabbles in [employer].  Two step daughters (Leia and Soledad), granddaughter and great-granddaughter.     Social Drivers of Health     Financial Resource Strain: Low Risk  (2024)    Financial Resource Strain      Within the past 12 months, have you or your family members you live with been unable to get utilities (heat, electricity) when it was really needed?: No   Food Insecurity: Low Risk  (2024)    Food Insecurity      Within the past 12 months, did you worry that your food would run out before you got money to buy more?: No      Within the past 12 months, did the food you bought just not last and you didn t have money to get more?: No   Transportation Needs: Low Risk  (2024)    Transportation Needs      Within the past 12 months, has lack of transportation kept you from medical appointments, getting your medicines, non-medical meetings or appointments, work, or from getting things that you need?: No   Physical Activity: Insufficiently Active (2024)    Exercise Vital Sign      Days of Exercise per Week: 2 days      Minutes of Exercise per Session: 30 min   Stress: No Stress Concern Present (2024)    [place] [employer] - Occupational Stress Questionnaire      Feeling of Stress : Not at all   Social Connections: Unknown (2024)    Social Connection and Isolation Panel [NHANES]      Frequency of Communication with Friends and Family: Not on file      Frequency of Social Gatherings with Friends and Family: Twice a week       Attends Latter day Services: Not on file      Active Member of Clubs or Organizations: Not on file      Attends Club or Organization Meetings: Not on file      Marital Status: Not on file   Interpersonal Safety: Low Risk  (12/3/2024)    Interpersonal Safety      Do you feel physically and emotionally safe where you currently live?: Yes      Within the past 12 months, have you been hit, slapped, kicked or otherwise physically hurt by someone?: No      Within the past 12 months, have you been humiliated or emotionally abused in other ways by your partner or ex-partner?: No   Housing Stability: Low Risk  (12/2/2024)    Housing Stability      Do you have housing? : Yes      Are you worried about losing your housing?: No           Medications  Allergies   Current Outpatient Medications   Medication Sig Dispense Refill     aspirin 81 MG EC tablet [ASPIRIN 81 MG EC TABLET] Take 81 mg by mouth daily.       atenolol (TENORMIN) 25 MG tablet TAKE 1 TABLET BY MOUTH EVERY DAY 90 tablet 1     cholecalciferol, vitamin D3, (VITAMIN D3) 2,000 unit cap [CHOLECALCIFEROL, VITAMIN D3, (VITAMIN D3) 2,000 UNIT CAP] Take by mouth daily.       cyanocobalamin (VITAMIN B-12) 1000 MCG tablet Take 1,000 mcg by mouth daily       fluticasone (FLONASE) 50 MCG/ACT nasal spray Spray 1 spray into both nostrils daily. 16 g 5     rosuvastatin (CRESTOR) 10 MG tablet TAKE 1 TABLET (10 MG) BY MOUTH DAILY. 90 tablet 1     tamsulosin (FLOMAX) 0.4 MG capsule TAKE 1 CAPSULE BY MOUTH EVERY EVENING 90 capsule 0       Allergies   Allergen Reactions     Other Environmental Allergy Unknown     Tree pollen, dust, mold -- sneezing, itchy eyes          Lab Results    Chemistry/lipid CBC Cardiac Enzymes/BNP/TSH/INR   Recent Labs   Lab Test 12/03/24  0809   CHOL 115   HDL 42   LDL 56   TRIG 85     Recent Labs   Lab Test 12/03/24  0809 09/28/23  0924 08/16/22  0755   LDL 56 39 47     Recent Labs   Lab Test 12/03/24  0809      POTASSIUM 4.9   CHLORIDE 105   CO2  "28   *   BUN 20.3   CR 1.05   GFRESTIMATED 72   CLARA 9.2     Recent Labs   Lab Test 12/03/24  0809 09/28/23  0924 08/16/22  0755   CR 1.05 0.94 1.03     Recent Labs   Lab Test 02/16/22  0748   A1C 5.2          Recent Labs   Lab Test 12/03/24  0809   WBC 7.4   HGB 14.4   HCT 43.3   MCV 88        Recent Labs   Lab Test 12/03/24  0809 09/28/23  0924 08/16/22  0755   HGB 14.4 14.2 14.7    No results for input(s): \"TROPONINI\" in the last 15880 hours.  No results for input(s): \"BNP\", \"NTBNPI\", \"NTBNP\" in the last 91080 hours.  Recent Labs   Lab Test 02/16/22  0747   TSH 1.79     No results for input(s): \"INR\" in the last 22798 hours.     Jevon Wright DO  Cardiologist     46 minutes spent by me on the date of the encounter doing chart review, history and exam, documentation and further activities per the note                              Thank you for allowing me to participate in the care of your patient.      Sincerely,     Jevon Wright DO     [facility]  cc:   Calixto Mancilla MD  [address] [place] [id number]      "

## 2024-12-20 ENCOUNTER — TELEPHONE (OUTPATIENT)
Dept: INTERNAL MEDICINE | Facility: CLINIC | Age: 79
End: 2024-12-20
Payer: MEDICARE

## 2024-12-20 NOTE — TELEPHONE ENCOUNTER
General Call    Contacts       Contact Date/Time Type Contact Phone/Fax    12/20/2024 10:40 AM CST Phone (Incoming) Christen Crump (Emergency Contact) [phone] (M)          Reason for Call:  Patient's wife wants to talk to Dr Mancilla    What are your questions or concerns:  States Dr Mancilla told her to call him to tell him which Surgeon they decided to go with. Pt would not disclose the Surgeon they have chosen as she wants to speak with Dr Mancilla only.     Could we send this information to you in Brndstr or would you prefer to receive a phone call?:   Patient would prefer a phone call     Okay to leave a detailed message?: Yes   [phone] before 2 pm or [phone]  After 2 pm

## 2024-12-21 NOTE — PROGRESS NOTES
Chief Complaint:    Renal Mass           Consult or Referral:     Mr. Asael Crump is a 79 year old male seen at the request of Dr. Mancilla.         History of Present Illness:     Asael Crump is a 79 year old male being seen for evaluation for renal mass.  Duration of problem: Few weeks  Previous treatments: Currently considering treatment for the AAA     accompanied by his spouse and granddaughter  Reviewed previous notes from Dr. Miller    Asael was recently seen by vascular surgery and has been advised to follow-up with us for his incidentally detected renal mass  He is being planned for AAA repair  He is concerned whether the AAA takes precedence over the renal mass             Past Medical History:     Past Medical History:   Diagnosis Date    Allergic rhinitis     Created by Conversion  Replacement Utility updated for latest IMO load    Atherosclerosis of native coronary artery of native heart without angina pectoris     Created by Conversion  Replacement Utility updated for latest IMO load    BPH with urinary obstruction     Created by Conversion     Hyperlipemia, mixed     Created by Conversion     Lichen planus     Created by Conversion             Past Surgical History:     Past Surgical History:   Procedure Laterality Date    CORONARY STENT PLACEMENT  2002    DE HALLUX RIGIDUS W/CHEILECTOMY 1ST MP JT W/O IMPLT Bilateral 5/15/2015    Procedure: CHEILECTOMY BOTH FEET;  Surgeon: Ramon Corey DPM;  Location: [facility];  Service: Podiatry            Medications     Current Outpatient Medications   Medication Sig Dispense Refill    aspirin 81 MG EC tablet [ASPIRIN 81 MG EC TABLET] Take 81 mg by mouth daily.      atenolol (TENORMIN) 25 MG tablet TAKE 1 TABLET BY MOUTH EVERY DAY 90 tablet 1    cholecalciferol, vitamin D3, (VITAMIN D3) 2,000 unit cap [CHOLECALCIFEROL, VITAMIN D3, (VITAMIN D3) 2,000 UNIT CAP] Take by mouth daily.      cyanocobalamin (VITAMIN B-12) 1000 MCG tablet Take  1,000 mcg by mouth daily      fluticasone (FLONASE) 50 MCG/ACT nasal spray Spray 1 spray into both nostrils daily. 16 g 5    rosuvastatin (CRESTOR) 10 MG tablet TAKE 1 TABLET (10 MG) BY MOUTH DAILY. 90 tablet 1    tamsulosin (FLOMAX) 0.4 MG capsule TAKE 1 CAPSULE BY MOUTH EVERY EVENING 90 capsule 0     No current facility-administered medications for this visit.            Family History:     Family History   Problem Relation Age of Onset    Dementia Mother          97    Coronary Artery Disease Father 67    Chronic Obstructive Pulmonary Disease Father     Brain Cancer Sister     Lung Cancer Sister             Social History:     Social History     Socioeconomic History    Marital status:      Spouse name: Not on file    Number of children: Not on file    Years of education: Not on file    Highest education level: Not on file   Occupational History    Not on file   Tobacco Use    Smoking status: Former     Current packs/day: 0.00     Types: Cigarettes     Quit date: 2000     Years since quittin.0     Passive exposure: Past    Smokeless tobacco: Never   Vaping Use    Vaping status: Never Used   Substance and Sexual Activity    Alcohol use: Yes     Comment: rare    Drug use: No    Sexual activity: Not on file   Other Topics Concern    Not on file   Social History Narrative    Lives with wife, Christen.  Retired , [employer].  Dabbles in [employer].  Two step daughters (Leia and Soledad), granddaughter and great-granddaughter.     Social Drivers of Health     Financial Resource Strain: Low Risk  (2024)    Financial Resource Strain     Within the past 12 months, have you or your family members you live with been unable to get utilities (heat, electricity) when it was really needed?: No   Food Insecurity: Low Risk  (2024)    Food Insecurity     Within the past 12 months, did you worry that your food would run out before you got money to buy more?: No     Within the past 12  months, did the food you bought just not last and you didn t have money to get more?: No   Transportation Needs: Low Risk  (12/2/2024)    Transportation Needs     Within the past 12 months, has lack of transportation kept you from medical appointments, getting your medicines, non-medical meetings or appointments, work, or from getting things that you need?: No   Physical Activity: Insufficiently Active (12/2/2024)    Exercise Vital Sign     Days of Exercise per Week: 2 days     Minutes of Exercise per Session: 30 min   Stress: No Stress Concern Present (12/2/2024)    [employer] - Occupational Stress Questionnaire     Feeling of Stress : Not at all   Social Connections: Unknown (12/2/2024)    Social Connection and Isolation Panel [NHANES]     Frequency of Communication with Friends and Family: Not on file     Frequency of Social Gatherings with Friends and Family: Twice a week     Attends Mu-ism Services: Not on file     Active Member of Clubs or Organizations: Not on file     Attends Club or Organization Meetings: Not on file     Marital Status: Not on file   Interpersonal Safety: Low Risk  (12/3/2024)    Interpersonal Safety     Do you feel physically and emotionally safe where you currently live?: Yes     Within the past 12 months, have you been hit, slapped, kicked or otherwise physically hurt by someone?: No     Within the past 12 months, have you been humiliated or emotionally abused in other ways by your partner or ex-partner?: No   Housing Stability: Low Risk  (12/2/2024)    Housing Stability     Do you have housing? : Yes     Are you worried about losing your housing?: No            Allergies:   Other environmental allergy         Review of Systems:  From intake questionnaire     Skin: negative  Eyes: negative  Ears/Nose/Throat: negative  Respiratory: No shortness of breath, dyspnea on exertion, cough, or hemoptysis  Cardiovascular: No chest pain or  "palpitations  Gastrointestinal: negative; no nausea/vomiting, constipation or diarrhea  Genitourinary: as per HPI  Musculoskeletal: negative  Neurologic: negative  Psychiatric: negative  Hematologic/Lymphatic/Immunologic: negative  Endocrine: negative         Physical Exam:     Patient is a 79 year old  male   Vitals: Blood pressure 120/67, pulse 55, temperature 98.6  F (37  C), temperature source Oral, resp. rate 16, height 1.88 m (6' 2\"), weight 88.1 kg (194 lb 3.2 oz), SpO2 98%.  Constitutional: Body mass index is 24.93 kg/m .  Alert, no acute distress, oriented, conversant  Eyes: no scleral icterus; extraocular muscles intact, moist conjunctivae  Neck: trachea midline, no thyromegaly  Ears/nose/mouth: throat/mouth:normal, good dentition  Respiratory: no respiratory distress, or pursed lip breathing  Cardiovascular: pulses strong and intact; no obvious jugular venous distension present  Gastrointestinal: soft, nontender, no organomegaly or masses,   Lymphatics: No inguinal adenopathy  Musculoskeletal: extremities normal, no peripheral edema  Skin: no suspicious lesions or rashes  Neuro: Alert, oriented, speech and mentation normal  Psych: affect and mood normal, alert and oriented to person, place and time  Gait: Normal  : deferred      Labs and Pathology:    The following labs were reviewed by me and discussed with the patient:    Significant for   Lab Results   Component Value Date    CR 1.05 12/03/2024    CR 0.94 09/28/2023    CR 1.03 08/16/2022    CR 0.87 02/16/2022    CR 0.93 08/16/2021    CR 0.96 07/24/2020    CR 0.85 01/23/2020    CR 0.91 04/22/2019    CR 0.92 03/22/2018     Prostate Specific Antigen Screen   Date Value Ref Range Status   12/03/2024 2.13 0.00 - 6.50 ng/mL Final   09/28/2023 2.17 0.00 - 6.50 ng/mL Final   08/16/2022 2.31 0.00 - 6.50 ng/mL Final   08/16/2021 2.27 0.00 - 6.50 ug/L Final   01/23/2020 1.6 0.0 - 6.5 ng/mL Final   04/22/2019 1.9 0.0 - 6.5 ng/mL Final   03/22/2018 2.0 0.0 - 6.5 " ng/mL Final   03/02/2017 1.7 0.0 - 6.5 ng/mL Final   12/21/2015 1.7 0.0 - 6.5 ng/mL Final   12/19/2014 1.5 0.0 - 4.0 ng/mL Final   12/17/2013 1.76 <4.01 ng/mL Final   12/11/2012 1.24 <4.01 ng/mL Final   12/09/2011 1.50 <4.01 ng/mL Final             Imaging:    The following imaging exams were independently viewed and interpreted by me and discussed with patient:  EXAM: CT UROGRAM WO and W CONTRAST  LOCATION: [facility]  DATE: 12/10/2024     INDICATION:  Microscopic hematuria  COMPARISON: None.  TECHNIQUE: CT scan of the abdomen and pelvis using urogram technique with pre contrast, post contrast, and delayed images. Multiplanar reformats were obtained. Dose reduction techniques were used.   CONTRAST: Isovue 370 90mL     FINDINGS:   LOWER CHEST: Coronary arteries stents. Bibasilar linear fibroatelectasis. No pulmonary nodules or effusions.     HEPATOBILIARY: Normal.     PANCREAS: Mild fatty atrophy.     SPLEEN: Normal.     ADRENAL GLANDS: Normal.     RIGHT KIDNEY/URETER: There is a heterogeneously enhancing solid mass projecting inferomedially from the lower pole measuring 3.7 x 3.2 x 3.2 cm. In addition there is a incidental teardrop shaped 1.8 x 1.3 cm simple cyst at the same level projecting   posteriorly. No calculi or obstruction. Delayed images demonstrate normal right ureter.     LEFT KIDNEY/URETER: Incidental 1 cm cyst in the lower pole. No calculi or concerning lesions. Normal left ureter     BLADDER: Unremarkable.     BOWEL: Distal colonic diverticulosis. Large stool burden. No inflammatory changes. Appendix is normal.     LYMPH NODES: Normal.     VASCULATURE: There is a large, fusiform, infrarenal abdominal aortic aneurysm extending for a length of 14.5 cm to the aortic bifurcation. This measures 7.3 in AP and transverse diameter. Small amount of mural thrombus is seen superiorly along the right   and posterior wall mainly superiorly. Celiac and SMA, single bilateral renal arteries  and GALINA are patent.     There are bilateral saccular, distal common iliac artery aneurysms with thrombus within the sac. The right measures 2.1 cm in diameter and extends for a length of 2.3 cm (axial image 136). The left measures 2.5 cm in transverse diameter and extends for a   length of 2.2 cm (coronal image 63, axial image 140). No CFA aneurysms.     PELVIC ORGANS: Enlarged prostate.     MUSCULOSKELETAL: Facet arthropathy in the lower lumbar spine. No concerning bone lesions. There is a small left inguinal hernia containing only fat.                                                                      IMPRESSION:  1.  There are a few abnormalities that warrant attention.  2.  There is a large, fusiform infrarenal abdominal aortic measuring 7.3 cm in AP and transverse diameter and extending 14.5 cm in length. Mesenteric branches are all patent without significant stenosis.  3.  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 That on the right measures 2.1 cm and on the left measures 2.5 cm.  4.  Vascular surgery consultation needed.  5.  Hematuria is due to a heterogeneously enhancing 3.7 x 3.2 x 3.2 cm solid mass projecting inferomedially from the lower pole of the right kidney. Urology consultation needed for this.  6.  Incidental tiny bilateral renal cysts, one on each side.  7.  Coronary artery stents.  8.  Extensive distal colonic diverticulosis.  9.  Prostate is enlarged.  10.  Small left inguinal hernia containing only fat.  11.  Findings discussed by the undersigned with nurse Campbell in Dr. Mancilla's office at 1628. She will alert him to the report findings.                  Assessment and Plan:     Right renal mass  I discussed in detail with Asael and his family about the fact that he does have a possible renal cell carcinoma based on the evaluation on the CT.  At this time with his significantly large and concerning size of the AAA I  think that takes precedence over this renal mass  He has already had a CTA including chest which is good enough for staging and does not show any metastatic disease at this time.  We also discussed that this mass would be feasible for either surveillance, partial/radical nephrectomy as well as ablation with cryotherapy  At this time my recommendations would be to proceed ahead with AAA repair and follow-up 3 months subsequently with repeat imaging  - Adult Urology [facility] Referral      Plan:  Proceed with AAA repair  Follow-up with repeat imaging    Orders  No orders of the defined types were placed in this encounter.      Morgan Joel MD  [facility]      ==========================    Additional Billing and Coding Information:  Review of external notes as documented above   Review of the result(s) of each unique test - CT urogram, creatinine                25 minutes spent by me on the date of the encounter doing chart review, review of test results, interpretation of tests, patient visit, documentation, and discussion with family     ==========================

## 2024-12-30 ENCOUNTER — TELEPHONE (OUTPATIENT)
Dept: INTERNAL MEDICINE | Facility: CLINIC | Age: 79
End: 2024-12-30
Payer: MEDICARE

## 2024-12-30 NOTE — TELEPHONE ENCOUNTER
Reason for Call:  Appointment Request    Patient requesting this type of appt:  Hospital/ED Follow-Up     Requested provider: Calixto Mancilla    Reason patient unable to be scheduled: Not with their preferred provider    When does patient want to be seen/preferred time: 3-7 days    Comments: post hospital stay needs to be seen within 5 days    Could we send this information to you in [facility] or would you prefer to receive a phone call?:   Patient would prefer a phone call   Okay to leave a detailed message?: Yes at Cell number on file:    Telephone Information:   Mobile [phone]       Call taken on 12/30/2024 at 11:01 AM by Aylin Johnson

## 2025-01-12 DIAGNOSIS — I25.10 ATHEROSCLEROSIS OF NATIVE CORONARY ARTERY OF NATIVE HEART WITHOUT ANGINA PECTORIS: ICD-10-CM

## 2025-01-13 RX ORDER — ATENOLOL 25 MG/1
TABLET ORAL
Qty: 90 TABLET | Refills: 2 | Status: SHIPPED | OUTPATIENT
Start: 2025-01-13

## 2025-01-15 ENCOUNTER — OFFICE VISIT (OUTPATIENT)
Dept: INTERNAL MEDICINE | Facility: CLINIC | Age: 80
End: 2025-01-15
Payer: MEDICARE

## 2025-01-15 ENCOUNTER — ANCILLARY PROCEDURE (OUTPATIENT)
Dept: GENERAL RADIOLOGY | Facility: CLINIC | Age: 80
End: 2025-01-15
Attending: INTERNAL MEDICINE
Payer: MEDICARE

## 2025-01-15 VITALS
SYSTOLIC BLOOD PRESSURE: 114 MMHG | RESPIRATION RATE: 15 BRPM | OXYGEN SATURATION: 100 % | BODY MASS INDEX: 22.2 KG/M2 | DIASTOLIC BLOOD PRESSURE: 74 MMHG | HEIGHT: 74 IN | HEART RATE: 87 BPM | WEIGHT: 173 LBS | TEMPERATURE: 97.5 F

## 2025-01-15 DIAGNOSIS — K59.00 CONSTIPATION, UNSPECIFIED CONSTIPATION TYPE: ICD-10-CM

## 2025-01-15 DIAGNOSIS — R10.84 ABDOMINAL PAIN, GENERALIZED: ICD-10-CM

## 2025-01-15 DIAGNOSIS — N28.89 RENAL MASS: Primary | ICD-10-CM

## 2025-01-15 DIAGNOSIS — E78.2 HYPERLIPEMIA, MIXED: ICD-10-CM

## 2025-01-15 DIAGNOSIS — R42 LIGHTHEADEDNESS: ICD-10-CM

## 2025-01-15 DIAGNOSIS — I71.40 ABDOMINAL AORTIC ANEURYSM (AAA) GREATER THAN 5.5 CM IN DIAMETER IN MALE: Primary | ICD-10-CM

## 2025-01-15 DIAGNOSIS — I25.10 ATHEROSCLEROSIS OF NATIVE CORONARY ARTERY OF NATIVE HEART WITHOUT ANGINA PECTORIS: ICD-10-CM

## 2025-01-15 LAB
ALBUMIN UR-MCNC: NEGATIVE MG/DL
APPEARANCE UR: CLEAR
ATRIAL RATE - MUSE: 103 BPM
BACTERIA #/AREA URNS HPF: ABNORMAL /HPF
BILIRUB UR QL STRIP: NEGATIVE
COLOR UR AUTO: YELLOW
DIASTOLIC BLOOD PRESSURE - MUSE: NORMAL MMHG
ERYTHROCYTE [DISTWIDTH] IN BLOOD BY AUTOMATED COUNT: 13 % (ref 10–15)
GLUCOSE UR STRIP-MCNC: NEGATIVE MG/DL
HCT VFR BLD AUTO: 44.7 % (ref 40–53)
HGB BLD-MCNC: 14.6 G/DL (ref 13.3–17.7)
HGB UR QL STRIP: ABNORMAL
HYALINE CASTS #/AREA URNS LPF: ABNORMAL /LPF
INTERPRETATION ECG - MUSE: NORMAL
KETONES UR STRIP-MCNC: NEGATIVE MG/DL
LEUKOCYTE ESTERASE UR QL STRIP: NEGATIVE
MCH RBC QN AUTO: 28.1 PG (ref 26.5–33)
MCHC RBC AUTO-ENTMCNC: 32.7 G/DL (ref 31.5–36.5)
MCV RBC AUTO: 86 FL (ref 78–100)
MUCOUS THREADS #/AREA URNS LPF: PRESENT /LPF
NITRATE UR QL: NEGATIVE
P AXIS - MUSE: 40 DEGREES
PH UR STRIP: 5.5 [PH] (ref 5–8)
PLATELET # BLD AUTO: 360 10E3/UL (ref 150–450)
PR INTERVAL - MUSE: 152 MS
QRS DURATION - MUSE: 96 MS
QT - MUSE: 348 MS
QTC - MUSE: 455 MS
R AXIS - MUSE: -46 DEGREES
RBC # BLD AUTO: 5.2 10E6/UL (ref 4.4–5.9)
RBC #/AREA URNS AUTO: ABNORMAL /HPF
SP GR UR STRIP: 1.01 (ref 1–1.03)
SQUAMOUS #/AREA URNS AUTO: ABNORMAL /LPF
SYSTOLIC BLOOD PRESSURE - MUSE: NORMAL MMHG
T AXIS - MUSE: 68 DEGREES
UROBILINOGEN UR STRIP-ACNC: 1 E.U./DL
VENTRICULAR RATE- MUSE: 103 BPM
WBC # BLD AUTO: 9.7 10E3/UL (ref 4–11)
WBC #/AREA URNS AUTO: ABNORMAL /HPF

## 2025-01-15 PROCEDURE — 87086 URINE CULTURE/COLONY COUNT: CPT | Performed by: INTERNAL MEDICINE

## 2025-01-15 PROCEDURE — 93005 ELECTROCARDIOGRAM TRACING: CPT | Performed by: INTERNAL MEDICINE

## 2025-01-15 PROCEDURE — 85027 COMPLETE CBC AUTOMATED: CPT | Performed by: INTERNAL MEDICINE

## 2025-01-15 PROCEDURE — 93010 ELECTROCARDIOGRAM REPORT: CPT | Mod: OFF | Performed by: STUDENT IN AN ORGANIZED HEALTH CARE EDUCATION/TRAINING PROGRAM

## 2025-01-15 PROCEDURE — 81001 URINALYSIS AUTO W/SCOPE: CPT | Performed by: INTERNAL MEDICINE

## 2025-01-15 PROCEDURE — G2211 COMPLEX E/M VISIT ADD ON: HCPCS | Performed by: INTERNAL MEDICINE

## 2025-01-15 PROCEDURE — 99215 OFFICE O/P EST HI 40 MIN: CPT | Performed by: INTERNAL MEDICINE

## 2025-01-15 PROCEDURE — 71046 X-RAY EXAM CHEST 2 VIEWS: CPT | Mod: TC | Performed by: RADIOLOGY

## 2025-01-15 PROCEDURE — 36415 COLL VENOUS BLD VENIPUNCTURE: CPT | Performed by: INTERNAL MEDICINE

## 2025-01-15 PROCEDURE — 74018 RADEX ABDOMEN 1 VIEW: CPT | Mod: TC | Performed by: RADIOLOGY

## 2025-01-15 ASSESSMENT — PAIN SCALES - GENERAL: PAINLEVEL_OUTOF10: EXTREME PAIN (8)

## 2025-01-15 NOTE — PROGRESS NOTES
1. Abdominal aortic aneurysm (AAA) greater than 5.5 cm in diameter in male (Primary)  Doing remarkably well status post open repair of AAA on 12/24/2024.  His hemoglobin has been stable and kidney function are normal.  It sounds like he had a little bit of delirium post discharge at home which has resolved.  His oral intake has been poor and he is lost about 20 pounds in surgery but this is improved over the last couple of days.  He has had issues with constipation.  He has had issues with abdominal pain, no longer on opioid pain medications using acetaminophen.  Benign exam.  I would like to do an abdominal x-ray as I suspect he might be a little bit backed up and we can evaluate for this.  Encouraged to use MiraLAX daily along with 1 senna twice a day.  He will be starting physical therapy/rehab.  - CBC with platelets; Future    2. Lightheadedness  There is increased oral and fluid intake.  No changes to medications  - XR Chest 2 Views; Future  - EKG 12-lead, tracing only    3. CAD, MI 2002 with stent  No chest pain or shortness of breath, describing lightheadedness not exertional dyspnea.  Will continue to monitor and could always repeat an echo if he continues to feel unwell4. Abdominal pain, generalized  - UA Macroscopic with reflex to Microscopic and Culture - Lab Collect; Future  - XR Abdomen 1 View; Future    5. Constipation, unspecified constipation type  - XR Abdomen 1 View; Future    6. Hyperlipemia, mixed    The longitudinal plan of care for the diagnosis(es)/condition(s) as documented were addressed during this visit. Due to the added complexity in care, I will continue to support Asael in the subsequent management and with ongoing continuity of care.    Total time 40 minutes including chart review, history, examination, counseling, discussion with his wife and his granddaughter    Lissett Vyas is a 79 year old, presenting for the following health issues:  Follow Up (Post procedure issues.He  "is constipated and having stomach/wound/incision discomfort. He has lost considerable weight. He is very fatigued. Granddaughter is concerned with his post op and is there more to his pain?)    HPI     Hospital Follow-up Visit:    Hospital/Nursing Home/IP Rehab Facility:  [facility]  Date of Admission: 12/24/24  Date of Discharge: 12/30/24  Reason(s) for Admission: Juxtarenal abdominal aortic aneurysm  Was the patient in the ICU or did the patient experience delirium during hospitalization?  No  Do you have any other stressors you would like to discuss with your provider? No    Problems taking medications regularly:  None  Medication changes since discharge: None  Problems adhering to non-medication therapy:  None    Summary of hospitalization:  CareEverywhere information obtained and reviewed  Diagnostic Tests/Treatments reviewed.  Follow up needed: Yes  Other Healthcare Providers Involved in Patient s Care:         Yes  Update since discharge: stable.         Plan of care communicated with patient and family                 Objective    /74 (BP Location: Left arm, Patient Position: Sitting, Cuff Size: Adult Regular)   Pulse 87   Temp 97.5  F (36.4  C) (Tympanic)   Resp 15   Ht 1.88 m (6' 2.02\")   Wt 78.5 kg (173 lb)   SpO2 100%   BMI 22.20 kg/m    Body mass index is 22.2 kg/m .  Physical Exam   Patient is awake and alert, conversant, no confusion.  His heart rate is controlled and regular, lungs are clear to auscultation bilaterally.  His incision is clean dry and intact and his abdomen is soft nontender.  He has no edema and robust bilateral dorsal pedal pulses.          Signed Electronically by: Calixto Mancilla MD    "

## 2025-01-15 NOTE — PROGRESS NOTES
[facility] UROLOGY OUTPATIENT     I reviewed this patients records. He was recently seen by Dr. Joel on 12/17/24 after the renal mass was discovered on CT Urogram. Dr. Gonzalez reviewed the case with the patient and at the time, the patient had a AAA that required repair. Dr. Joel encouraged him to get this done before proceding further with the renal mass. He recommend follow up in 3 months with CT renal protocol. The patient underwent the AAA. His appointment with me on 1/20/25 was rescheduled for 3 months with CT renal protocol with Dr. Joel. CT renal ordered.     Jo-Ann Jewell PA-C  [facility] Urology

## 2025-01-16 LAB — BACTERIA UR CULT: NORMAL

## 2025-01-29 ENCOUNTER — OFFICE VISIT (OUTPATIENT)
Dept: INTERNAL MEDICINE | Facility: CLINIC | Age: 80
End: 2025-01-29
Payer: MEDICARE

## 2025-01-29 VITALS
DIASTOLIC BLOOD PRESSURE: 65 MMHG | WEIGHT: 175.4 LBS | SYSTOLIC BLOOD PRESSURE: 111 MMHG | BODY MASS INDEX: 22.51 KG/M2 | HEART RATE: 81 BPM | HEIGHT: 74 IN | OXYGEN SATURATION: 98 % | TEMPERATURE: 98.6 F | RESPIRATION RATE: 14 BRPM

## 2025-01-29 DIAGNOSIS — I71.40 ABDOMINAL AORTIC ANEURYSM (AAA) GREATER THAN 5.5 CM IN DIAMETER IN MALE: Primary | ICD-10-CM

## 2025-01-29 DIAGNOSIS — N40.1 BPH WITH URINARY OBSTRUCTION: ICD-10-CM

## 2025-01-29 DIAGNOSIS — K91.89 POSTOPERATIVE ILEUS (H): ICD-10-CM

## 2025-01-29 DIAGNOSIS — I25.10 ATHEROSCLEROSIS OF NATIVE CORONARY ARTERY OF NATIVE HEART WITHOUT ANGINA PECTORIS: ICD-10-CM

## 2025-01-29 DIAGNOSIS — N13.8 BPH WITH URINARY OBSTRUCTION: ICD-10-CM

## 2025-01-29 DIAGNOSIS — K56.7 POSTOPERATIVE ILEUS (H): ICD-10-CM

## 2025-01-29 DIAGNOSIS — N28.89 RIGHT RENAL MASS: ICD-10-CM

## 2025-01-29 PROCEDURE — G2211 COMPLEX E/M VISIT ADD ON: HCPCS | Performed by: INTERNAL MEDICINE

## 2025-01-29 PROCEDURE — 99214 OFFICE O/P EST MOD 30 MIN: CPT | Performed by: INTERNAL MEDICINE

## 2025-01-29 RX ORDER — ROSUVASTATIN CALCIUM 10 MG/1
10 TABLET, COATED ORAL DAILY
Qty: 90 TABLET | Refills: 4 | Status: SHIPPED | OUTPATIENT
Start: 2025-01-29

## 2025-01-29 RX ORDER — TAMSULOSIN HYDROCHLORIDE 0.4 MG/1
0.4 CAPSULE ORAL EVERY EVENING
Qty: 90 CAPSULE | Refills: 4 | Status: SHIPPED | OUTPATIENT
Start: 2025-01-29

## 2025-01-29 ASSESSMENT — PAIN SCALES - GENERAL: PAINLEVEL_OUTOF10: MODERATE PAIN (4)

## 2025-01-29 NOTE — PROGRESS NOTES
Office Visit - Follow Up   Asael Crump   79 year old male    Date of Visit: 1/29/2025    Chief Complaint   Patient presents with    Follow Up     Post- surgery follow up. (Surgery date 12/24/25 - OPEN JUXTARENAL ABDOMINAL AORTIC ANEURYSM REPAIR, [facility] )    Abdominal Pain     Site of Incision pain range from 4  to 8/ 10.       Recheck Medication     Please review pended refills.         Assessment and Plan   1. AAA, s/p open repair 12/24, Dr. Watters (Primary)  Continues to do well, has follow-up imaging I believe CT angiogram of the abdomen pelvis scheduled next week along with some peripheral vascular studies.    2. Right renal mass  He also has a CT renal mass CT scheduled in a few weeks.  I have messaged Dr. Johnson to see if he can get the information he needs from the above CT ordered by Dr. Dm Watters.  Otherwise he will proceed with the CT as well    3. Postoperative ileus (H)  Improving, no evidence of small bowel obstruction clinically, continue with MiraLAX and senna    4. BPH with urinary obstruction  - tamsulosin (FLOMAX) 0.4 MG capsule; Take 1 capsule (0.4 mg) by mouth every evening.  Dispense: 90 capsule; Refill: 4    5. Atherosclerosis of native coronary artery of native heart without angina pectoris  - rosuvastatin (CRESTOR) 10 MG tablet; Take 1 tablet (10 mg) by mouth daily.  Dispense: 90 tablet; Refill: 4    Return in about 4 weeks (around 2/26/2025) for Follow up.     History of Present Illness   This 79 year old man comes in for follow-up.  He continues to do quite well after his open AAA repair.  He was having a lot of abdominal pain and x-ray showing a large stool burden.  He has been taking senna and MiraLAX and this is improved.  With some right lower quadrant abdominal pain but improving.  His weight is up a few pounds and tolerating diet.       Physical Exam   General Appearance:   No acute distress    /65 (BP Location: Left arm, Patient Position: Sitting, Cuff Size:  "Adult Regular)   Pulse 81   Temp 98.6  F (37  C) (Temporal)   Resp 14   Ht 1.88 m (6' 2.02\")   Wt 79.6 kg (175 lb 6.4 oz)   SpO2 98%   BMI 22.51 kg/m      Heart rate controlled rhythm regular lungs clear to auscultation with, abdomen soft and minimal tenderness palpation of the right lower quadrant     Additional Information   Current Outpatient Medications   Medication Sig Dispense Refill    aspirin 81 MG EC tablet [ASPIRIN 81 MG EC TABLET] Take 81 mg by mouth daily.      atenolol (TENORMIN) 25 MG tablet TAKE 1 TABLET BY MOUTH EVERY DAY 90 tablet 2    cholecalciferol, vitamin D3, (VITAMIN D3) 2,000 unit cap [CHOLECALCIFEROL, VITAMIN D3, (VITAMIN D3) 2,000 UNIT CAP] Take by mouth daily.      cyanocobalamin (VITAMIN B-12) 1000 MCG tablet Take 1,000 mcg by mouth daily      rosuvastatin (CRESTOR) 10 MG tablet Take 1 tablet (10 mg) by mouth daily. 90 tablet 4    tamsulosin (FLOMAX) 0.4 MG capsule Take 1 capsule (0.4 mg) by mouth every evening. 90 capsule 4    fluticasone (FLONASE) 50 MCG/ACT nasal spray Spray 1 spray into both nostrils daily. (Patient not taking: Reported on 1/29/2025) 16 g 5       Time:     The longitudinal plan of care for the diagnosis(es)/condition(s) as documented were addressed during this visit. Due to the added complexity in care, I will continue to support Asael in the subsequent management and with ongoing continuity of care.     Calixto Mancilla MD  Answers submitted by the patient for this visit:  General Questionnaire (Submitted on 1/29/2025)  Chief Complaint: Chronic problems general questions HPI Form  What is the reason for your visit today? : follow up after surgery  How many servings of fruits and vegetables do you eat daily?: 2-3  On average, how many sweetened beverages do you drink each day (Examples: soda, juice, sweet tea, etc.  Do NOT count diet or artificially sweetened beverages)?: 0  How many minutes a day do you exercise enough to make your heart beat faster?: 9 or " less  How many days a week do you exercise enough to make your heart beat faster?: 3 or less  How many days per week do you miss taking your medication?: 0  Questionnaire about: Chronic problems general questions HPI Form (Submitted on 1/29/2025)  Chief Complaint: Chronic problems general questions HPI Form

## 2025-01-29 NOTE — Clinical Note
Asael Haynes Dr. is scheduled for a follow-up CT angiogram of the abdomen pelvis with Dr. Dm Watters.  This is going to be next week and then he has a CT renal mass CT scan scheduled for you.  I just wanted to check and see if you can get the data you need from his CT with contrast from Dr. Watters or if you need him to also get the CT renal mass protocol.  Thanks!  Nam Mancilla

## 2025-02-24 ENCOUNTER — HOSPITAL ENCOUNTER (OUTPATIENT)
Dept: CT IMAGING | Facility: CLINIC | Age: 80
Discharge: HOME | End: 2025-02-24
Attending: PHYSICIAN ASSISTANT | Admitting: PHYSICIAN ASSISTANT
Payer: MEDICARE

## 2025-02-24 DIAGNOSIS — N28.89 RENAL MASS: ICD-10-CM

## 2025-02-24 LAB
CREAT BLD-MCNC: 0.9 MG/DL (ref 0.7–1.3)
EGFRCR SERPLBLD CKD-EPI 2021: >60 ML/MIN/1.73M2

## 2025-02-24 PROCEDURE — 250N000011 HC RX IP 250 OP 636: Performed by: PHYSICIAN ASSISTANT

## 2025-02-24 PROCEDURE — 74178 CT ABD&PLV WO CNTR FLWD CNTR: CPT

## 2025-02-24 PROCEDURE — 82565 ASSAY OF CREATININE: CPT

## 2025-02-24 RX ORDER — IOPAMIDOL 755 MG/ML
90 INJECTION, SOLUTION INTRAVASCULAR ONCE
Status: COMPLETED | OUTPATIENT
Start: 2025-02-24 | End: 2025-02-24

## 2025-02-24 RX ADMIN — IOPAMIDOL 90 ML: 755 INJECTION, SOLUTION INTRAVENOUS at 13:18

## 2025-03-04 ENCOUNTER — OFFICE VISIT (OUTPATIENT)
Dept: SURGERY | Facility: HOSPITAL | Age: 80
End: 2025-03-04
Attending: STUDENT IN AN ORGANIZED HEALTH CARE EDUCATION/TRAINING PROGRAM
Payer: MEDICARE

## 2025-03-04 VITALS
WEIGHT: 172 LBS | DIASTOLIC BLOOD PRESSURE: 65 MMHG | SYSTOLIC BLOOD PRESSURE: 118 MMHG | OXYGEN SATURATION: 98 % | BODY MASS INDEX: 22.07 KG/M2 | HEART RATE: 60 BPM | HEIGHT: 74 IN | RESPIRATION RATE: 16 BRPM

## 2025-03-04 DIAGNOSIS — C64.1 MALIGNANT NEOPLASM OF KIDNEY EXCLUDING RENAL PELVIS, RIGHT (H): Primary | ICD-10-CM

## 2025-03-04 PROCEDURE — 1125F AMNT PAIN NOTED PAIN PRSNT: CPT | Performed by: STUDENT IN AN ORGANIZED HEALTH CARE EDUCATION/TRAINING PROGRAM

## 2025-03-04 PROCEDURE — 3078F DIAST BP <80 MM HG: CPT | Performed by: STUDENT IN AN ORGANIZED HEALTH CARE EDUCATION/TRAINING PROGRAM

## 2025-03-04 PROCEDURE — 99214 OFFICE O/P EST MOD 30 MIN: CPT | Performed by: STUDENT IN AN ORGANIZED HEALTH CARE EDUCATION/TRAINING PROGRAM

## 2025-03-04 PROCEDURE — 3074F SYST BP LT 130 MM HG: CPT | Performed by: STUDENT IN AN ORGANIZED HEALTH CARE EDUCATION/TRAINING PROGRAM

## 2025-03-04 PROCEDURE — G0463 HOSPITAL OUTPT CLINIC VISIT: HCPCS | Performed by: STUDENT IN AN ORGANIZED HEALTH CARE EDUCATION/TRAINING PROGRAM

## 2025-03-04 RX ORDER — CEFAZOLIN SODIUM 2 G/50ML
2 SOLUTION INTRAVENOUS
OUTPATIENT
Start: 2025-03-04

## 2025-03-04 RX ORDER — HEPARIN SODIUM 5000 [USP'U]/.5ML
5000 INJECTION, SOLUTION INTRAVENOUS; SUBCUTANEOUS
OUTPATIENT
Start: 2025-03-04

## 2025-03-04 RX ORDER — ACETAMINOPHEN 500 MG
1000 TABLET ORAL EVERY 6 HOURS PRN
COMMUNITY
Start: 2024-12-30

## 2025-03-04 RX ORDER — ACETAMINOPHEN 325 MG/1
975 TABLET ORAL ONCE
OUTPATIENT
Start: 2025-03-04 | End: 2025-03-04

## 2025-03-04 RX ORDER — ACETAMINOPHEN 650 MG/1
650 SUPPOSITORY RECTAL ONCE
OUTPATIENT
Start: 2025-03-04

## 2025-03-04 RX ORDER — CEFAZOLIN SODIUM 2 G/50ML
2 SOLUTION INTRAVENOUS SEE ADMIN INSTRUCTIONS
OUTPATIENT
Start: 2025-03-04

## 2025-03-04 ASSESSMENT — PAIN SCALES - GENERAL: PAINLEVEL_OUTOF10: MILD PAIN (2)

## 2025-03-04 NOTE — NURSING NOTE
"Urology Rooming Note    March 4, 2025 11:40 AM   Asael Crump is a 79 year old male who presents for:    Chief Complaint   Patient presents with    RECHECK     CT results     Initial Vitals: /65 (BP Location: Left arm, Patient Position: Sitting, Cuff Size: Adult Regular)   Pulse 60   Resp 16   Ht 1.88 m (6' 2\")   Wt 78 kg (172 lb)   SpO2 98%   BMI 22.08 kg/m   Estimated body mass index is 22.08 kg/m  as calculated from the following:    Height as of this encounter: 1.88 m (6' 2\").    Weight as of this encounter: 78 kg (172 lb). Body surface area is 2.02 meters squared.  Mild Pain (2) Comment: Data Unavailable     Allergies reviewed: Yes  Medications reviewed: Yes    Medications: Medication refills not needed today.  Pharmacy name entered into [employer]: [id number] - [place], [place] - [address]      Rosalinda Avila   "

## 2025-03-04 NOTE — LETTER
"3/4/2025      Asael Crump  [address] [place] [address]      Dear Colleague,    Thank you for referring your patient, Asael Crump, to the [facility]. Please see a copy of my visit note below.    CHIEF COMPLAINT   It was my pleasure to see Asael Crump who is a 80 year old male for follow-up of right lower pole kidney mass.      HPI:  Asael Crump is a 80 year old male being seen for follow-up and discussion of surgical treatment.  Duration of problem: 3 months  Previous treatments: Has had a AAA repair recently     accompanied by his spouse  Reviewed previous notes from Dr. Mancilla  Denies any significant symptoms  Seems to have adequately recovered from his AAA surgery in December    Exam:  /65 (BP Location: Left arm, Patient Position: Sitting, Cuff Size: Adult Regular)   Pulse 60   Resp 16   Ht 1.88 m (6' 2\")   Wt 78 kg (172 lb)   SpO2 98%   BMI 22.08 kg/m    General: age-appropriate appearing male in NAD sitting in an exam chair  Resp: no respiratory distress  CV: heart rate regular  Abdomen: Degree of obesity is none. Abdomen is soft and nontender. No organomegaly. Surgical scars include midline laparotomy scar with good healing.  : not performed  Neuro: grossly non focal. Normal reflexes  Motor: excellent strength throughout    Review of Imaging:  The following imaging exams were independently viewed and interpreted by me and discussed with patient:  EXAM: CT RENAL MASS WO and W CONTRAST  LOCATION: [facility]  DATE: 2/24/2025     INDICATION: known right sided renal mass  COMPARISON: CTA chest abdomen pelvis 12/12/2024  TECHNIQUE: CT scan of the abdomen using renal mass protocol with arterial, portal venous, and delayed images. Multiplanar reformats were obtained. Dose reduction techniques were used.  CONTRAST: Isovue 370 90mL     FINDINGS:     LOWER CHEST: Coronary artery calcifications.     HEPATOBILIARY: Normal.   "   PANCREAS: Normal.     SPLEEN: Normal.     ADRENAL GLANDS: Normal.     RIGHT KIDNEY/URETER: A stable solid, enhancing mass in the medial lower pole measures 3.7 x 3.2 x 3.4 cm. No other suspicious right renal lesion, stone, or hydronephrosis.     LEFT KIDNEY/URETER: No suspicious lesion, stone, or hydronephrosis.     BOWEL: No obstruction or inflammatory change.     LYMPH NODES: No lymphadenopathy.     VASCULATURE: Interval repair of the juxtarenal abdominal aortic aneurysm. The excluded aneurysm sac has decreased in size and now measures up to 6.3 x 4.7 cm over a craniocaudal length of 10.5 cm. Right common iliac artery aneurysm measuring 2.2 x 2.1   cm.     MUSCULOSKELETAL: No aggressive osseous lesion.                                                                      IMPRESSION:  1.  Stable solid and enhancing right lower pole renal mass, most suggestive of renal cell carcinoma.  2.  No evidence of abdominal metastatic disease.  3.  Interval repair of the juxtarenal abdominal aortic aneurysm with decreasing size of the excluded aneurysm sac.  4.  Right common iliac artery aneurysm.    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  Based on the appearance on the CT I am not sure if this is of mass which is feasible for partial nephrectomy or even for renal ablation based on its appearance  In view of this and the fact that he is a good surgical candidate with a good GFR I think we can consider for a radical nephrectomy in the setting.  Based on his recent AAA repair and transperitoneal approach for the same we can attempt for a retroperitoneal robot-assisted nephrectomy.  I also explained to him that there is a reasonable chance that we may have to do this open but in the majority of situations we should be able to complete this procedure without any issues through the retroperitoneal approach.  I discussed the details of the surgical approach, hospital stay, procedure details, postoperative recovery and follow-up plan with Asael and his wife.  Most likely hospital stay will be for 1 to 2 days and may need a drain in the abdomen which we tend to remove with on postoperative day 1.  Most common reason for patients to stay longer than 1 to 2 days is postoperative ileus which is significantly much less if we can complete this procedure with a retroperitoneal approach.  If we have to do this via open approach he might stay longer for pain control.  We also discussed about the likely issues arising out of the surgery which include blood loss need for transfusion about 5%, wound infection, chest infection, urinary tract infection infected collections, ileus/bowel obstruction, DVT, PE, and rare complications of surrounding organ injury to the liver, bowel as well as large blood vessels.  Based on the discussions we had they were agreeable to proceed ahead with the planned procedure.  I will have him scheduled for the same on the next available date  He feels that he is adequately recovered from his AAA surgery and is agreeable to proceed ahead with right robot-assisted  retroperitoneal nephrectomy  - Case Request: NEPHRECTOMY, ROBOT-ASSISTED, retroperitoneal; Standing  - Case Request: NEPHRECTOMY, ROBOT-ASSISTED, retroperitoneal      Morgan Joel MD  [facility] within [facility]      ==========================    Additional Billing and Coding Information:  Review of external notes as documented above   Review of the result(s) of each unique test - CT renal mass protocol, urine culture, creatinine                40 minutes spent by me on the date of the encounter doing chart review, review of test results, interpretation of tests, patient visit, documentation, and discussion with family     ==========================      Again, thank you for allowing me to participate in the care of your patient.        Sincerely,        Morgan Joel MD    Electronically signed

## 2025-03-05 ENCOUNTER — OFFICE VISIT (OUTPATIENT)
Dept: INTERNAL MEDICINE | Facility: CLINIC | Age: 80
End: 2025-03-05
Payer: MEDICARE

## 2025-03-05 VITALS
HEIGHT: 74 IN | BODY MASS INDEX: 22.63 KG/M2 | OXYGEN SATURATION: 98 % | RESPIRATION RATE: 14 BRPM | DIASTOLIC BLOOD PRESSURE: 68 MMHG | TEMPERATURE: 97.5 F | WEIGHT: 176.3 LBS | HEART RATE: 65 BPM | SYSTOLIC BLOOD PRESSURE: 115 MMHG

## 2025-03-05 DIAGNOSIS — I25.10 ATHEROSCLEROSIS OF NATIVE CORONARY ARTERY OF NATIVE HEART WITHOUT ANGINA PECTORIS: ICD-10-CM

## 2025-03-05 DIAGNOSIS — N28.89 RIGHT RENAL MASS: ICD-10-CM

## 2025-03-05 DIAGNOSIS — I71.40 ABDOMINAL AORTIC ANEURYSM (AAA) GREATER THAN 5.5 CM IN DIAMETER IN MALE: Primary | ICD-10-CM

## 2025-03-05 PROCEDURE — 1126F AMNT PAIN NOTED NONE PRSNT: CPT | Performed by: INTERNAL MEDICINE

## 2025-03-05 PROCEDURE — 3078F DIAST BP <80 MM HG: CPT | Performed by: INTERNAL MEDICINE

## 2025-03-05 PROCEDURE — 3074F SYST BP LT 130 MM HG: CPT | Performed by: INTERNAL MEDICINE

## 2025-03-05 PROCEDURE — G2211 COMPLEX E/M VISIT ADD ON: HCPCS | Performed by: INTERNAL MEDICINE

## 2025-03-05 PROCEDURE — 99214 OFFICE O/P EST MOD 30 MIN: CPT | Performed by: INTERNAL MEDICINE

## 2025-03-05 RX ORDER — POLYETHYLENE GLYCOL 3350 17 G/17G
1 POWDER, FOR SOLUTION ORAL DAILY
COMMUNITY

## 2025-03-05 RX ORDER — SENNOSIDES A AND B 8.6 MG/1
2 TABLET, FILM COATED ORAL DAILY
COMMUNITY

## 2025-03-05 ASSESSMENT — PAIN SCALES - GENERAL: PAINLEVEL_OUTOF10: NO PAIN (0)

## 2025-03-05 NOTE — PROGRESS NOTES
"  Office Visit - Follow Up   Asael Crump   79 year old male    Date of Visit: 3/5/2025    Chief Complaint   Patient presents with    RECHECK        Assessment and Plan   1. AAA, s/p open repair 12/24, Dr. Watters (Primary)  He continues to do well, continue postoperative management per surgery and surveillance    2. CAD, MI 2002 with stent  Continue secondary prevention    3. Right renal mass  He is planning to have a robotic assisted right sided nephrectomy at the end of March.  Once his surgery is scheduled he will schedule a preoperative examination with me.    Return in about 2 weeks (around 3/19/2025) for Follow up, preop.     History of Present Illness   This 79 year old man comes in for follow-up.  He continues to recover well from his open AAA surgery.  Still with a little bit of incisional abdominal pain.  Bowel movements are regular.  Recently had CT scan which showed stable renal mass and tentatively has robotic assisted nephrectomy scheduled for the end of March.       Physical Exam   General Appearance:   No acute distress    /68 (BP Location: Left arm, Patient Position: Sitting, Cuff Size: Adult Regular)   Pulse 65   Temp 97.5  F (36.4  C) (Tympanic)   Resp 14   Ht 1.88 m (6' 2\")   Wt 80 kg (176 lb 4.8 oz)   SpO2 98%   BMI 22.64 kg/m           Additional Information   Current Outpatient Medications   Medication Sig Dispense Refill    acetaminophen (TYLENOL) 500 MG tablet Take 1,000 mg by mouth every 6 hours as needed.      aspirin 81 MG EC tablet [ASPIRIN 81 MG EC TABLET] Take 81 mg by mouth daily.      atenolol (TENORMIN) 25 MG tablet TAKE 1 TABLET BY MOUTH EVERY DAY 90 tablet 2    cholecalciferol, vitamin D3, (VITAMIN D3) 2,000 unit cap [CHOLECALCIFEROL, VITAMIN D3, (VITAMIN D3) 2,000 UNIT CAP] Take by mouth daily.      cyanocobalamin (VITAMIN B-12) 1000 MCG tablet Take 1,000 mcg by mouth daily      polyethylene glycol (MIRALAX) 17 g packet Take 1 packet by mouth daily.      " rosuvastatin (CRESTOR) 10 MG tablet Take 1 tablet (10 mg) by mouth daily. 90 tablet 4    senna (SENOKOT) 8.6 MG tablet Take 2 tablets by mouth daily.      tamsulosin (FLOMAX) 0.4 MG capsule Take 1 capsule (0.4 mg) by mouth every evening. 90 capsule 4    fluticasone (FLONASE) 50 MCG/ACT nasal spray Spray 1 spray into both nostrils daily. (Patient not taking: Reported on 3/5/2025) 16 g 5       Time:     The longitudinal plan of care for the diagnosis(es)/condition(s) as documented were addressed during this visit. Due to the added complexity in care, I will continue to support Asael in the subsequent management and with ongoing continuity of care.     Calixto Mancilla MD  Answers submitted by the patient for this visit:  General Questionnaire (Submitted on 3/5/2025)  Chief Complaint: Chronic problems general questions HPI Form  What is the reason for your visit today? : upcoming kidney operation  How many servings of fruits and vegetables do you eat daily?: 2-3  On average, how many sweetened beverages do you drink each day (Examples: soda, juice, sweet tea, etc.  Do NOT count diet or artificially sweetened beverages)?: 0  How many minutes a day do you exercise enough to make your heart beat faster?: 9 or less  How many days a week do you exercise enough to make your heart beat faster?: 3 or less  How many days per week do you miss taking your medication?: 0  Questionnaire about: Chronic problems general questions HPI Form (Submitted on 3/5/2025)  Chief Complaint: Chronic problems general questions HPI Form

## 2025-03-06 ENCOUNTER — TELEPHONE (OUTPATIENT)
Dept: ONCOLOGY | Facility: HOSPITAL | Age: 80
End: 2025-03-06
Payer: MEDICARE

## 2025-03-06 ENCOUNTER — PATIENT OUTREACH (OUTPATIENT)
Dept: ONCOLOGY | Facility: HOSPITAL | Age: 80
End: 2025-03-06
Payer: MEDICARE

## 2025-03-06 NOTE — TELEPHONE ENCOUNTER
I received a phone call today from patient's wife, Christen.  She is calling to get the date and time of Asael surgery with Dr. Joel.  Asael was in today to see his primary care doctor and was told that it appears that surgery is scheduled.  Christen states they have gotten no information about this.  She can be reached at [phone].    Patience Bhagat RN on 3/6/2025 at 3:32 PM

## 2025-03-06 NOTE — PROGRESS NOTES
Return call to Asael Vleásquez's wife. Consent to communicate on file.     Christen states that while at PCP, they were told Asael's surgery has been scheduled. Informed Christen that it does not appear to be officially scheduled from what I can see. Surgery scheduler should be reaching out to confirm information prior to scheduling. Christen verbalized understanding. Encouraged Christen/Asael to call clinic with any further questions or concerns. Christen agreeable to this plan.     Aurea Stanford RN  03/06/25  4:09 PM

## 2025-03-11 NOTE — PROGRESS NOTES
"CHIEF COMPLAINT   It was my pleasure to see Asael Crump who is a 80 year old male for follow-up of right lower pole kidney mass.      HPI:  Asael Crump is a 80 year old male being seen for follow-up and discussion of surgical treatment.  Duration of problem: 3 months  Previous treatments: Has had a AAA repair recently     accompanied by his spouse  Reviewed previous notes from Dr. Mancilla  Denies any significant symptoms  Seems to have adequately recovered from his AAA surgery in December    Exam:  /65 (BP Location: Left arm, Patient Position: Sitting, Cuff Size: Adult Regular)   Pulse 60   Resp 16   Ht 1.88 m (6' 2\")   Wt 78 kg (172 lb)   SpO2 98%   BMI 22.08 kg/m    General: age-appropriate appearing male in NAD sitting in an exam chair  Resp: no respiratory distress  CV: heart rate regular  Abdomen: Degree of obesity is none. Abdomen is soft and nontender. No organomegaly. Surgical scars include midline laparotomy scar with good healing.  : not performed  Neuro: grossly non focal. Normal reflexes  Motor: excellent strength throughout    Review of Imaging:  The following imaging exams were independently viewed and interpreted by me and discussed with patient:  EXAM: CT RENAL MASS WO and W CONTRAST  LOCATION: [facility]  DATE: 2/24/2025     INDICATION: known right sided renal mass  COMPARISON: CTA chest abdomen pelvis 12/12/2024  TECHNIQUE: CT scan of the abdomen using renal mass protocol with arterial, portal venous, and delayed images. Multiplanar reformats were obtained. Dose reduction techniques were used.  CONTRAST: Isovue 370 90mL     FINDINGS:     LOWER CHEST: Coronary artery calcifications.     HEPATOBILIARY: Normal.     PANCREAS: Normal.     SPLEEN: Normal.     ADRENAL GLANDS: Normal.     RIGHT KIDNEY/URETER: A stable solid, enhancing mass in the medial lower pole measures 3.7 x 3.2 x 3.4 cm. No other suspicious right renal lesion, stone, or hydronephrosis.   "   LEFT KIDNEY/URETER: No suspicious lesion, stone, or hydronephrosis.     BOWEL: No obstruction or inflammatory change.     LYMPH NODES: No lymphadenopathy.     VASCULATURE: Interval repair of the juxtarenal abdominal aortic aneurysm. The excluded aneurysm sac has decreased in size and now measures up to 6.3 x 4.7 cm over a craniocaudal length of 10.5 cm. Right common iliac artery aneurysm measuring 2.2 x 2.1   cm.     MUSCULOSKELETAL: No aggressive osseous lesion.                                                                      IMPRESSION:  1.  Stable solid and enhancing right lower pole renal mass, most suggestive of renal cell carcinoma.  2.  No evidence of abdominal metastatic disease.  3.  Interval repair of the juxtarenal abdominal aortic aneurysm with decreasing size of the excluded aneurysm sac.  4.  Right common iliac artery aneurysm.    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  Based on the appearance on the CT I am not sure if this is of mass which is feasible for partial nephrectomy or even for renal ablation based on its appearance  In view of  this and the fact that he is a good surgical candidate with a good GFR I think we can consider for a radical nephrectomy in the setting.  Based on his recent AAA repair and transperitoneal approach for the same we can attempt for a retroperitoneal robot-assisted nephrectomy.  I also explained to him that there is a reasonable chance that we may have to do this open but in the majority of situations we should be able to complete this procedure without any issues through the retroperitoneal approach.  I discussed the details of the surgical approach, hospital stay, procedure details, postoperative recovery and follow-up plan with Asael and his wife.  Most likely hospital stay will be for 1 to 2 days and may need a drain in the abdomen which we tend to remove with on postoperative day 1.  Most common reason for patients to stay longer than 1 to 2 days is postoperative ileus which is significantly much less if we can complete this procedure with a retroperitoneal approach.  If we have to do this via open approach he might stay longer for pain control.  We also discussed about the likely issues arising out of the surgery which include blood loss need for transfusion about 5%, wound infection, chest infection, urinary tract infection infected collections, ileus/bowel obstruction, DVT, PE, and rare complications of surrounding organ injury to the liver, bowel as well as large blood vessels.  Based on the discussions we had they were agreeable to proceed ahead with the planned procedure.  I will have him scheduled for the same on the next available date  He feels that he is adequately recovered from his AAA surgery and is agreeable to proceed ahead with right robot-assisted retroperitoneal nephrectomy  - Case Request: NEPHRECTOMY, ROBOT-ASSISTED, retroperitoneal; Standing  - Case Request: NEPHRECTOMY, ROBOT-ASSISTED, retroperitoneal      Morgan Joel MD  [facility]  [place]      ==========================    Additional Billing and Coding Information:  Review of external notes as documented above   Review of the result(s) of each unique test - CT renal mass protocol, urine culture, creatinine                40 minutes spent by me on the date of the encounter doing chart review, review of test results, interpretation of tests, patient visit, documentation, and discussion with family     ==========================

## 2025-03-17 ENCOUNTER — TELEPHONE (OUTPATIENT)
Dept: ONCOLOGY | Facility: HOSPITAL | Age: 80
End: 2025-03-17
Payer: MEDICARE

## 2025-03-17 NOTE — TELEPHONE ENCOUNTER
[facility]/Voicemail     Outreach attempted x 1. Unable to contact. Left voicemail asking Asael to return call to clinic. Contact information provided.     Of Note: Return call to patient/wife to answer questions.    Aurea Stanford RN  03/17/25  1:22 PM

## 2025-03-17 NOTE — TELEPHONE ENCOUNTER
Patient's wife calls with questions regarding upcoming surgery with Dr. Joel and follow up afterwards.    Message will be sent to Aurea Stanford RNCC to follow up with patient and wife.    Agnes Concepcion RN

## 2025-03-17 NOTE — TELEPHONE ENCOUNTER
Incoming call from Mariely. Mariely asks about rescheduling post-op appointment with Dr. Joel to [facility]. Message to Dr. Joel to verify okay to adjust post-op per patient request. Per Dr. Joel, okay to be seen for post-op at [facility]. [facility] rescheduled as requested.     Aurea Stanford RN  03/17/25  3:09 PM

## 2025-03-18 ENCOUNTER — TELEPHONE (OUTPATIENT)
Dept: UROLOGY | Facility: CLINIC | Age: 80
End: 2025-03-18
Payer: MEDICARE

## 2025-03-19 ENCOUNTER — OFFICE VISIT (OUTPATIENT)
Dept: INTERNAL MEDICINE | Facility: CLINIC | Age: 80
End: 2025-03-19
Payer: MEDICARE

## 2025-03-19 VITALS
TEMPERATURE: 97.1 F | HEART RATE: 52 BPM | OXYGEN SATURATION: 98 % | DIASTOLIC BLOOD PRESSURE: 64 MMHG | HEIGHT: 74 IN | BODY MASS INDEX: 22.72 KG/M2 | SYSTOLIC BLOOD PRESSURE: 126 MMHG | RESPIRATION RATE: 17 BRPM | WEIGHT: 177 LBS

## 2025-03-19 DIAGNOSIS — E78.2 HYPERLIPEMIA, MIXED: ICD-10-CM

## 2025-03-19 DIAGNOSIS — N13.8 BPH WITH URINARY OBSTRUCTION: ICD-10-CM

## 2025-03-19 DIAGNOSIS — Z86.0100 HISTORY OF COLONIC POLYPS: ICD-10-CM

## 2025-03-19 DIAGNOSIS — N28.89 RIGHT RENAL MASS: ICD-10-CM

## 2025-03-19 DIAGNOSIS — N40.1 BPH WITH URINARY OBSTRUCTION: ICD-10-CM

## 2025-03-19 DIAGNOSIS — I25.10 ATHEROSCLEROSIS OF NATIVE CORONARY ARTERY OF NATIVE HEART WITHOUT ANGINA PECTORIS: ICD-10-CM

## 2025-03-19 DIAGNOSIS — I71.40 ABDOMINAL AORTIC ANEURYSM (AAA) GREATER THAN 5.5 CM IN DIAMETER IN MALE: ICD-10-CM

## 2025-03-19 DIAGNOSIS — Z01.818 PREOPERATIVE EXAMINATION: Primary | ICD-10-CM

## 2025-03-19 DIAGNOSIS — R73.03 PREDIABETES: ICD-10-CM

## 2025-03-19 LAB
ERYTHROCYTE [DISTWIDTH] IN BLOOD BY AUTOMATED COUNT: 13.8 % (ref 10–15)
HCT VFR BLD AUTO: 39.5 % (ref 40–53)
HGB BLD-MCNC: 12.9 G/DL (ref 13.3–17.7)
MCH RBC QN AUTO: 28.7 PG (ref 26.5–33)
MCHC RBC AUTO-ENTMCNC: 32.7 G/DL (ref 31.5–36.5)
MCV RBC AUTO: 88 FL (ref 78–100)
PLATELET # BLD AUTO: 173 10E3/UL (ref 150–450)
RBC # BLD AUTO: 4.5 10E6/UL (ref 4.4–5.9)
WBC # BLD AUTO: 6.9 10E3/UL (ref 4–11)

## 2025-03-19 PROCEDURE — 1126F AMNT PAIN NOTED NONE PRSNT: CPT | Performed by: INTERNAL MEDICINE

## 2025-03-19 PROCEDURE — 3074F SYST BP LT 130 MM HG: CPT | Performed by: INTERNAL MEDICINE

## 2025-03-19 PROCEDURE — 36415 COLL VENOUS BLD VENIPUNCTURE: CPT | Performed by: INTERNAL MEDICINE

## 2025-03-19 PROCEDURE — 3078F DIAST BP <80 MM HG: CPT | Performed by: INTERNAL MEDICINE

## 2025-03-19 PROCEDURE — 99215 OFFICE O/P EST HI 40 MIN: CPT | Performed by: INTERNAL MEDICINE

## 2025-03-19 PROCEDURE — 85027 COMPLETE CBC AUTOMATED: CPT | Performed by: INTERNAL MEDICINE

## 2025-03-19 PROCEDURE — G2211 COMPLEX E/M VISIT ADD ON: HCPCS | Performed by: INTERNAL MEDICINE

## 2025-03-19 ASSESSMENT — PAIN SCALES - GENERAL: PAINLEVEL_OUTOF10: NO PAIN (0)

## 2025-03-19 NOTE — PROGRESS NOTES
Preoperative Consultation   Asael Crump   80 year old  male    Date of visit: 3/19/2025  Physician: Calixto Mancilla MD    This is a preoperative consultation requested by Dr. Joel in preparation for robotic assisted right sided nephrectomy on 03/28/2025 at  [facility] .       Assessment and Plan   Asael Crump was seen in preoperative consultation in preparation for robotic assisted right sided nephrectomy.  This is a intermediate risk surgery and the patient has increased risk for major cardiac complications based on a history of myocardial infarction.  Please note he has a remote history of inferior wall infarct in 2002 with stress test 12/13/2024 that showed no inducible ischemia.  He has a normal ejection fraction of 74%.  He will hold aspirin in preparation for surgery.  He will continue atenolol through surgery.  He does have a history of BPH and is at risk for urinary retention postoperatively.  He will be sure to take tamsulosin as prescribed prior to surgery. He does have a remote smoking history without any chronic respiratory symptoms.   He has no cardiopulmonary contraindication to the proposed procedure may proceed without further cardiopulmonary testing.    1. Preoperative examination    2. Right renal mass    3. AAA, s/p open repair 12/24, Dr. Watters    4. CAD, MI 2002 with stent    5. BPH with urinary obstruction    6. History of colonic polyps - 2024 - done    7. Hyperlipemia, mixed         Patient Profile   Social History     Social History Narrative    Lives with wife, Christen.  Retired , [employer].  Dabbles in [employer].  Two step daughters (Leia and Soledad), granddaughter and great-granddaughter.        Past Medical History   Patient Active Problem List   Diagnosis    Non-seasonal allergic rhinitis due to pollen    Lichen Planus - oral    Hyperlipemia, mixed    CAD, MI 2002 with stent    BPH with urinary obstruction    History of colonic polyps - 2024 - done     AAA, s/p open repair 12/24, Dr. Watters    Right renal mass       Past Surgical History  He has a past surgical history that includes Coronary Stent Placement (01/01/2002); Pr Hallux Rigidus W/Cheilectomy 1St Mp Jt W/O Implt (Bilateral, 05/15/2015); and aaa repair (2025).     History of Present Illness   This 80 year old man comes in for preoperative valuation.  He was found to have a right renal mass after urine analysis showed microscopic hematuria.  Incidentally on evaluation of this he is found to have a large abdominal aortic aneurysm which was repaired electively in December 2024.  He has not recovered from this and is preparing for definitive therapy for his right sided nephroma.  He denies any chest pain or shortness of breath.  Occasional incisional pain which is improving.  No current infectious symptoms.    Recent antiplatelet use: yes aspirin, last dose 3/18/25  Personal or family history of bleeding or clotting disorders: no  Steroid use in the past year: no  Personal or family history of difficulty with anesthesia: no  Current cardiopulmonary symptoms: no  LISA: no    Review of Systems: A comprehensive review of systems was negative except as noted.     Medications and Allergies   Current Outpatient Medications   Medication Sig Dispense Refill    acetaminophen (TYLENOL) 500 MG tablet Take 1,000 mg by mouth every 6 hours as needed.      aspirin 81 MG EC tablet [ASPIRIN 81 MG EC TABLET] Take 81 mg by mouth daily.      atenolol (TENORMIN) 25 MG tablet TAKE 1 TABLET BY MOUTH EVERY DAY 90 tablet 2    cholecalciferol, vitamin D3, (VITAMIN D3) 2,000 unit cap [CHOLECALCIFEROL, VITAMIN D3, (VITAMIN D3) 2,000 UNIT CAP] Take by mouth daily.      cyanocobalamin (VITAMIN B-12) 1000 MCG tablet Take 1,000 mcg by mouth daily      polyethylene glycol (MIRALAX) 17 g packet Take 1 packet by mouth daily.      rosuvastatin (CRESTOR) 10 MG tablet Take 1 tablet (10 mg) by mouth daily. 90 tablet 4    tamsulosin (FLOMAX) 0.4  "MG capsule Take 1 capsule (0.4 mg) by mouth every evening. 90 capsule 4     Allergies   Allergen Reactions    Other Environmental Allergy Unknown     Tree pollen, dust, mold -- sneezing, itchy eyes        Family and Social History   Family History   Problem Relation Age of Onset    Dementia Mother          97    Coronary Artery Disease Father 67    Chronic Obstructive Pulmonary Disease Father     Brain Cancer Sister     Lung Cancer Sister         Social History     Tobacco Use    Smoking status: Former     Current packs/day: 0.00     Types: Cigarettes     Quit date: 2000     Years since quittin.2     Passive exposure: Past    Smokeless tobacco: Never   Vaping Use    Vaping status: Never Used   Substance Use Topics    Alcohol use: Yes     Comment: rare    Drug use: No        Physical Exam   General Appearance:   No acute distress    /64 (BP Location: Left arm, Patient Position: Sitting, Cuff Size: Adult Regular)   Pulse 52   Temp 97.1  F (36.2  C) (Temporal)   Resp 17   Ht 1.88 m (6' 2\")   Wt 80.3 kg (177 lb)   SpO2 98%   BMI 22.73 kg/m      EYES: Eyelids, conjunctiva, and sclera were normal.  HEAD, EARS, NOSE, MOUTH, AND THROAT: Head and face were normal. Hearing was normal to voice and the ears were normal to external exam. Nose appearance was normal and there was no discharge.  NECK: Neck appearance was normal.   RESPIRATORY: Breathing pattern was normal and the chest moved symmetrically.   Lung sounds were normal and there were no abnormal sounds.  CARDIOVASCULAR: Heart rate and rhythm were normal.  S1 and S2 were normal and there were no extra sounds or murmurs.  There was no peripheral edema.  GASTROINTESTINAL: The abdomen was normal in contour and is soft nontender  NEUROLOGIC: The patient was alert and oriented to person, place, time, and circumstance. Speech was normal. Cranial nerves were normal. Motor strength was normal for age. The patient was normally " coordinated.  PSYCHIATRIC:  Mood and affect were normal and the patient had normal recent and remote memory. The patient's judgment and insight were normal.     Additional Tests   Laboratory: BMP and CBC pending    Radiology: reviewed CTs    Electrocardiogram: 1/15/2025, sinus tachycardia, incomplete RBBB, LAFB, personally reviewed    Total time 40 minutes including chart review, history, examination, counseling and documentation    The longitudinal plan of care for the diagnosis(es)/condition(s) as documented were addressed during this visit. Due to the added complexity in care, I will continue to support Asael in the subsequent management and with ongoing continuity of care.       Calixto Mancilla MD  Internal Medicine  Contact me at [phone]

## 2025-03-20 ENCOUNTER — TELEPHONE (OUTPATIENT)
Dept: UROLOGY | Facility: CLINIC | Age: 80
End: 2025-03-20
Payer: MEDICARE

## 2025-03-20 DIAGNOSIS — N40.1 BPH WITH URINARY OBSTRUCTION: Primary | ICD-10-CM

## 2025-03-20 DIAGNOSIS — Z01.818 PREOPERATIVE EXAMINATION: ICD-10-CM

## 2025-03-20 DIAGNOSIS — R73.03 PREDIABETES: ICD-10-CM

## 2025-03-20 DIAGNOSIS — N13.8 BPH WITH URINARY OBSTRUCTION: Primary | ICD-10-CM

## 2025-03-20 LAB
ANION GAP SERPL CALCULATED.3IONS-SCNC: 8 MMOL/L (ref 7–15)
BUN SERPL-MCNC: 17.2 MG/DL (ref 8–23)
CALCIUM SERPL-MCNC: 9.3 MG/DL (ref 8.8–10.4)
CHLORIDE SERPL-SCNC: 105 MMOL/L (ref 98–107)
CREAT SERPL-MCNC: 0.93 MG/DL (ref 0.67–1.17)
EGFRCR SERPLBLD CKD-EPI 2021: 83 ML/MIN/1.73M2
EST. AVERAGE GLUCOSE BLD GHB EST-MCNC: 100 MG/DL
GLUCOSE SERPL-MCNC: 96 MG/DL (ref 70–99)
HBA1C MFR BLD: 5.1 % (ref 0–5.6)
HCO3 SERPL-SCNC: 27 MMOL/L (ref 22–29)
POTASSIUM SERPL-SCNC: 5.1 MMOL/L (ref 3.4–5.3)
SODIUM SERPL-SCNC: 140 MMOL/L (ref 135–145)

## 2025-03-21 ENCOUNTER — PATIENT OUTREACH (OUTPATIENT)
Dept: ONCOLOGY | Facility: HOSPITAL | Age: 80
End: 2025-03-21
Payer: MEDICARE

## 2025-03-21 NOTE — PROGRESS NOTES
Call from Asael Schuster's wife, regarding soap for pre-op showers.     Aurea Stanford RN  03/21/25  10:59 AM

## 2025-03-24 ENCOUNTER — TELEPHONE (OUTPATIENT)
Dept: ONCOLOGY | Facility: HOSPITAL | Age: 80
End: 2025-03-24
Payer: MEDICARE

## 2025-03-24 NOTE — PROGRESS NOTES
PTA medications updated by Medication Scribe prior to surgery via phone call with patient (last doses completed by Nurse)     Medication history sources: Patient, Surescripts, H&P, and Patient's home med list  In the past week, patient estimated taking medication this percent of the time: Greater than 90%      Significant changes made to the medication list:  None      Additional medication history information:   None    Medication reconciliation completed by provider prior to medication history? No    Time spent in this activity: 15 minutes    The information provided in this note is only as accurate as the sources available at the time of update(s)      Prior to Admission medications    Medication Sig Last Dose Taking? Auth Provider Long Term End Date   acetaminophen (TYLENOL) 500 MG tablet Take 1,000 mg by mouth every 6 hours as needed. Unknown Yes Reported, Patient     aspirin 81 MG EC tablet [ASPIRIN 81 MG EC TABLET] Take 81 mg by mouth daily. Unknown Yes Provider, Historical     atenolol (TENORMIN) 25 MG tablet TAKE 1 TABLET BY MOUTH EVERY DAY Unknown Yes Calixto Mancilla MD Yes    cholecalciferol, vitamin D3, (VITAMIN D3) 2,000 unit cap [CHOLECALCIFEROL, VITAMIN D3, (VITAMIN D3) 2,000 UNIT CAP] Take by mouth daily. Unknown Yes Provider, Historical     cyanocobalamin (VITAMIN B-12) 1000 MCG tablet Take 1,000 mcg by mouth daily Unknown Yes Reported, Patient     polyethylene glycol (MIRALAX) 17 g packet Take 1 packet by mouth daily. Unknown Yes Reported, Patient     rosuvastatin (CRESTOR) 10 MG tablet Take 1 tablet (10 mg) by mouth daily. Unknown Yes Calixto Mancilla MD Yes    tamsulosin (FLOMAX) 0.4 MG capsule Take 1 capsule (0.4 mg) by mouth every evening. Unknown Yes Calixto Mancilla MD         Medication history completed by: Carrillo Candelario

## 2025-03-24 NOTE — TELEPHONE ENCOUNTER
Patient's wife Mariely, calls regarding upcoming surgery with Dr. Joel on 3/28. Patient had his pre-op and was given instructions on medications. But the provider did not know if patient could keep taking Miralax up until procedure of if it needed to be held. They were instructed to call and ask urology.    Message will be sent to urology team to advise.    Agnes Concepcion RN

## 2025-03-28 ENCOUNTER — HOSPITAL ENCOUNTER (INPATIENT)
Facility: CLINIC | Age: 80
LOS: 1 days | Discharge: HOME | DRG: 658 | End: 2025-03-29
Attending: STUDENT IN AN ORGANIZED HEALTH CARE EDUCATION/TRAINING PROGRAM | Admitting: STUDENT IN AN ORGANIZED HEALTH CARE EDUCATION/TRAINING PROGRAM
Payer: MEDICARE

## 2025-03-28 DIAGNOSIS — N28.89 RIGHT RENAL MASS: Primary | ICD-10-CM

## 2025-03-28 LAB
ABO + RH BLD: NORMAL
BLD GP AB SCN SERPL QL: NEGATIVE
GLUCOSE SERPL-MCNC: 119 MG/DL (ref 70–99)
SPECIMEN EXP DATE BLD: NORMAL

## 2025-03-28 PROCEDURE — 250N000011 HC RX IP 250 OP 636: Mod: JZ | Performed by: STUDENT IN AN ORGANIZED HEALTH CARE EDUCATION/TRAINING PROGRAM

## 2025-03-28 PROCEDURE — 710N000009 HC RECOVERY PHASE 1, LEVEL 1, PER MIN: Performed by: STUDENT IN AN ORGANIZED HEALTH CARE EDUCATION/TRAINING PROGRAM

## 2025-03-28 PROCEDURE — 50548 LAPARO REMOVE W/URETER: CPT | Mod: RT | Performed by: STUDENT IN AN ORGANIZED HEALTH CARE EDUCATION/TRAINING PROGRAM

## 2025-03-28 PROCEDURE — 120N000001 HC R&B MED SURG/OB

## 2025-03-28 PROCEDURE — 250N000009 HC RX 250: Performed by: STUDENT IN AN ORGANIZED HEALTH CARE EDUCATION/TRAINING PROGRAM

## 2025-03-28 PROCEDURE — 360N000080 HC SURGERY LEVEL 7, PER MIN: Performed by: STUDENT IN AN ORGANIZED HEALTH CARE EDUCATION/TRAINING PROGRAM

## 2025-03-28 PROCEDURE — 999N000141 HC STATISTIC PRE-PROCEDURE NURSING ASSESSMENT: Performed by: STUDENT IN AN ORGANIZED HEALTH CARE EDUCATION/TRAINING PROGRAM

## 2025-03-28 PROCEDURE — 250N000013 HC RX MED GY IP 250 OP 250 PS 637: Performed by: STUDENT IN AN ORGANIZED HEALTH CARE EDUCATION/TRAINING PROGRAM

## 2025-03-28 PROCEDURE — 250N000011 HC RX IP 250 OP 636: Performed by: STUDENT IN AN ORGANIZED HEALTH CARE EDUCATION/TRAINING PROGRAM

## 2025-03-28 PROCEDURE — 86850 RBC ANTIBODY SCREEN: CPT | Performed by: ANESTHESIOLOGY

## 2025-03-28 PROCEDURE — 88307 TISSUE EXAM BY PATHOLOGIST: CPT | Mod: TC | Performed by: STUDENT IN AN ORGANIZED HEALTH CARE EDUCATION/TRAINING PROGRAM

## 2025-03-28 PROCEDURE — 82947 ASSAY GLUCOSE BLOOD QUANT: CPT | Performed by: ANESTHESIOLOGY

## 2025-03-28 PROCEDURE — 250N000025 HC SEVOFLURANE, PER MIN: Performed by: STUDENT IN AN ORGANIZED HEALTH CARE EDUCATION/TRAINING PROGRAM

## 2025-03-28 PROCEDURE — 8E0W4CZ ROBOTIC ASSISTED PROCEDURE OF TRUNK REGION, PERCUTANEOUS ENDOSCOPIC APPROACH: ICD-10-PCS | Performed by: STUDENT IN AN ORGANIZED HEALTH CARE EDUCATION/TRAINING PROGRAM

## 2025-03-28 PROCEDURE — 370N000017 HC ANESTHESIA TECHNICAL FEE, PER MIN: Performed by: STUDENT IN AN ORGANIZED HEALTH CARE EDUCATION/TRAINING PROGRAM

## 2025-03-28 PROCEDURE — 258N000003 HC RX IP 258 OP 636: Performed by: ANESTHESIOLOGY

## 2025-03-28 PROCEDURE — 86900 BLOOD TYPING SEROLOGIC ABO: CPT | Performed by: ANESTHESIOLOGY

## 2025-03-28 PROCEDURE — 272N000001 HC OR GENERAL SUPPLY STERILE: Performed by: STUDENT IN AN ORGANIZED HEALTH CARE EDUCATION/TRAINING PROGRAM

## 2025-03-28 PROCEDURE — 0TT04ZZ RESECTION OF RIGHT KIDNEY, PERCUTANEOUS ENDOSCOPIC APPROACH: ICD-10-PCS | Performed by: STUDENT IN AN ORGANIZED HEALTH CARE EDUCATION/TRAINING PROGRAM

## 2025-03-28 PROCEDURE — 258N000001 HC RX 258: Performed by: STUDENT IN AN ORGANIZED HEALTH CARE EDUCATION/TRAINING PROGRAM

## 2025-03-28 RX ORDER — HYDRALAZINE HYDROCHLORIDE 20 MG/ML
10 INJECTION INTRAMUSCULAR; INTRAVENOUS
Status: COMPLETED | OUTPATIENT
Start: 2025-03-28 | End: 2025-03-28

## 2025-03-28 RX ORDER — NALOXONE HYDROCHLORIDE 0.4 MG/ML
0.4 INJECTION, SOLUTION INTRAMUSCULAR; INTRAVENOUS; SUBCUTANEOUS
Status: DISCONTINUED | OUTPATIENT
Start: 2025-03-28 | End: 2025-03-29 | Stop reason: HOSPADM

## 2025-03-28 RX ORDER — BUPIVACAINE HYDROCHLORIDE 2.5 MG/ML
INJECTION, SOLUTION INFILTRATION; PERINEURAL PRN
Status: DISCONTINUED | OUTPATIENT
Start: 2025-03-28 | End: 2025-03-28 | Stop reason: HOSPADM

## 2025-03-28 RX ORDER — NALOXONE HYDROCHLORIDE 0.4 MG/ML
0.2 INJECTION, SOLUTION INTRAMUSCULAR; INTRAVENOUS; SUBCUTANEOUS
Status: DISCONTINUED | OUTPATIENT
Start: 2025-03-28 | End: 2025-03-29 | Stop reason: HOSPADM

## 2025-03-28 RX ORDER — ONDANSETRON 2 MG/ML
4 INJECTION INTRAMUSCULAR; INTRAVENOUS EVERY 6 HOURS PRN
Status: DISCONTINUED | OUTPATIENT
Start: 2025-03-28 | End: 2025-03-29 | Stop reason: HOSPADM

## 2025-03-28 RX ORDER — METHOCARBAMOL 500 MG/1
250 TABLET ORAL EVERY 6 HOURS PRN
Status: DISCONTINUED | OUTPATIENT
Start: 2025-03-28 | End: 2025-03-29

## 2025-03-28 RX ORDER — ACETAMINOPHEN 325 MG/1
975 TABLET ORAL ONCE
Status: COMPLETED | OUTPATIENT
Start: 2025-03-28 | End: 2025-03-28

## 2025-03-28 RX ORDER — CEFAZOLIN SODIUM/WATER 2 G/20 ML
2 SYRINGE (ML) INTRAVENOUS SEE ADMIN INSTRUCTIONS
Status: DISCONTINUED | OUTPATIENT
Start: 2025-03-28 | End: 2025-03-28 | Stop reason: HOSPADM

## 2025-03-28 RX ORDER — SODIUM CHLORIDE, SODIUM LACTATE, POTASSIUM CHLORIDE, CALCIUM CHLORIDE 600; 310; 30; 20 MG/100ML; MG/100ML; MG/100ML; MG/100ML
INJECTION, SOLUTION INTRAVENOUS CONTINUOUS
Status: DISCONTINUED | OUTPATIENT
Start: 2025-03-28 | End: 2025-03-28 | Stop reason: HOSPADM

## 2025-03-28 RX ORDER — HYDROMORPHONE HCL IN WATER/PF 6 MG/30 ML
0.4 PATIENT CONTROLLED ANALGESIA SYRINGE INTRAVENOUS
Status: DISCONTINUED | OUTPATIENT
Start: 2025-03-28 | End: 2025-03-29

## 2025-03-28 RX ORDER — ASPIRIN 81 MG/1
81 TABLET ORAL DAILY
Status: DISCONTINUED | OUTPATIENT
Start: 2025-03-29 | End: 2025-03-29 | Stop reason: HOSPADM

## 2025-03-28 RX ORDER — FENTANYL CITRATE 0.05 MG/ML
25 INJECTION, SOLUTION INTRAMUSCULAR; INTRAVENOUS EVERY 5 MIN PRN
Status: DISCONTINUED | OUTPATIENT
Start: 2025-03-28 | End: 2025-03-28 | Stop reason: HOSPADM

## 2025-03-28 RX ORDER — GINSENG 100 MG
CAPSULE ORAL 3 TIMES DAILY
Status: DISCONTINUED | OUTPATIENT
Start: 2025-03-28 | End: 2025-03-29 | Stop reason: HOSPADM

## 2025-03-28 RX ORDER — PROCHLORPERAZINE MALEATE 5 MG/1
5 TABLET ORAL EVERY 6 HOURS PRN
Status: DISCONTINUED | OUTPATIENT
Start: 2025-03-28 | End: 2025-03-29 | Stop reason: HOSPADM

## 2025-03-28 RX ORDER — ONDANSETRON 4 MG/1
4 TABLET, ORALLY DISINTEGRATING ORAL EVERY 6 HOURS PRN
Status: DISCONTINUED | OUTPATIENT
Start: 2025-03-28 | End: 2025-03-29 | Stop reason: HOSPADM

## 2025-03-28 RX ORDER — TAMSULOSIN HYDROCHLORIDE 0.4 MG/1
0.4 CAPSULE ORAL EVERY EVENING
Status: DISCONTINUED | OUTPATIENT
Start: 2025-03-28 | End: 2025-03-29 | Stop reason: HOSPADM

## 2025-03-28 RX ORDER — OXYCODONE HYDROCHLORIDE 5 MG/1
10 TABLET ORAL EVERY 4 HOURS PRN
Status: DISCONTINUED | OUTPATIENT
Start: 2025-03-28 | End: 2025-03-29

## 2025-03-28 RX ORDER — ROSUVASTATIN CALCIUM 10 MG/1
10 TABLET, COATED ORAL DAILY
Status: DISCONTINUED | OUTPATIENT
Start: 2025-03-29 | End: 2025-03-29 | Stop reason: HOSPADM

## 2025-03-28 RX ORDER — BISACODYL 10 MG
10 SUPPOSITORY, RECTAL RECTAL DAILY PRN
Status: DISCONTINUED | OUTPATIENT
Start: 2025-03-31 | End: 2025-03-29 | Stop reason: HOSPADM

## 2025-03-28 RX ORDER — ONDANSETRON 2 MG/ML
4 INJECTION INTRAMUSCULAR; INTRAVENOUS EVERY 30 MIN PRN
Status: DISCONTINUED | OUTPATIENT
Start: 2025-03-28 | End: 2025-03-28 | Stop reason: HOSPADM

## 2025-03-28 RX ORDER — HYDROXYZINE HYDROCHLORIDE 10 MG/1
10 TABLET, FILM COATED ORAL EVERY 6 HOURS PRN
Status: DISCONTINUED | OUTPATIENT
Start: 2025-03-28 | End: 2025-03-29 | Stop reason: HOSPADM

## 2025-03-28 RX ORDER — DEXAMETHASONE SODIUM PHOSPHATE 4 MG/ML
4 INJECTION, SOLUTION INTRA-ARTICULAR; INTRALESIONAL; INTRAMUSCULAR; INTRAVENOUS; SOFT TISSUE
Status: DISCONTINUED | OUTPATIENT
Start: 2025-03-28 | End: 2025-03-28 | Stop reason: HOSPADM

## 2025-03-28 RX ORDER — ACETAMINOPHEN 325 MG/1
975 TABLET ORAL EVERY 8 HOURS
Status: DISCONTINUED | OUTPATIENT
Start: 2025-03-28 | End: 2025-03-29 | Stop reason: HOSPADM

## 2025-03-28 RX ORDER — FAMOTIDINE 20 MG/1
20 TABLET, FILM COATED ORAL 2 TIMES DAILY PRN
Status: DISCONTINUED | OUTPATIENT
Start: 2025-03-28 | End: 2025-03-29 | Stop reason: HOSPADM

## 2025-03-28 RX ORDER — ATENOLOL 25 MG/1
25 TABLET ORAL DAILY
Status: DISCONTINUED | OUTPATIENT
Start: 2025-03-29 | End: 2025-03-29 | Stop reason: HOSPADM

## 2025-03-28 RX ORDER — HYDROMORPHONE HCL IN WATER/PF 6 MG/30 ML
0.2 PATIENT CONTROLLED ANALGESIA SYRINGE INTRAVENOUS
Status: DISCONTINUED | OUTPATIENT
Start: 2025-03-28 | End: 2025-03-29

## 2025-03-28 RX ORDER — ACETAMINOPHEN 650 MG/1
650 SUPPOSITORY RECTAL ONCE
Status: COMPLETED | OUTPATIENT
Start: 2025-03-28 | End: 2025-03-28

## 2025-03-28 RX ORDER — FENTANYL CITRATE 0.05 MG/ML
50 INJECTION, SOLUTION INTRAMUSCULAR; INTRAVENOUS EVERY 5 MIN PRN
Status: DISCONTINUED | OUTPATIENT
Start: 2025-03-28 | End: 2025-03-28 | Stop reason: HOSPADM

## 2025-03-28 RX ORDER — HYDROMORPHONE HCL IN WATER/PF 6 MG/30 ML
0.4 PATIENT CONTROLLED ANALGESIA SYRINGE INTRAVENOUS EVERY 5 MIN PRN
Status: DISCONTINUED | OUTPATIENT
Start: 2025-03-28 | End: 2025-03-28 | Stop reason: HOSPADM

## 2025-03-28 RX ORDER — HYDROMORPHONE HCL IN WATER/PF 6 MG/30 ML
0.2 PATIENT CONTROLLED ANALGESIA SYRINGE INTRAVENOUS EVERY 5 MIN PRN
Status: DISCONTINUED | OUTPATIENT
Start: 2025-03-28 | End: 2025-03-28 | Stop reason: HOSPADM

## 2025-03-28 RX ORDER — NALOXONE HYDROCHLORIDE 0.4 MG/ML
0.1 INJECTION, SOLUTION INTRAMUSCULAR; INTRAVENOUS; SUBCUTANEOUS
Status: DISCONTINUED | OUTPATIENT
Start: 2025-03-28 | End: 2025-03-28 | Stop reason: HOSPADM

## 2025-03-28 RX ORDER — MAGNESIUM HYDROXIDE 1200 MG/15ML
LIQUID ORAL PRN
Status: DISCONTINUED | OUTPATIENT
Start: 2025-03-28 | End: 2025-03-28 | Stop reason: HOSPADM

## 2025-03-28 RX ORDER — OXYCODONE HYDROCHLORIDE 5 MG/1
5 TABLET ORAL EVERY 4 HOURS PRN
Status: DISCONTINUED | OUTPATIENT
Start: 2025-03-28 | End: 2025-03-29

## 2025-03-28 RX ORDER — HYDROMORPHONE HYDROCHLORIDE 1 MG/ML
0.5 INJECTION, SOLUTION INTRAMUSCULAR; INTRAVENOUS; SUBCUTANEOUS ONCE
Status: COMPLETED | OUTPATIENT
Start: 2025-03-28 | End: 2025-03-28

## 2025-03-28 RX ORDER — AMOXICILLIN 250 MG
1 CAPSULE ORAL 2 TIMES DAILY
Status: DISCONTINUED | OUTPATIENT
Start: 2025-03-28 | End: 2025-03-29 | Stop reason: HOSPADM

## 2025-03-28 RX ORDER — HEPARIN SODIUM 5000 [USP'U]/.5ML
5000 INJECTION, SOLUTION INTRAVENOUS; SUBCUTANEOUS
Status: COMPLETED | OUTPATIENT
Start: 2025-03-28 | End: 2025-03-28

## 2025-03-28 RX ORDER — ONDANSETRON 4 MG/1
4 TABLET, ORALLY DISINTEGRATING ORAL EVERY 30 MIN PRN
Status: DISCONTINUED | OUTPATIENT
Start: 2025-03-28 | End: 2025-03-28 | Stop reason: HOSPADM

## 2025-03-28 RX ORDER — LIDOCAINE 40 MG/G
CREAM TOPICAL
Status: DISCONTINUED | OUTPATIENT
Start: 2025-03-28 | End: 2025-03-29 | Stop reason: HOSPADM

## 2025-03-28 RX ORDER — POLYETHYLENE GLYCOL 3350 17 G/17G
17 POWDER, FOR SOLUTION ORAL DAILY
Status: DISCONTINUED | OUTPATIENT
Start: 2025-03-29 | End: 2025-03-29 | Stop reason: HOSPADM

## 2025-03-28 RX ORDER — CEFAZOLIN SODIUM/WATER 2 G/20 ML
2 SYRINGE (ML) INTRAVENOUS
Status: COMPLETED | OUTPATIENT
Start: 2025-03-28 | End: 2025-03-28

## 2025-03-28 RX ADMIN — BACITRACIN: 500 OINTMENT TOPICAL at 21:21

## 2025-03-28 RX ADMIN — HYDROMORPHONE HYDROCHLORIDE 0.5 MG: 1 INJECTION, SOLUTION INTRAMUSCULAR; INTRAVENOUS; SUBCUTANEOUS at 11:49

## 2025-03-28 RX ADMIN — SODIUM CHLORIDE, SODIUM LACTATE, POTASSIUM CHLORIDE, AND CALCIUM CHLORIDE: .6; .31; .03; .02 INJECTION, SOLUTION INTRAVENOUS at 06:37

## 2025-03-28 RX ADMIN — ACETAMINOPHEN 975 MG: 325 TABLET ORAL at 06:04

## 2025-03-28 RX ADMIN — HYDROMORPHONE HYDROCHLORIDE 0.4 MG: 0.2 INJECTION, SOLUTION INTRAMUSCULAR; INTRAVENOUS; SUBCUTANEOUS at 13:51

## 2025-03-28 RX ADMIN — FENTANYL CITRATE 50 MCG: 50 INJECTION, SOLUTION INTRAMUSCULAR; INTRAVENOUS at 11:36

## 2025-03-28 RX ADMIN — HYDRALAZINE HYDROCHLORIDE 10 MG: 20 INJECTION INTRAMUSCULAR; INTRAVENOUS at 12:04

## 2025-03-28 RX ADMIN — HYDROMORPHONE HYDROCHLORIDE 0.2 MG: 0.2 INJECTION, SOLUTION INTRAMUSCULAR; INTRAVENOUS; SUBCUTANEOUS at 12:32

## 2025-03-28 RX ADMIN — OXYCODONE HYDROCHLORIDE 5 MG: 5 TABLET ORAL at 16:24

## 2025-03-28 RX ADMIN — ACETAMINOPHEN 975 MG: 325 TABLET ORAL at 16:19

## 2025-03-28 RX ADMIN — FENTANYL CITRATE 50 MCG: 50 INJECTION, SOLUTION INTRAMUSCULAR; INTRAVENOUS at 11:44

## 2025-03-28 RX ADMIN — OXYCODONE HYDROCHLORIDE 5 MG: 5 TABLET ORAL at 20:57

## 2025-03-28 RX ADMIN — BACITRACIN: 500 OINTMENT TOPICAL at 16:19

## 2025-03-28 RX ADMIN — HEPARIN SODIUM 5000 UNITS: 5000 INJECTION, SOLUTION INTRAVENOUS; SUBCUTANEOUS at 06:07

## 2025-03-28 RX ADMIN — TAMSULOSIN HYDROCHLORIDE 0.4 MG: 0.4 CAPSULE ORAL at 20:57

## 2025-03-28 ASSESSMENT — ACTIVITIES OF DAILY LIVING (ADL)
ADLS_ACUITY_SCORE: 15
ADLS_ACUITY_SCORE: 41
ADLS_ACUITY_SCORE: 15
ADLS_ACUITY_SCORE: 15
ADLS_ACUITY_SCORE: 22
ADLS_ACUITY_SCORE: 15
ADLS_ACUITY_SCORE: 22
ADLS_ACUITY_SCORE: 15
ADLS_ACUITY_SCORE: 22
ADLS_ACUITY_SCORE: 22
ADLS_ACUITY_SCORE: 15
ADLS_ACUITY_SCORE: 22
ADLS_ACUITY_SCORE: 15
ADLS_ACUITY_SCORE: 22
ADLS_ACUITY_SCORE: 15

## 2025-03-28 NOTE — OR NURSING
Dr. Greenwood at the bedside for high BP.  Order given for 0.5 dilaudid. If pressure continues to be high order given for 10mg hydralazine.

## 2025-03-28 NOTE — PLAN OF CARE
Date & Time: 3/28/25 9991-3102  Surgery/POD#: POD 0 of R Nephrectomy  Behavior & Aggression: Green  Fall Risk: Yes  Orientation:A&Ox4  ABNL VS/O2:VSS on Ra, patient refused capno  ABNL Labs: N/A  Pain Management:Pain controlled with PO Oxy and tylenol  Bowel/Bladder: BS active -gas/-bm. Gasca cath in place, plan to remove tomorrow  Drains: PIV SL  Wounds/incisions: X3 R Port sites and X1 R transverse site. BRITTANY w/LB. Old Abdominal midline scar from recent triple AAA repair  Diet:Tolerating regular diet, denies N/V  Activity Level: Up out of bed X1 during shift, up A+1 gb/w  Tests/Procedures: N/A  Anticipated  DC Date: tomorrow pending  Significant Information: CMS intact.  LS clear, IS encouraged. Encouraged fluids.

## 2025-03-28 NOTE — OP NOTE
PREOPERATIVE DIAGNOSIS:  Right renal mass.   POSTOPERATIVE DIAGNOSIS: Same  PROCEDURE PERFORMED:    Right robot nephrectomy, retroperitoneal   STAFF SURGEON: Morgan Joel MD.  Resident: MD Beba Guzman MD  ANESTHESIA: General.   ESTIMATED BLOOD LOSS: 50 mL.   DRAINS AND TUBES:   Gasca catheter.   COMPLICATIONS: None.   DISPOSITION: PACU.   SPECIMENS OBTAINED:    Right kidney, proximal right ureter  SIGNIFICANT FINDINGS:   Grossly negative margins  Right adrenal gland spared     HISTORY OF PRESENT ILLNESS:  Asael Crump  is a 80 year old adult with PMH of AAA repair and right renal mass. After a discussion of the risks and benefits of surgery he opted to proceed with the above described procedure.  OPERATION PERFORMED:   Informed consent was obtained and the patient was brought to the operating room where general anesthesia was induced. He was given appropriate preoperative antibiotics, He was then positioned in the right full flank position where all pressure points were carefully padded and secured. He was then prepped and draped in the usual sterile fashion. We then performed a timeout, verifying the correct patient's site and procedure to be performed.    An incision was made at the mid axillary line. We dissected down through muscular fascial planes to the retroperitoneal fat which was palpable. The space here was first finger dissected to identify the retroperitoneal space .We then placed the dilating balloon port into the retroperitoneal space. This was filled under direct visualization with 60 pumps of the balloon. We then placed a robotic port through this incision and three other dilating ports were placed and the robot was docked after dissecting this space using a laparoscopic Kitner's to reflect the peritoneum . This needed to be mobilized laparoscopically to ensure appropriate trans retroperitoneal placement of trocars.   We started by mobilizing the posterior kidney along the  psoas muscle. The renal artery and vein were identified and sequentially ligated with a 35 mm ERIBERTO stapler. We then mobilized the anterior kidney, lower pole taking the ureter and gonadal vessels, and the upper pole attachments. The adrenal gland was identified and spared. Once the kidney was freed it was  placed into an endocatch bag . Surgicel was placed in the wound bed.   The specimen was extracted through a lott incision by extending the 12 mm airseal port. The abdomen was irrigated with normal saline. 0-PDS was used to close the fascia in a single layer, 3-0 vicryl for maría's fascia. 0.25% Marcaine was  used for topical anesthesia. The skin incision were closed with 4-0 monocryl and dermabond. Patient tolerated the procedure well and was transferred to the PACU uneventfully. There were no complications    Morgan Joel MD  [facility]

## 2025-03-28 NOTE — BRIEF OP NOTE
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  Complications: None.  Implants: * No implants in log *        - Admit overnight  - Gasca out tomorrow morning  - Discharge once pain controlled, ambulating, tolerating diet    Abdi Woody MD  Urology PGY-5

## 2025-03-29 VITALS
WEIGHT: 177 LBS | HEART RATE: 60 BPM | DIASTOLIC BLOOD PRESSURE: 51 MMHG | OXYGEN SATURATION: 96 % | RESPIRATION RATE: 18 BRPM | BODY MASS INDEX: 22.72 KG/M2 | HEIGHT: 74 IN | SYSTOLIC BLOOD PRESSURE: 112 MMHG | TEMPERATURE: 97.7 F

## 2025-03-29 LAB
ANION GAP SERPL CALCULATED.3IONS-SCNC: 10 MMOL/L (ref 7–15)
BUN SERPL-MCNC: 20.5 MG/DL (ref 8–23)
CALCIUM SERPL-MCNC: 8.6 MG/DL (ref 8.8–10.4)
CHLORIDE SERPL-SCNC: 101 MMOL/L (ref 98–107)
CREAT SERPL-MCNC: 1.43 MG/DL (ref 0.67–1.17)
EGFRCR SERPLBLD CKD-EPI 2021: 50 ML/MIN/1.73M2
ERYTHROCYTE [DISTWIDTH] IN BLOOD BY AUTOMATED COUNT: 14.6 % (ref 10–15)
GLUCOSE SERPL-MCNC: 131 MG/DL (ref 70–99)
HCO3 SERPL-SCNC: 23 MMOL/L (ref 22–29)
HCT VFR BLD AUTO: 35.7 % (ref 40–53)
HGB BLD-MCNC: 11.8 G/DL (ref 13.3–17.7)
MCH RBC QN AUTO: 28.6 PG (ref 26.5–33)
MCHC RBC AUTO-ENTMCNC: 33.1 G/DL (ref 31.5–36.5)
MCV RBC AUTO: 86 FL (ref 78–100)
PLATELET # BLD AUTO: 188 10E3/UL (ref 150–450)
POTASSIUM SERPL-SCNC: 4.2 MMOL/L (ref 3.4–5.3)
RBC # BLD AUTO: 4.13 10E6/UL (ref 4.4–5.9)
SODIUM SERPL-SCNC: 134 MMOL/L (ref 135–145)
WBC # BLD AUTO: 9.6 10E3/UL (ref 4–11)

## 2025-03-29 PROCEDURE — 80048 BASIC METABOLIC PNL TOTAL CA: CPT | Performed by: STUDENT IN AN ORGANIZED HEALTH CARE EDUCATION/TRAINING PROGRAM

## 2025-03-29 PROCEDURE — 250N000011 HC RX IP 250 OP 636: Performed by: STUDENT IN AN ORGANIZED HEALTH CARE EDUCATION/TRAINING PROGRAM

## 2025-03-29 PROCEDURE — 85018 HEMOGLOBIN: CPT | Performed by: STUDENT IN AN ORGANIZED HEALTH CARE EDUCATION/TRAINING PROGRAM

## 2025-03-29 PROCEDURE — 250N000013 HC RX MED GY IP 250 OP 250 PS 637: Performed by: STUDENT IN AN ORGANIZED HEALTH CARE EDUCATION/TRAINING PROGRAM

## 2025-03-29 PROCEDURE — 36415 COLL VENOUS BLD VENIPUNCTURE: CPT | Performed by: STUDENT IN AN ORGANIZED HEALTH CARE EDUCATION/TRAINING PROGRAM

## 2025-03-29 RX ORDER — HYDROMORPHONE HCL IN WATER/PF 6 MG/30 ML
0.2 PATIENT CONTROLLED ANALGESIA SYRINGE INTRAVENOUS
Status: DISCONTINUED | OUTPATIENT
Start: 2025-03-29 | End: 2025-03-29 | Stop reason: HOSPADM

## 2025-03-29 RX ORDER — HYDROMORPHONE HCL IN WATER/PF 6 MG/30 ML
0.2 PATIENT CONTROLLED ANALGESIA SYRINGE INTRAVENOUS
Status: DISCONTINUED | OUTPATIENT
Start: 2025-03-29 | End: 2025-03-29

## 2025-03-29 RX ORDER — OXYCODONE HYDROCHLORIDE 5 MG/1
15 TABLET ORAL EVERY 6 HOURS PRN
Status: DISCONTINUED | OUTPATIENT
Start: 2025-03-29 | End: 2025-03-29

## 2025-03-29 RX ORDER — OXYCODONE HYDROCHLORIDE 5 MG/1
5-10 TABLET ORAL EVERY 4 HOURS PRN
Qty: 6 TABLET | Refills: 0 | Status: SHIPPED | OUTPATIENT
Start: 2025-03-29

## 2025-03-29 RX ORDER — HEPARIN SODIUM 5000 [USP'U]/.5ML
5000 INJECTION, SOLUTION INTRAVENOUS; SUBCUTANEOUS EVERY 8 HOURS
Status: DISCONTINUED | OUTPATIENT
Start: 2025-03-29 | End: 2025-03-29 | Stop reason: HOSPADM

## 2025-03-29 RX ORDER — OXYCODONE HYDROCHLORIDE 5 MG/1
5 TABLET ORAL EVERY 4 HOURS PRN
Status: DISCONTINUED | OUTPATIENT
Start: 2025-03-29 | End: 2025-03-29 | Stop reason: HOSPADM

## 2025-03-29 RX ORDER — ONDANSETRON 4 MG/1
4 TABLET, ORALLY DISINTEGRATING ORAL EVERY 8 HOURS PRN
Qty: 4 TABLET | Refills: 0 | Status: SHIPPED | OUTPATIENT
Start: 2025-03-29

## 2025-03-29 RX ORDER — HYDROMORPHONE HCL IN WATER/PF 6 MG/30 ML
0.4 PATIENT CONTROLLED ANALGESIA SYRINGE INTRAVENOUS
Status: DISCONTINUED | OUTPATIENT
Start: 2025-03-29 | End: 2025-03-29 | Stop reason: HOSPADM

## 2025-03-29 RX ORDER — AMOXICILLIN 250 MG
1-2 CAPSULE ORAL 2 TIMES DAILY
Qty: 30 TABLET | Refills: 0 | Status: SHIPPED | OUTPATIENT
Start: 2025-03-29

## 2025-03-29 RX ORDER — OXYCODONE HYDROCHLORIDE 5 MG/1
10 TABLET ORAL EVERY 6 HOURS PRN
Status: DISCONTINUED | OUTPATIENT
Start: 2025-03-29 | End: 2025-03-29 | Stop reason: HOSPADM

## 2025-03-29 RX ORDER — METHOCARBAMOL 500 MG/1
500 TABLET, FILM COATED ORAL EVERY 6 HOURS PRN
Status: DISCONTINUED | OUTPATIENT
Start: 2025-03-29 | End: 2025-03-29 | Stop reason: HOSPADM

## 2025-03-29 RX ORDER — HYDROMORPHONE HYDROCHLORIDE 1 MG/ML
0.5 INJECTION, SOLUTION INTRAMUSCULAR; INTRAVENOUS; SUBCUTANEOUS
Status: DISCONTINUED | OUTPATIENT
Start: 2025-03-29 | End: 2025-03-29

## 2025-03-29 RX ORDER — ACETAMINOPHEN 325 MG/1
650 TABLET ORAL EVERY 4 HOURS PRN
Qty: 50 TABLET | Refills: 0 | Status: SHIPPED | OUTPATIENT
Start: 2025-03-29

## 2025-03-29 RX ORDER — OXYCODONE HYDROCHLORIDE 5 MG/1
5 TABLET ORAL EVERY 4 HOURS PRN
Status: DISCONTINUED | OUTPATIENT
Start: 2025-03-29 | End: 2025-03-29

## 2025-03-29 RX ADMIN — SENNOSIDES AND DOCUSATE SODIUM 1 TABLET: 50; 8.6 TABLET ORAL at 08:50

## 2025-03-29 RX ADMIN — ROSUVASTATIN CALCIUM 10 MG: 10 TABLET, FILM COATED ORAL at 08:49

## 2025-03-29 RX ADMIN — ATENOLOL 25 MG: 25 TABLET ORAL at 08:49

## 2025-03-29 RX ADMIN — ACETAMINOPHEN 975 MG: 325 TABLET ORAL at 01:32

## 2025-03-29 RX ADMIN — ASPIRIN 81 MG: 81 TABLET, COATED ORAL at 08:50

## 2025-03-29 RX ADMIN — BACITRACIN: 500 OINTMENT TOPICAL at 08:51

## 2025-03-29 RX ADMIN — HEPARIN SODIUM 5000 UNITS: 5000 INJECTION, SOLUTION INTRAVENOUS; SUBCUTANEOUS at 08:50

## 2025-03-29 RX ADMIN — POLYETHYLENE GLYCOL 3350 17 G: 17 POWDER, FOR SOLUTION ORAL at 08:50

## 2025-03-29 RX ADMIN — ACETAMINOPHEN 975 MG: 325 TABLET ORAL at 07:25

## 2025-03-29 ASSESSMENT — ACTIVITIES OF DAILY LIVING (ADL)
ADLS_ACUITY_SCORE: 22

## 2025-03-29 NOTE — PLAN OF CARE
Date & Time: 2/29/2025 5419-2505  Surgery/POD#: POD 1: R nephrectomy  Behavior & Aggression: Green  Fall Risk: Yes  Orientation:A&O x4  ABNL VS/O2:VSS RA  ABNL Labs: n/a  Pain Management:Scheduled Tylenol. PRN oxy available  Bowel/Bladder: AUO with Gasca. +BS, -Gas, -BM  Drains: Gasca, PIV SL  Wounds/incisions 3 port sites, 1 transverse R flank incision WDL. Old AAA surgical repair scar  Diet:Reg  Activity Level: A1GBW  Tests/Procedures: None  Anticipated  DC Date: Pending today  Significant Information: Pt refused senna. Prefers miralax but does not have an order. Sticky note to providers

## 2025-03-29 NOTE — DISCHARGE SUMMARY
Urology Discharge Summary    Patient:   Asael Crump  MRN:   [id number]  :   1945    Date of Admission: 3/28/2025  Date of Discharge: 3/29/2025  Admitting Physician: Morgan Joel MD  Discharge Physician: Morgan Joel MD MD          Admission Diagnoses:   Right renal Mass          Discharge Diagnosis:     Right renal MAss          Procedures:   Right Robot Assisted Retroperitoneal Nephrectomy         Consultations:   No consultations were requested during this admission         Brief History of Illness:   Right renal mass incidentally detected during workup for AAA  AAA repair done 3 months ago and he now presented for surgical treatment of the renal mass           Hospital Course:   The patient was admitted to the hospital and underwent the above named procedures.  For specific details, please refer to the dictated operative report in the patient's chart.  In summary, he tolerated the procedure well and there were no intraoperative complications.  Following the procedure he was admitted to the floor for routine post operative care.      The patient's overall hospital course was uneventful, and he remained hemodynamically stable and afebrile for the hospital stay.  Diet was advanced to a regular diet, which was tolerated well before discharge.  He also had return of antegrade bowel function prior to discharging from the hospital.  Labs also remained stable.  At this time the patient was determined stable for discharge from the hospital.           Discharge Medications:     Current Discharge Medication List        START taking these medications    Details   !! acetaminophen (TYLENOL) 325 MG tablet Take 2 tablets (650 mg) by mouth every 4 hours as needed for mild pain.  Qty: 50 tablet, Refills: 0    Associated Diagnoses: Right renal mass      ondansetron (ZOFRAN ODT) 4 MG ODT tab Take 1 tablet (4 mg) by mouth every 8 hours as needed for nausea.  Qty: 4 tablet, Refills: 0    Associated Diagnoses:  Right renal mass      oxyCODONE (ROXICODONE) 5 MG tablet Take 1-2 tablets (5-10 mg) by mouth every 4 hours as needed for moderate to severe pain.  Qty: 6 tablet, Refills: 0    Associated Diagnoses: Right renal mass      senna-docusate (SENOKOT-S/PERICOLACE) 8.6-50 MG tablet Take 1-2 tablets by mouth 2 times daily.  Qty: 30 tablet, Refills: 0    Associated Diagnoses: Right renal mass       !! - Potential duplicate medications found. Please discuss with provider.        CONTINUE these medications which have NOT CHANGED    Details   !! acetaminophen (TYLENOL) 500 MG tablet Take 1,000 mg by mouth every 6 hours as needed ([id number]).      aspirin 81 MG EC tablet [ASPIRIN 81 MG EC TABLET] Take 81 mg by mouth daily.      atenolol (TENORMIN) 25 MG tablet TAKE 1 TABLET BY MOUTH EVERY DAY  Qty: 90 tablet, Refills: 2    Associated Diagnoses: Atherosclerosis of native coronary artery of native heart without angina pectoris      cholecalciferol, vitamin D3, (VITAMIN D3) 2,000 unit cap [CHOLECALCIFEROL, VITAMIN D3, (VITAMIN D3) 2,000 UNIT CAP] Take by mouth daily.      cyanocobalamin (VITAMIN B-12) 1000 MCG tablet Take 1,000 mcg by mouth daily      polyethylene glycol (MIRALAX) 17 g packet Take 1 packet by mouth daily.      rosuvastatin (CRESTOR) 10 MG tablet Take 1 tablet (10 mg) by mouth daily.  Qty: 90 tablet, Refills: 4    Associated Diagnoses: Atherosclerosis of native coronary artery of native heart without angina pectoris      tamsulosin (FLOMAX) 0.4 MG capsule Take 1 capsule (0.4 mg) by mouth every evening.  Qty: 90 capsule, Refills: 4    Associated Diagnoses: BPH with urinary obstruction       !! - Potential duplicate medications found. Please discuss with provider.                Discharge Instructions:   Activity  - No strenuous exercise for 6 weeks.  - No lifting, pushing, pulling more than 15 pounds for 6 weeks.   - Do not strain with bowel movements.  - Do not drive until you can press the brake pedal quickly  and fully without pain.   - Do not operate a motor vehicle while taking narcotic pain medications.                  Discharge Follow-Up:   Follow-Up:  - Follow-up as scheduled with Dr Joel on 4/15/2025  - Call your primary care provider to touch base regarding your recent admission.   - Call or return sooner than your regularly scheduled visit if you develop any of the following: fever (greater than 101.5), uncontrolled pain, uncontrolled nausea or vomiting, as well as increased redness, swelling, or drainage from your wound.          Discharge Disposition:     Discharged to home         Morgan Joel MD  [facility]

## 2025-03-29 NOTE — PROGRESS NOTES
Date & Time: 3/28/25 7565-9808  Surgery/POD#: POD 0 of R Nephrectomy  Behavior & Aggression: Green  Fall Risk: Yes  Orientation:A&Ox4  ABNL VS/O2:VSS on Ra, patient refused capno  ABNL Labs: N/A  Pain Management: Pain well controlled with PO Oxy and tylenol  Bowel/Bladder: BS active -gas/-bm. Gasca cath in place, plan to remove tomorrow  Drains: PIV SL  Wounds/incisions: X3 R Port sites and X1 R transverse site. BRITTANY, liquid bandage. Old Abdominal midline scar from recent triple AAA repair  Diet: regular diet, denies N/V  Activity Level: A1GB - not oob tonight   Tests/Procedures: N/A  Anticipated  DC Date: tomorrow pending  Significant Information: CMS intact. IS encouraged. Encouraged fluids.

## 2025-03-29 NOTE — PROGRESS NOTES
Urology Daily Progress Note    24 hour events/Subjective:     - No acute events overnight   - Pain well controlled on current regimen   - Tolerating regular diet ; no nausea or vomiting   - Not yet passing flatus.   - Ambulated x 1 yesterday      O:  Vitals: Afebrile, VSS  General: Alert, interactive, in NAD  Resp: Non-labored breathing on RA  Abdomen: Soft, appropriately-tender, non distended. Incision(s) c/d/i w/ dermabond; no erythema or drainage.   Ext: Warm and well perfused, No LE edema   Del Real/Urostomy: Clear yellow       I/O last 3 completed shifts:  In: 1000 [I.V.:1000]  Out: 1225 [Urine:1175; Blood:50] - Last 24 hours    No intake/output data recorded. - Since Midnight      Labs/Imaging  Heme:  Recent Labs   Lab 03/29/25  0813   WBC 9.6   HGB 11.8*        Chem:No lab results found in last 7 days. Chem awaitied    Assessment/Plan  80 year old y/o male POD#1  s/p Right retro Robotic Nephrectomy. Doing well    NEURO Pain well controlled on current regimen.     CV HDS.    PULM Aggressive pulmonary toilet    FEN/GI Regular diet as tolerated     Remove del real   HEME Hgb as above.     ID Afebrile, no leukocytosis.    Antibiotics: completed   ENDO No issues   ACTIVITY Up as tolerated  Ambulate at least TID    PPx SCDs, ambulation   DISPO Home,  today        Plan:  Remove Del Real  Discharge Today  Follow-up as planned for follow-up   --    Morgan Joel MD,   [facility]

## 2025-03-29 NOTE — PLAN OF CARE
Date & Time: 2/29/2025 1494-8031  Surgery/POD#: POD 1: R nephrectomy  Behavior & Aggression: Green  Fall Risk: Yes  Orientation:A&Ox4  ABNL VS/O2:VSS RA  ABNL Labs: n/a  Pain Management:Scheduled Tylenol. PRN oxy available  Bowel/Bladder: Gasca removed, voided X3. BS active +gas/-bm  Drains: PIV removed  Wounds/incisions 3 port sites, 1 transverse R flank incision WDL. Old AAA surgical repair scar  Diet:Reg, tolerating  Activity Level: A+1 GBW  Tests/Procedures: None  Anticipated  DC Date: Pending today  Significant Information: CMS intact. Discharge instructions and medications reviewed with patient and family. Patient discharged home at approximately 1500.

## 2025-03-31 LAB
PATH REPORT.COMMENTS IMP SPEC: ABNORMAL
PATH REPORT.COMMENTS IMP SPEC: ABNORMAL
PATH REPORT.COMMENTS IMP SPEC: YES
PATH REPORT.FINAL DX SPEC: ABNORMAL
PATH REPORT.GROSS SPEC: ABNORMAL
PATH REPORT.MICROSCOPIC SPEC OTHER STN: ABNORMAL
PATH REPORT.RELEVANT HX SPEC: ABNORMAL
PATHOLOGY SYNOPTIC REPORT: ABNORMAL
PHOTO IMAGE: ABNORMAL

## 2025-03-31 PROCEDURE — 88307 TISSUE EXAM BY PATHOLOGIST: CPT | Mod: 26 | Performed by: PATHOLOGY

## 2025-04-02 ENCOUNTER — TELEPHONE (OUTPATIENT)
Dept: UROLOGY | Facility: CLINIC | Age: 80
End: 2025-04-02
Payer: MEDICARE

## 2025-04-02 NOTE — TELEPHONE ENCOUNTER
"Urology Postop Phone Note:    Asael underwent surgery on 3/28/25.    POSTOPERATIVE DIAGNOSIS: Right Renal Mass  PROCEDURE PERFORMED:    Right robot nephrectomy, retroperitoneal   STAFF SURGEON: Morgan Joel MD.  ANESTHESIA: General.     Postop course:   Admitted to hospital overnight.   [place] Real removed prior to discharge to home  Discharged to home on 3/29    Postop phone call Questions:  Fevers/chills: Afebrile. No chills or diaphoresis  Eating/drinking: Yes, adequate amounts. No N/V or any other issues.    Urine output: Voiding. Adequate amounts. Clear, yellow. No bleeding.   Drains: del real removed prior to discharge to home  Incisions: edges approximated, cdi. No drainage or s/s of infection. Covered. Open to air.     Bowel movement since surgery: Yes, a few. He's scared to get contipated, if he'd quit taking bowel meds.  Bowel med: senna and miralax, he wants to keep taking bowel meds for a few more days     Pain: 1 out of 10. Pain goal:   Pain med(s): Tylenol     Follow up:  Follow up appointment scheduled on 4/15 at 11:30 am with Dr. Joel at [facility] in [place]        Thank you,  SUKHDEEP Chou, RN  Care Coordinator  [facility]   [address]  My direct line: [phone]  Clinic/Schedulers: [phone]  Fax #: [phone]  Monday-Friday 8am- 4:15pm      After Hours:  If it is a night, weekend, or holiday call the [facility] number at [phone] and ask the  to page the \"urology resident on call\".    Typically, the on-call provider should return your call within 45 minutes.  Please page the on-call provider again if you haven't been contacted as expected.    Rarely, the on-call provider will be unable to promptly return a call due to a hospital emergency.  If you have paged twice and are still not contacted, ask the hospital  to page the \"urology chief-resident on call\".  For emergencies, or if you " cannot wait for a call back: call 911 or go to the Emergency Department      Wound Care:  - You may shower and get incisions wet starting 48 hrs after surgery, unless surgeon told you otherwise.   - Do not scrub incisions or submerge wounds (bath, pool, hot tub, etc.) until catheter is removed and wounds have fully healed, typically 4-6 weeks. Blot incisions dry with clean towel, do not rub.   - Remove wound dressing 48 hours after surgery if already not removed.   - Your incisions were closed with dissolvable suture that will not need to be removed.  Commonly, purple dermabond glue is applied over the top of the sutures.  Avoid using lotions or ointments on your incisions.    - Leave incisions open to air.  Cover with gauze only if needed for comfort or to protect clothing from drainage.

## 2025-04-15 ENCOUNTER — OFFICE VISIT (OUTPATIENT)
Dept: SURGERY | Facility: HOSPITAL | Age: 80
End: 2025-04-15
Attending: STUDENT IN AN ORGANIZED HEALTH CARE EDUCATION/TRAINING PROGRAM
Payer: MEDICARE

## 2025-04-15 VITALS
DIASTOLIC BLOOD PRESSURE: 56 MMHG | WEIGHT: 174.1 LBS | SYSTOLIC BLOOD PRESSURE: 109 MMHG | RESPIRATION RATE: 16 BRPM | OXYGEN SATURATION: 99 % | BODY MASS INDEX: 22.35 KG/M2 | HEART RATE: 50 BPM

## 2025-04-15 DIAGNOSIS — C64.1 MALIGNANT NEOPLASM OF KIDNEY EXCLUDING RENAL PELVIS, RIGHT (H): Primary | ICD-10-CM

## 2025-04-15 PROCEDURE — G0463 HOSPITAL OUTPT CLINIC VISIT: HCPCS | Performed by: STUDENT IN AN ORGANIZED HEALTH CARE EDUCATION/TRAINING PROGRAM

## 2025-04-15 ASSESSMENT — PAIN SCALES - GENERAL: PAINLEVEL_OUTOF10: MODERATE PAIN (4)

## 2025-04-15 NOTE — NURSING NOTE
"Urology Rooming Note    April 15, 2025 11:26 AM   Asael Crump is a 80 year old male who presents for:    Chief Complaint   Patient presents with    RECHECK     Post op     Initial Vitals: /56   Pulse 50   Resp 16   Wt 79 kg (174 lb 1.6 oz)   SpO2 99%   BMI 22.35 kg/m   Estimated body mass index is 22.35 kg/m  as calculated from the following:    Height as of 3/28/25: 1.88 m (6' 2\").    Weight as of this encounter: 79 kg (174 lb 1.6 oz). Body surface area is 2.03 meters squared.  Moderate Pain (4) Comment: Data Unavailable     Allergies reviewed: Yes  Medications reviewed: Yes    Medications: Medication refills not needed today.  Pharmacy name entered into [employer]: [id number] - [place], [place] - [address]      Rebecca Mccarthy LPN   "

## 2025-04-15 NOTE — LETTER
4/15/2025      Asael Crump  [address] [place] [address]      Dear Colleague,    Thank you for referring your patient, Asael Crump, to the [facility]. Please see a copy of my visit note below.    CHIEF COMPLAINT   It was my pleasure to see Asael Crump who is a 80 year old male for follow-up of right robot-assisted retroperitoneal radical nephrectomy.      HPI:  Asael Crump is a 80 year old male being seen for follow-up.  Duration of problem: Few months  Previous treatments: Retroperitoneal robotic radical nephrectomy     accompanied by his spouse  Reviewed previous notes he has been doing well since the surgery  Reports some discomfort in the extraction site otherwise no other pertinent issues  Voiding well      Exam:  /56   Pulse 50   Resp 16   Wt 79 kg (174 lb 1.6 oz)   SpO2 99%   BMI 22.35 kg/m    General: age-appropriate appearing male in NAD sitting in an exam chair  Resp: no respiratory distress  CV: heart rate regular  Abdomen: Degree of obesity is none. Abdomen is soft and nontender. No organomegaly. Surgical scars include port site incisions and extraction incisions healing well mild induration on the specimen extraction incision  : not performed  Neuro: grossly non focal. Normal reflexes  Motor: excellent strength throughout    Review of Imaging:  The following imaging exams were independently viewed and interpreted by me and discussed with patient:      Review of Labs:  The following labs were reviewed by me and discussed with the patient:  Surgical pathology:  KIDNEY: Nephrectomy   8th Edition - Protocol posted: 6/19/2024KIDNEY: NEPHRECTOMY - All Specimens  SPECIMEN   Procedure  Total (simple) nephrectomy   Specimen Laterality  Right   TUMOR   Tumor Focality  Unifocal   Tumor Site  Lower pole   Tumor Size  Greatest Dimension (Centimeters): 3.2 cm   Histologic Type  Clear cell renal cell carcinoma   Histologic Grade (WHO / ISUP)  G3,  nucleoli conspicuous at 100x magnification   Tumor Extent  Limited to kidney   Histologic Features  Sarcomatoid or rhabdoid features not identified   Tumor Necrosis  Not identified   Lymphatic and / or Vascular Invasion  Not identified   MARGINS   Margin Status  All margins negative for invasive carcinoma   REGIONAL LYMPH NODES   Regional Lymph Node Status  Not applicable (no regional lymph nodes submitted or found)   pTNM CLASSIFICATION (AJCC 8th Edition)   Reporting of pT, pN, and (when applicable) pM categories is based on information available to the pathologist at the time the report is issued. As per the AJCC (Chapter 1, 8th Ed.) it is the managing physician's responsibility to establish the final pathologic stage based upon all pertinent information, including but potentially not limited to this pathology report.   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  Recommend follow-up in 3 months with repeat imaging and labs  Recovery has been reasonably good  Will continue monitor/surveillance as per standard guidelines    - CT Abdomen Pelvis w Contrast; Future  - Basic metabolic panel  (Ca, Cl, CO2, Creat, Gluc, K, Na, BUN); Future      Morgan Joel MD  [facility]      ==========================    Additional Billing and Coding Information:  Review of external notes as documented above   Review of the result(s) of each unique test - surgical pathology                15 minutes spent by me on the date of the encounter doing chart review, review of test results, interpretation of tests, patient visit, documentation, and discussion with family     ==========================      Again, thank you for allowing me to participate in the care of your patient.        Sincerely,        Morgan Joel MD    Electronically signed

## 2025-04-15 NOTE — PROGRESS NOTES
CHIEF COMPLAINT   It was my pleasure to see Asael Crump who is a 80 year old male for follow-up of right robot-assisted retroperitoneal radical nephrectomy.      HPI:  Asael Crump is a 80 year old male being seen for follow-up.  Duration of problem: Few months  Previous treatments: Retroperitoneal robotic radical nephrectomy     accompanied by his spouse  Reviewed previous notes he has been doing well since the surgery  Reports some discomfort in the extraction site otherwise no other pertinent issues  Voiding well      Exam:  /56   Pulse 50   Resp 16   Wt 79 kg (174 lb 1.6 oz)   SpO2 99%   BMI 22.35 kg/m    General: age-appropriate appearing male in NAD sitting in an exam chair  Resp: no respiratory distress  CV: heart rate regular  Abdomen: Degree of obesity is none. Abdomen is soft and nontender. No organomegaly. Surgical scars include port site incisions and extraction incisions healing well mild induration on the specimen extraction incision  : not performed  Neuro: grossly non focal. Normal reflexes  Motor: excellent strength throughout    Review of Imaging:  The following imaging exams were independently viewed and interpreted by me and discussed with patient:      Review of Labs:  The following labs were reviewed by me and discussed with the patient:  Surgical pathology:  KIDNEY: Nephrectomy   8th Edition - Protocol posted: 6/19/2024KIDNEY: NEPHRECTOMY - All Specimens  SPECIMEN   Procedure  Total (simple) nephrectomy   Specimen Laterality  Right   TUMOR   Tumor Focality  Unifocal   Tumor Site  Lower pole   Tumor Size  Greatest Dimension (Centimeters): 3.2 cm   Histologic Type  Clear cell renal cell carcinoma   Histologic Grade (WHO / ISUP)  G3, nucleoli conspicuous at 100x magnification   Tumor Extent  Limited to kidney   Histologic Features  Sarcomatoid or rhabdoid features not identified   Tumor Necrosis  Not identified   Lymphatic and / or Vascular Invasion  Not identified   MARGINS    Margin Status  All margins negative for invasive carcinoma   REGIONAL LYMPH NODES   Regional Lymph Node Status  Not applicable (no regional lymph nodes submitted or found)   pTNM CLASSIFICATION (AJCC 8th Edition)   Reporting of pT, pN, and (when applicable) pM categories is based on information available to the pathologist at the time the report is issued. As per the AJCC (Chapter 1, 8th Ed.) it is the managing physician's responsibility to establish the final pathologic stage based upon all pertinent information, including but potentially not limited to this pathology report.   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  Recommend follow-up in 3 months with repeat imaging and labs  Recovery has been reasonably good  Will continue monitor/surveillance as per standard guidelines    - CT Abdomen Pelvis w Contrast; Future  - Basic metabolic panel  (Ca, Cl, CO2, Creat, Gluc, K, Na, BUN); Future      Morgan Joel MD  [facility]      ==========================    Additional Billing and Coding Information:  Review of external notes as documented above   Review of the result(s) of each unique test - surgical pathology                15 minutes spent by me on the date of the encounter doing chart review, review of test results, interpretation of tests, patient visit, documentation, and discussion with family     ==========================

## 2025-07-10 ENCOUNTER — HOSPITAL ENCOUNTER (OUTPATIENT)
Dept: CT IMAGING | Facility: CLINIC | Age: 80
End: 2025-07-10
Attending: STUDENT IN AN ORGANIZED HEALTH CARE EDUCATION/TRAINING PROGRAM
Payer: MEDICARE

## 2025-07-10 DIAGNOSIS — C64.1 MALIGNANT NEOPLASM OF KIDNEY EXCLUDING RENAL PELVIS, RIGHT (H): ICD-10-CM

## 2025-07-10 LAB
CREAT BLD-MCNC: 1.5 MG/DL (ref 0.7–1.2)
EGFRCR SERPLBLD CKD-EPI 2021: 47 ML/MIN/1.73M2

## 2025-07-10 PROCEDURE — 82565 ASSAY OF CREATININE: CPT

## 2025-07-10 PROCEDURE — 74177 CT ABD & PELVIS W/CONTRAST: CPT

## 2025-07-10 PROCEDURE — 250N000011 HC RX IP 250 OP 636: Performed by: STUDENT IN AN ORGANIZED HEALTH CARE EDUCATION/TRAINING PROGRAM

## 2025-07-10 RX ORDER — IOPAMIDOL 755 MG/ML
90 INJECTION, SOLUTION INTRAVASCULAR ONCE
Status: COMPLETED | OUTPATIENT
Start: 2025-07-10 | End: 2025-07-10

## 2025-07-10 RX ADMIN — IOPAMIDOL 90 ML: 755 INJECTION, SOLUTION INTRAVENOUS at 11:14

## 2025-07-15 ENCOUNTER — OFFICE VISIT (OUTPATIENT)
Dept: SURGERY | Facility: HOSPITAL | Age: 80
End: 2025-07-15
Attending: STUDENT IN AN ORGANIZED HEALTH CARE EDUCATION/TRAINING PROGRAM
Payer: MEDICARE

## 2025-07-15 ENCOUNTER — LAB (OUTPATIENT)
Dept: INFUSION THERAPY | Facility: HOSPITAL | Age: 80
End: 2025-07-15
Attending: STUDENT IN AN ORGANIZED HEALTH CARE EDUCATION/TRAINING PROGRAM
Payer: MEDICARE

## 2025-07-15 VITALS
SYSTOLIC BLOOD PRESSURE: 113 MMHG | DIASTOLIC BLOOD PRESSURE: 62 MMHG | OXYGEN SATURATION: 97 % | BODY MASS INDEX: 22.89 KG/M2 | HEART RATE: 52 BPM | RESPIRATION RATE: 18 BRPM | WEIGHT: 178.3 LBS

## 2025-07-15 DIAGNOSIS — C64.1 MALIGNANT NEOPLASM OF KIDNEY EXCLUDING RENAL PELVIS, RIGHT (H): ICD-10-CM

## 2025-07-15 DIAGNOSIS — R53.83 LOW ENERGY: ICD-10-CM

## 2025-07-15 DIAGNOSIS — N28.89 RIGHT RENAL MASS: Primary | ICD-10-CM

## 2025-07-15 LAB
ANION GAP SERPL CALCULATED.3IONS-SCNC: 10 MMOL/L (ref 7–15)
BUN SERPL-MCNC: 26.6 MG/DL (ref 8–23)
CALCIUM SERPL-MCNC: 9.2 MG/DL (ref 8.8–10.4)
CHLORIDE SERPL-SCNC: 106 MMOL/L (ref 98–107)
CREAT SERPL-MCNC: 1.44 MG/DL (ref 0.67–1.17)
EGFRCR SERPLBLD CKD-EPI 2021: 49 ML/MIN/1.73M2
GLUCOSE SERPL-MCNC: 102 MG/DL (ref 70–99)
HCO3 SERPL-SCNC: 22 MMOL/L (ref 22–29)
POTASSIUM SERPL-SCNC: 4.6 MMOL/L (ref 3.4–5.3)
SODIUM SERPL-SCNC: 138 MMOL/L (ref 135–145)

## 2025-07-15 PROCEDURE — 3078F DIAST BP <80 MM HG: CPT | Performed by: STUDENT IN AN ORGANIZED HEALTH CARE EDUCATION/TRAINING PROGRAM

## 2025-07-15 PROCEDURE — G0463 HOSPITAL OUTPT CLINIC VISIT: HCPCS | Performed by: STUDENT IN AN ORGANIZED HEALTH CARE EDUCATION/TRAINING PROGRAM

## 2025-07-15 PROCEDURE — 80048 BASIC METABOLIC PNL TOTAL CA: CPT

## 2025-07-15 PROCEDURE — 3074F SYST BP LT 130 MM HG: CPT | Performed by: STUDENT IN AN ORGANIZED HEALTH CARE EDUCATION/TRAINING PROGRAM

## 2025-07-15 PROCEDURE — 1126F AMNT PAIN NOTED NONE PRSNT: CPT | Performed by: STUDENT IN AN ORGANIZED HEALTH CARE EDUCATION/TRAINING PROGRAM

## 2025-07-15 PROCEDURE — 99214 OFFICE O/P EST MOD 30 MIN: CPT | Performed by: STUDENT IN AN ORGANIZED HEALTH CARE EDUCATION/TRAINING PROGRAM

## 2025-07-15 PROCEDURE — 36415 COLL VENOUS BLD VENIPUNCTURE: CPT

## 2025-07-15 ASSESSMENT — PAIN SCALES - GENERAL: PAINLEVEL_OUTOF10: NO PAIN (0)

## 2025-07-15 NOTE — LETTER
7/15/2025      Asael Crump  [address] [place] [address]      Dear Colleague,    Thank you for referring your patient, Asael Crump, to the [facility]. Please see a copy of my visit note below.    CHIEF COMPLAINT   It was my pleasure to see Asael Crump who is a 80 year old male for follow-up of right radical retroperitoneal nephrectomy.      HPI:  Asael Crump is a 80 year old male being seen for postoperative follow-up.  Duration of problem: 3 months  Previous treatments: Radical nephrectomy     accompanied by his spouse  Reviewed previous notes Asael has some issues with tiredness    He feels that he lacks energy  Not sure if it is related to his surgery but is concerned    Exam:  /62   Pulse 52   Resp 18   Wt 80.9 kg (178 lb 4.8 oz)   SpO2 97%   BMI 22.89 kg/m    General: age-appropriate appearing male in NAD sitting in an exam chair  Resp: no respiratory distress  CV: heart rate regular  Abdomen: Degree of obesity is none. Abdomen is soft and nontender. No organomegaly.   : not performed  Neuro: grossly non focal. Normal reflexes  Motor: excellent strength throughout    Review of Imaging:  The following imaging exams were independently viewed and interpreted by me and discussed with patient:  EXAM: CT ABDOMEN PELVIS W CONTRAST  LOCATION: [facility]  DATE: 7/10/2025     INDICATION: Malignant neoplasm of right kidney excluding renal pelvis (H).  COMPARISON: CT 02/24/2025 and 12/12/2024  TECHNIQUE: CT scan of the abdomen and pelvis was performed following injection of IV contrast. Multiplanar reformats were obtained. Dose reduction techniques were used.  CONTRAST: Isovue 370 90 mL.     FINDINGS:   LOWER CHEST: Normal.     HEPATOBILIARY: Normal.     PANCREAS: Normal.     SPLEEN: Normal.     ADRENAL GLANDS: Normal.     KIDNEYS/BLADDER: Surgical changes of a right nephrectomy. No evidence for mass recurrence. Benign-appearing  cortical cyst left kidney requires no additional followup. No kidney stones or hydronephrosis. The urinary bladder is grossly unremarkable.     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 Mildly increased stool volume throughout the colon without obstruction.     LYMPH NODES: No lymphadenopathy.     VASCULATURE: Stable surgical changes of prior abdominal aortic aneurysm repair. 2.2 cm left common iliac artery aneurysm 2.2 cm proximal right common iliac artery with mural thrombus.     PELVIC ORGANS: Prostate gland enlargement.     MUSCULOSKELETAL: Moderate degenerative changes of the lumbar spine facet joints. Stable faint area of sclerosis left iliac bone.                                                                      IMPRESSION:   1.  Surgical changes of a right nephrectomy. No evidence for recurrent or metastatic disease.  2.  Benign left renal cyst requires no additional followup.  3.  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  4.  Prostate gland enlargement.  5.  Stable surgical changes of prior abdominal aortic aneurysm repair. Bilateral common iliac artery aneurysms are unchanged.    Review of Labs:  The following labs were reviewed by me and discussed with the patient:  Component      Latest Ref Rng 7/15/2025  10:57 AM   Sodium      135 - 145 mmol/L 138    Anion Gap      7 - 15 mmol/L 10    Creatinine      0.67 - 1.17 mg/dL 1.44 (H)    GFR Estimate      >60 mL/min/1.73m2 49 (L)    Calcium      8.8 - 10.4 mg/dL 9.2    Potassium      3.4 - 5.3 mmol/L 4.6    Urea Nitrogen      8.0 - 23.0 mg/dL 26.6 (H)    Chloride      98 - 107 mmol/L 106    Carbon Dioxide (CO2)      22 - 29 mmol/L 22    Glucose      70 - 99 mg/dL 102 (H)       Legend:  (H) High  (L) Low    Assessment & Plan    Right renal mass  Currently appears to be stable without any concerns for recurrence  Renal function looks stable as well with creatinine at 1.44 which is same as his postoperative baseline  I  did recommend that he should touch base with his primary care regarding his other issues  Otherwise I would want to see him in 9 months with repeat imaging without contrast and labs  - CT Abdomen Pelvis w/o Contrast; Future  - CBC with platelets; Future  - Basic metabolic panel  (Ca, Cl, CO2, Creat, Gluc, K, Na, BUN); Future    Low energy  We discussed about possible causes for his low energy levels including low testosterone levels  At this time however with other issues including memory and gait problems I did inform him that he should talk with his primary care doctor to have that looked at  He agrees      Morgan Joel MD  [facility]      ==========================    Additional Billing and Coding Information:  Review of external notes as documented above   Review of the result(s) of each unique test - BMP, CT                25 minutes spent by me on the date of the encounter doing chart review, review of test results, interpretation of tests, patient visit, documentation, and discussion with family     ==========================    Again, thank you for allowing me to participate in the care of your patient.        Sincerely,        Morgan Joel MD    Electronically signed

## 2025-07-15 NOTE — NURSING NOTE
"Urology Rooming Note    July 15, 2025 11:07 AM   Asael Crump is a 80 year old male who presents for:    Chief Complaint   Patient presents with    RECHECK     Initial Vitals: /62   Pulse 52   Resp 18   Wt 80.9 kg (178 lb 4.8 oz)   SpO2 97%   BMI 22.89 kg/m   Estimated body mass index is 22.89 kg/m  as calculated from the following:    Height as of 3/28/25: 1.88 m (6' 2\").    Weight as of this encounter: 80.9 kg (178 lb 4.8 oz). Body surface area is 2.06 meters squared.  No Pain (0) Comment: Data Unavailable     Allergies reviewed: Yes  Medications reviewed: Yes    Medications: Medication refills not needed today.  Pharmacy name entered into [employer]: [id number] - [place] - [address]      Rebecca Mccarthy LPN   "

## 2025-07-15 NOTE — PROGRESS NOTES
CHIEF COMPLAINT   It was my pleasure to see Asael Crump who is a 80 year old male for follow-up of right radical retroperitoneal nephrectomy.      HPI:  Asael Crump is a 80 year old male being seen for postoperative follow-up.  Duration of problem: 3 months  Previous treatments: Radical nephrectomy     accompanied by his spouse  Reviewed previous notes Asael has some issues with tiredness    He feels that he lacks energy  Not sure if it is related to his surgery but is concerned    Exam:  /62   Pulse 52   Resp 18   Wt 80.9 kg (178 lb 4.8 oz)   SpO2 97%   BMI 22.89 kg/m    General: age-appropriate appearing male in NAD sitting in an exam chair  Resp: no respiratory distress  CV: heart rate regular  Abdomen: Degree of obesity is none. Abdomen is soft and nontender. No organomegaly.   : not performed  Neuro: grossly non focal. Normal reflexes  Motor: excellent strength throughout    Review of Imaging:  The following imaging exams were independently viewed and interpreted by me and discussed with patient:  EXAM: CT ABDOMEN PELVIS W CONTRAST  LOCATION: [facility]  DATE: 7/10/2025     INDICATION: Malignant neoplasm of right kidney excluding renal pelvis (H).  COMPARISON: CT 02/24/2025 and 12/12/2024  TECHNIQUE: CT scan of the abdomen and pelvis was performed following injection of IV contrast. Multiplanar reformats were obtained. Dose reduction techniques were used.  CONTRAST: Isovue 370 90 mL.     FINDINGS:   LOWER CHEST: Normal.     HEPATOBILIARY: Normal.     PANCREAS: Normal.     SPLEEN: Normal.     ADRENAL GLANDS: Normal.     KIDNEYS/BLADDER: Surgical changes of a right nephrectomy. No evidence for mass recurrence. Benign-appearing cortical cyst left kidney requires no additional followup. No kidney stones or hydronephrosis. The urinary bladder is grossly unremarkable.     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 Mildly increased stool volume  throughout the colon without obstruction.     LYMPH NODES: No lymphadenopathy.     VASCULATURE: Stable surgical changes of prior abdominal aortic aneurysm repair. 2.2 cm left common iliac artery aneurysm 2.2 cm proximal right common iliac artery with mural thrombus.     PELVIC ORGANS: Prostate gland enlargement.     MUSCULOSKELETAL: Moderate degenerative changes of the lumbar spine facet joints. Stable faint area of sclerosis left iliac bone.                                                                      IMPRESSION:   1.  Surgical changes of a right nephrectomy. No evidence for recurrent or metastatic disease.  2.  Benign left renal cyst requires no additional followup.  3.  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  4.  Prostate gland enlargement.  5.  Stable surgical changes of prior abdominal aortic aneurysm repair. Bilateral common iliac artery aneurysms are unchanged.    Review of Labs:  The following labs were reviewed by me and discussed with the patient:  Component      Latest Ref Rng 7/15/2025  10:57 AM   Sodium      135 - 145 mmol/L 138    Anion Gap      7 - 15 mmol/L 10    Creatinine      0.67 - 1.17 mg/dL 1.44 (H)    GFR Estimate      >60 mL/min/1.73m2 49 (L)    Calcium      8.8 - 10.4 mg/dL 9.2    Potassium      3.4 - 5.3 mmol/L 4.6    Urea Nitrogen      8.0 - 23.0 mg/dL 26.6 (H)    Chloride      98 - 107 mmol/L 106    Carbon Dioxide (CO2)      22 - 29 mmol/L 22    Glucose      70 - 99 mg/dL 102 (H)       Legend:  (H) High  (L) Low    Assessment & Plan     Right renal mass  Currently appears to be stable without any concerns for recurrence  Renal function looks stable as well with creatinine at 1.44 which is same as his postoperative baseline  I did recommend that he should touch base with his primary care regarding his other issues  Otherwise I would want to see him in 9 months with repeat imaging without contrast and labs  - CT Abdomen Pelvis w/o Contrast; Future  - CBC with  platelets; Future  - Basic metabolic panel  (Ca, Cl, CO2, Creat, Gluc, K, Na, BUN); Future    Low energy  We discussed about possible causes for his low energy levels including low testosterone levels  At this time however with other issues including memory and gait problems I did inform him that he should talk with his primary care doctor to have that looked at  He agrees      Morgan Joel MD  [facility]      ==========================    Additional Billing and Coding Information:  Review of external notes as documented above   Review of the result(s) of each unique test - BMP, CT                25 minutes spent by me on the date of the encounter doing chart review, review of test results, interpretation of tests, patient visit, documentation, and discussion with family     ==========================

## 2025-07-30 ENCOUNTER — OFFICE VISIT (OUTPATIENT)
Dept: INTERNAL MEDICINE | Facility: CLINIC | Age: 80
End: 2025-07-30
Payer: MEDICARE

## 2025-07-30 VITALS
BODY MASS INDEX: 22.43 KG/M2 | HEART RATE: 57 BPM | SYSTOLIC BLOOD PRESSURE: 100 MMHG | RESPIRATION RATE: 16 BRPM | HEIGHT: 74 IN | DIASTOLIC BLOOD PRESSURE: 62 MMHG | TEMPERATURE: 97.8 F | OXYGEN SATURATION: 97 % | WEIGHT: 174.8 LBS

## 2025-07-30 DIAGNOSIS — N18.31 CKD STAGE 3A, GFR 45-59 ML/MIN (H): ICD-10-CM

## 2025-07-30 DIAGNOSIS — I71.40 ABDOMINAL AORTIC ANEURYSM (AAA) GREATER THAN 5.5 CM IN DIAMETER IN MALE: ICD-10-CM

## 2025-07-30 DIAGNOSIS — C64.1 RENAL CELL CARCINOMA, RIGHT (H): ICD-10-CM

## 2025-07-30 DIAGNOSIS — R68.89 HYPOKINESIS: ICD-10-CM

## 2025-07-30 DIAGNOSIS — R26.89 SHUFFLING GAIT: ICD-10-CM

## 2025-07-30 DIAGNOSIS — R26.89 POOR BALANCE: Primary | ICD-10-CM

## 2025-07-30 DIAGNOSIS — I25.10 ATHEROSCLEROSIS OF NATIVE CORONARY ARTERY OF NATIVE HEART WITHOUT ANGINA PECTORIS: ICD-10-CM

## 2025-07-30 PROBLEM — N28.89 RIGHT RENAL MASS: Status: RESOLVED | Noted: 2025-01-29 | Resolved: 2025-07-30

## 2025-07-30 LAB
ANION GAP SERPL CALCULATED.3IONS-SCNC: 8 MMOL/L (ref 7–15)
BUN SERPL-MCNC: 30.7 MG/DL (ref 8–23)
CALCIUM SERPL-MCNC: 9.3 MG/DL (ref 8.8–10.4)
CHLORIDE SERPL-SCNC: 105 MMOL/L (ref 98–107)
CREAT SERPL-MCNC: 1.64 MG/DL (ref 0.67–1.17)
EGFRCR SERPLBLD CKD-EPI 2021: 42 ML/MIN/1.73M2
ERYTHROCYTE [DISTWIDTH] IN BLOOD BY AUTOMATED COUNT: 13.3 % (ref 10–15)
GLUCOSE SERPL-MCNC: 95 MG/DL (ref 70–99)
HCO3 SERPL-SCNC: 25 MMOL/L (ref 22–29)
HCT VFR BLD AUTO: 38.5 % (ref 40–53)
HGB BLD-MCNC: 12.9 G/DL (ref 13.3–17.7)
MCH RBC QN AUTO: 28.7 PG (ref 26.5–33)
MCHC RBC AUTO-ENTMCNC: 33.5 G/DL (ref 31.5–36.5)
MCV RBC AUTO: 86 FL (ref 78–100)
PLATELET # BLD AUTO: 170 10E3/UL (ref 150–450)
POTASSIUM SERPL-SCNC: 5.2 MMOL/L (ref 3.4–5.3)
RBC # BLD AUTO: 4.49 10E6/UL (ref 4.4–5.9)
SODIUM SERPL-SCNC: 138 MMOL/L (ref 135–145)
WBC # BLD AUTO: 7.6 10E3/UL (ref 4–11)

## 2025-07-30 PROCEDURE — 80048 BASIC METABOLIC PNL TOTAL CA: CPT | Performed by: INTERNAL MEDICINE

## 2025-07-30 PROCEDURE — 3074F SYST BP LT 130 MM HG: CPT | Performed by: INTERNAL MEDICINE

## 2025-07-30 PROCEDURE — 36415 COLL VENOUS BLD VENIPUNCTURE: CPT | Performed by: INTERNAL MEDICINE

## 2025-07-30 PROCEDURE — G2211 COMPLEX E/M VISIT ADD ON: HCPCS | Performed by: INTERNAL MEDICINE

## 2025-07-30 PROCEDURE — 1126F AMNT PAIN NOTED NONE PRSNT: CPT | Performed by: INTERNAL MEDICINE

## 2025-07-30 PROCEDURE — 85027 COMPLETE CBC AUTOMATED: CPT | Performed by: INTERNAL MEDICINE

## 2025-07-30 PROCEDURE — 99214 OFFICE O/P EST MOD 30 MIN: CPT | Performed by: INTERNAL MEDICINE

## 2025-07-30 PROCEDURE — 3078F DIAST BP <80 MM HG: CPT | Performed by: INTERNAL MEDICINE

## 2025-07-30 RX ORDER — METOPROLOL SUCCINATE 25 MG/1
TABLET, EXTENDED RELEASE ORAL
Qty: 23 TABLET | Refills: 0 | Status: SHIPPED | OUTPATIENT
Start: 2025-07-30 | End: 2025-08-29

## 2025-07-30 ASSESSMENT — PAIN SCALES - GENERAL: PAINLEVEL_OUTOF10: NO PAIN (0)

## 2025-07-30 NOTE — PROGRESS NOTES
Office Visit - Follow Up   Asael Crump   80 year old male    Date of Visit: 7/30/2025    Chief Complaint   Patient presents with    Fatigue     Has been fatigue and weak since surgery        Assessment and Plan   1. Poor balance (Primary)  Patient has poor balance, shuffling gait and some possible hypokinesis.  He has no tremor and no cogwheel rigidity.  He may have some early parkinsonism but it is not all that clear on my examination today.  We will have him meet with one of our movement disorder specialist and neurology.  In the meantime, I think he could benefit from strengthening his gluteal muscles his quads and calf muscles and have recommended physical therapy and given him some exercises to try at home.  Additionally, I think that after his nephrectomy his atenolol dose is too much and may be contributing to some of his symptoms as well.  I would like him to stop atenolol and switch to metoprolol.  After 2 weeks on 25 mg I would like him to cut this tablet in half for 2 weeks and then stop it.  In 6 weeks I would like him to follow-up with me in clinic.  We can evaluate his symptoms, his blood pressure and long-term it may be beneficial for him to consider a low-dose ACE inhibitor both for beneficial prevention on aneurysm growth as well as renal protection.  - Physical Therapy  Referral; Future    2. Shuffling gait  - Physical Therapy  Referral; Future  - Adult Neurology  Referral; Future    3. Hypokinesis  - Adult Neurology  Referral; Future    4. CAD, MI 2002 with stent  - metoprolol succinate ER (TOPROL XL) 25 MG 24 hr tablet; Take 1 tablet (25 mg) by mouth daily for 15 days, THEN 0.5 tablets (12.5 mg) daily for 15 days.  Dispense: 23 tablet; Refill: 0  - CBC with platelets; Future  - CBC with platelets    5. AAA, s/p open repair 12/24, Dr. Watters  Reviewed Dr. Watters's recent consultation, all good and stable    6. Renal cell carcinoma, right (H)  Reviewed  "pathology and nephrology notes, negative margins, surveillance per routine    7. CKD stage 3a, GFR 45-59 ml/min (H)  Expected increase in creatinine now stable, consider ACE inhibitor long-term    Return in about 6 weeks (around 9/10/2025) for Follow up.     History of Present Illness   This 80 year old man comes in for follow-up.  He has had 2 major surgeries this year including an open AAA repair and then nephrectomy.  He has lost quite a bit of weight.  He has been feeling more tired and weak.  He has had difficulty walking, feeling like his gait is shuffling and his wife is noted that he does not swing his arms.  His energy level has been low.  He did some physical therapy after his first surgery which seemed to be helpful but he has not been doing much since.       Physical Exam   General Appearance:   No acute distress    /62 (BP Location: Left arm, Patient Position: Sitting, Cuff Size: Adult Regular)   Pulse 57   Temp 97.8  F (36.6  C)   Resp 16   Ht 1.88 m (6' 2\")   Wt 79.3 kg (174 lb 12.8 oz)   SpO2 97%   BMI 22.44 kg/m      Heart rate is bradycardic rhythm regular lungs clear to auscultation bilaterally.  He has no cogwheel rigidity of his upper extremities or lower extremities.  He does have a bit of a shuffling gait and some mild hypokinesis overall he is makes good eye contact he blinks and he has good facial expression.  I do not observe a tremor.  At times during the visit 1 might be able to describe him as having a masked facies.  He has poor muscle tone in his gluteal muscles thighs and calves.     Additional Information   Current Outpatient Medications   Medication Sig Dispense Refill    acetaminophen (TYLENOL) 325 MG tablet Take 2 tablets (650 mg) by mouth every 4 hours as needed for mild pain. 50 tablet 0    acetaminophen (TYLENOL) 500 MG tablet Take 1,000 mg by mouth every 6 hours as needed ([id number]).      aspirin 81 MG EC tablet [ASPIRIN 81 MG EC TABLET] Take 81 mg by mouth " daily.      cholecalciferol, vitamin D3, (VITAMIN D3) 2,000 unit cap [CHOLECALCIFEROL, VITAMIN D3, (VITAMIN D3) 2,000 UNIT CAP] Take by mouth daily.      cyanocobalamin (VITAMIN B-12) 1000 MCG tablet Take 1,000 mcg by mouth daily      metoprolol succinate ER (TOPROL XL) 25 MG 24 hr tablet Take 1 tablet (25 mg) by mouth daily for 15 days, THEN 0.5 tablets (12.5 mg) daily for 15 days. 23 tablet 0    polyethylene glycol (MIRALAX) 17 g packet Take 1 packet by mouth daily.      rosuvastatin (CRESTOR) 10 MG tablet Take 1 tablet (10 mg) by mouth daily. 90 tablet 4    senna-docusate (SENOKOT-S/PERICOLACE) 8.6-50 MG tablet Take 1-2 tablets by mouth 2 times daily. 30 tablet 0    tamsulosin (FLOMAX) 0.4 MG capsule Take 1 capsule (0.4 mg) by mouth every evening. 90 capsule 4       Time:     The longitudinal plan of care for the diagnosis(es)/condition(s) as documented were addressed during this visit. Due to the added complexity in care, I will continue to support Asael in the subsequent management and with ongoing continuity of care.     Calixto Mancilla MD  Answers submitted by the patient for this visit:  General Questionnaire (Submitted on 7/30/2025)  Chief Complaint: Chronic problems general questions HPI Form  What is the reason for your visit today? : feeling very weak following 2 operations  How many servings of fruits and vegetables do you eat daily?: 2-3  On average, how many sweetened beverages do you drink each day (Examples: soda, juice, sweet tea, etc.  Do NOT count diet or artificially sweetened beverages)?: 0  How many minutes a day do you exercise enough to make your heart beat faster?: 9 or less  How many days a week do you exercise enough to make your heart beat faster?: 3 or less  How many days per week do you miss taking your medication?: 0  Questionnaire about: Chronic problems general questions HPI Form (Submitted on 7/30/2025)  Chief Complaint: Chronic problems general questions HPI Form

## 2025-07-31 ENCOUNTER — PATIENT OUTREACH (OUTPATIENT)
Dept: CARE COORDINATION | Facility: CLINIC | Age: 80
End: 2025-07-31
Payer: MEDICARE

## 2025-08-04 ENCOUNTER — PATIENT OUTREACH (OUTPATIENT)
Dept: CARE COORDINATION | Facility: CLINIC | Age: 80
End: 2025-08-04
Payer: MEDICARE

## 2025-08-22 DIAGNOSIS — I25.10 ATHEROSCLEROSIS OF NATIVE CORONARY ARTERY OF NATIVE HEART WITHOUT ANGINA PECTORIS: ICD-10-CM

## 2025-08-25 RX ORDER — METOPROLOL SUCCINATE 25 MG/1
TABLET, EXTENDED RELEASE ORAL
Qty: 23 TABLET | Refills: 0 | Status: SHIPPED | OUTPATIENT
Start: 2025-08-25 | End: 2025-09-24

## 2025-08-27 ENCOUNTER — THERAPY VISIT (OUTPATIENT)
Dept: PHYSICAL THERAPY | Facility: REHABILITATION | Age: 80
End: 2025-08-27
Attending: INTERNAL MEDICINE
Payer: MEDICARE

## 2025-08-27 DIAGNOSIS — R26.89 POOR BALANCE: Primary | ICD-10-CM

## 2025-08-27 DIAGNOSIS — R26.89 SHUFFLING GAIT: ICD-10-CM

## 2025-08-27 PROCEDURE — 97116 GAIT TRAINING THERAPY: CPT | Mod: GP

## 2025-08-27 PROCEDURE — 97161 PT EVAL LOW COMPLEX 20 MIN: CPT | Mod: GP

## 2025-09-03 ENCOUNTER — THERAPY VISIT (OUTPATIENT)
Dept: PHYSICAL THERAPY | Facility: REHABILITATION | Age: 80
End: 2025-09-03
Attending: INTERNAL MEDICINE
Payer: MEDICARE

## 2025-09-03 DIAGNOSIS — R26.89 SHUFFLING GAIT: ICD-10-CM

## 2025-09-03 DIAGNOSIS — R26.89 POOR BALANCE: Primary | ICD-10-CM

## 2025-09-03 PROCEDURE — 97116 GAIT TRAINING THERAPY: CPT | Mod: GP | Performed by: PHYSICAL THERAPIST

## 2025-09-03 PROCEDURE — 97110 THERAPEUTIC EXERCISES: CPT | Mod: GP | Performed by: PHYSICAL THERAPIST

## 2025-09-03 PROCEDURE — 97112 NEUROMUSCULAR REEDUCATION: CPT | Mod: GP | Performed by: PHYSICAL THERAPIST

## (undated) DEVICE — STPL POWERED ECHELON VASC 35MM PVE35A

## (undated) DEVICE — PACK DAVINCI UROLOGY SBA15UDFSG

## (undated) DEVICE — DAVINCI XI HANDPIECE ESU VESSEL SEALER 8MM EXT 480422

## (undated) DEVICE — DAVINCI HOT SHEARS TIP COVER  400180

## (undated) DEVICE — SU PDS II 1 CT MONOFIL Z353H

## (undated) DEVICE — ENDO DISSECTOR BLUNT 10MM CHERRY BCD10

## (undated) DEVICE — DAVINCI XI SEAL UNIVERSAL 5-12MM 470500

## (undated) DEVICE — ENDO TROCAR DISSECTING BALLOON OMS-PDBS2

## (undated) DEVICE — SU MONOCRYL 4-0 PS-2 18" UND Y496G

## (undated) DEVICE — SUCTION IRR STRYKERFLOW II W/TIP 250-070-520

## (undated) DEVICE — ENDO POUCH UNIVERSAL RETRIEVAL SYSTEM INZII 12/15MM CD004

## (undated) DEVICE — CATH TRAY FOLEY COUDE SURESTEP 16FR W/DRN BAG LATEX A304416A

## (undated) DEVICE — CLIP ENDO HEMO-LOC PURPLE LG 544240

## (undated) DEVICE — DAVINCI XI DRAPE ARM 470015

## (undated) DEVICE — SU DERMABOND ADVANCED .7ML DNX12

## (undated) DEVICE — ENDO DISSECTOR BLUNT 05MM  BTD05

## (undated) DEVICE — ENDO TROCAR CONMED AIRSEAL BLADELESS 12X120MM IAS12-120LP

## (undated) DEVICE — GLOVE BIOGEL PI MICRO INDICATOR UNDERGLOVE SZ 7.0 48970

## (undated) DEVICE — Device

## (undated) DEVICE — TUBING CONMED AIRSEAL SMOKE EVAC INSUFFLATION ASM-EVAC

## (undated) DEVICE — GLOVE BIOGEL PI ULTRATOUCH SZ 7.0 41170

## (undated) DEVICE — GLOVE BIOGEL PI SZ 6.5 40865

## (undated) DEVICE — SU PDS II 0 CT-2 27" Z334H

## (undated) DEVICE — SOL NACL 0.9% IRRIG 1000ML BOTTLE 2F7124

## (undated) DEVICE — ANTIFOG SOLUTION SEE SHARP 150M TROCAR SWABS 30978 (COI)

## (undated) DEVICE — SU VICRYL+ 0 27 UR6 VLT VCP603H

## (undated) DEVICE — TAPE DURAPORE 3" SILK 1538-3

## (undated) DEVICE — DAVINCI XI DRAPE COLUMN 470341

## (undated) DEVICE — RULER SURGICAL PLASTIC STRL LF CS628

## (undated) DEVICE — CLEANER INST PRE-KLENZ SOAK SHIELD TUBE 6 ML MEDIUM 2D66J4

## (undated) DEVICE — SOL NACL 0.9% INJ 1000ML BAG 2B1324X

## (undated) DEVICE — NDL INSUFFLATION 13GA 120MM C2201

## (undated) DEVICE — LINEN TOWEL PACK X5 5464

## (undated) DEVICE — SU VICRYL 2-0 CT-2 27" UND J269H

## (undated) DEVICE — SOL WATER IRRIG 1000ML BOTTLE 2F7114

## (undated) DEVICE — SU VICRYL 3-0 SH 27" J316H

## (undated) DEVICE — SURGICEL ENDOSCOPIC APPLICATOR FOR ORC POWDER 3123SPEA

## (undated) DEVICE — SURGICEL POWDER ABSORBABLE HEMOSTAT 3GM 3013SP

## (undated) RX ORDER — CEFAZOLIN SODIUM/WATER 2 G/20 ML
SYRINGE (ML) INTRAVENOUS
Status: DISPENSED
Start: 2025-03-28

## (undated) RX ORDER — PROPOFOL 10 MG/ML
INJECTION, EMULSION INTRAVENOUS
Status: DISPENSED
Start: 2025-03-28

## (undated) RX ORDER — HYDROMORPHONE HCL IN WATER/PF 6 MG/30 ML
PATIENT CONTROLLED ANALGESIA SYRINGE INTRAVENOUS
Status: DISPENSED
Start: 2025-03-28

## (undated) RX ORDER — BUPIVACAINE HYDROCHLORIDE 2.5 MG/ML
INJECTION, SOLUTION EPIDURAL; INFILTRATION; INTRACAUDAL; PERINEURAL
Status: DISPENSED
Start: 2025-03-28

## (undated) RX ORDER — ONDANSETRON 2 MG/ML
INJECTION INTRAMUSCULAR; INTRAVENOUS
Status: DISPENSED
Start: 2025-03-28

## (undated) RX ORDER — HYDROMORPHONE HYDROCHLORIDE 1 MG/ML
INJECTION, SOLUTION INTRAMUSCULAR; INTRAVENOUS; SUBCUTANEOUS
Status: DISPENSED
Start: 2025-03-28

## (undated) RX ORDER — HYDRALAZINE HYDROCHLORIDE 20 MG/ML
INJECTION INTRAMUSCULAR; INTRAVENOUS
Status: DISPENSED
Start: 2025-03-28

## (undated) RX ORDER — FENTANYL CITRATE 0.05 MG/ML
INJECTION, SOLUTION INTRAMUSCULAR; INTRAVENOUS
Status: DISPENSED
Start: 2025-03-28

## (undated) RX ORDER — ACETAMINOPHEN 325 MG/1
TABLET ORAL
Status: DISPENSED
Start: 2025-03-28

## (undated) RX ORDER — FENTANYL CITRATE 50 UG/ML
INJECTION, SOLUTION INTRAMUSCULAR; INTRAVENOUS
Status: DISPENSED
Start: 2025-03-28

## (undated) RX ORDER — CEFAZOLIN SODIUM/WATER 3 G/30 ML
SYRINGE (ML) INTRAVENOUS
Status: DISPENSED
Start: 2025-03-28

## (undated) RX ORDER — HEPARIN SODIUM 5000 [USP'U]/.5ML
INJECTION, SOLUTION INTRAVENOUS; SUBCUTANEOUS
Status: DISPENSED
Start: 2025-03-28

## (undated) RX ORDER — DEXAMETHASONE SODIUM PHOSPHATE 4 MG/ML
INJECTION, SOLUTION INTRA-ARTICULAR; INTRALESIONAL; INTRAMUSCULAR; INTRAVENOUS; SOFT TISSUE
Status: DISPENSED
Start: 2025-03-28